# Patient Record
Sex: FEMALE | Race: WHITE | NOT HISPANIC OR LATINO | Employment: OTHER | URBAN - NONMETROPOLITAN AREA
[De-identification: names, ages, dates, MRNs, and addresses within clinical notes are randomized per-mention and may not be internally consistent; named-entity substitution may affect disease eponyms.]

---

## 2017-02-13 ENCOUNTER — OFFICE VISIT (OUTPATIENT)
Dept: CARDIOLOGY | Facility: CLINIC | Age: 77
End: 2017-02-13

## 2017-02-13 VITALS
HEART RATE: 101 BPM | HEIGHT: 64 IN | BODY MASS INDEX: 28 KG/M2 | SYSTOLIC BLOOD PRESSURE: 152 MMHG | WEIGHT: 164 LBS | DIASTOLIC BLOOD PRESSURE: 70 MMHG | OXYGEN SATURATION: 98 %

## 2017-02-13 DIAGNOSIS — R07.9 CHEST PAIN, UNSPECIFIED TYPE: Primary | ICD-10-CM

## 2017-02-13 DIAGNOSIS — I10 ESSENTIAL HYPERTENSION: ICD-10-CM

## 2017-02-13 DIAGNOSIS — K21.9 GASTROESOPHAGEAL REFLUX DISEASE, ESOPHAGITIS PRESENCE NOT SPECIFIED: ICD-10-CM

## 2017-02-13 PROCEDURE — 99203 OFFICE O/P NEW LOW 30 MIN: CPT | Performed by: INTERNAL MEDICINE

## 2017-02-13 RX ORDER — MULTIPLE VITAMINS W/ MINERALS TAB 9MG-400MCG
1 TAB ORAL DAILY
COMMUNITY

## 2017-02-13 RX ORDER — OMEPRAZOLE 20 MG/1
20 CAPSULE, DELAYED RELEASE ORAL DAILY
COMMUNITY
End: 2017-05-16

## 2017-02-13 RX ORDER — CALCIUM POLYCARBOPHIL 625 MG 625 MG/1
625 TABLET ORAL DAILY
COMMUNITY

## 2017-02-13 NOTE — PROGRESS NOTES
Chief complaint : Chest pain    History of Present Illness very pleasant 76-year-old female who comes for cardiac evaluation.  She was seen by her primary care physician and patient did complain of chest discomfort.  She described her symptoms as sharp and burning sensation more like indigestion.  There was no associated symptoms of shortness of breath diaphoresis or dizziness.  She believes is more like indigestion.  However she does have a family history of premature coronary artery disease.  Her brother had myocardial infarction and bypass surgery in his 40s.  Her mother did have heart disease as well.    Subjective      Review of Systems   Constitution: Negative. Negative for decreased appetite, diaphoresis, weakness, night sweats, weight gain and weight loss.   HENT: Negative for headaches, hearing loss, nosebleeds and sore throat.    Eyes: Negative.  Negative for blurred vision and photophobia.   Cardiovascular: Negative for chest pain, claudication, dyspnea on exertion, irregular heartbeat, leg swelling, palpitations, paroxysmal nocturnal dyspnea and syncope.   Respiratory: Negative for cough, hemoptysis, shortness of breath and wheezing.    Endocrine: Negative for cold intolerance, heat intolerance, polydipsia, polyphagia and polyuria.   Hematologic/Lymphatic: Negative.    Skin: Negative for color change, dry skin, flushing, itching and rash.   Musculoskeletal: Negative.  Negative for muscle cramps, muscle weakness and myalgias.   Gastrointestinal: Negative for abdominal pain, change in bowel habit, diarrhea, hematemesis, melena, nausea and vomiting.   Genitourinary: Negative for dysuria, frequency and hematuria.   Neurological: Negative for dizziness, focal weakness, light-headedness, loss of balance, numbness and seizures.   Psychiatric/Behavioral: Negative.  Negative for substance abuse, suicidal ideas and thoughts of violence.   Allergic/Immunologic: Negative.        Past Medical History   Diagnosis Date    • Allergic rhinitis    • Artificial lens present      Artificial lens in position - left      • Benign hypertension    • Corns and callus    • Dry cough      most likely due to ACE inhibitor     • Essential hypertension    • GERD (gastroesophageal reflux disease)    • Glaucoma, both eyes    • Hammer toe    • History of Papanicolaou smear of cervix 01/19/2004     Atrophy present.   • Malabsorption of glucose    • Nondisplaced fracture of right radial styloid process, initial encounter for closed fracture    • Nuclear senile cataract of right eye    • Nuclear senile cataract of right eye    • Obesity    • Pain in right hand    • Primary open angle glaucoma      controlled left right IOP increased but OCT stable         Family History   Problem Relation Age of Onset   • Cancer Other    • Diabetes Other    • Heart disease Other        Sulfa antibiotics     reports that she has never smoked. She does not have any smokeless tobacco history on file. She reports that she does not drink alcohol.    Objective     Vital Signs  Heart Rate:  [101] 101  BP: (152)/(70) 152/70    Physical Exam   Constitutional: She is oriented to person, place, and time. She appears well-developed and well-nourished.   HENT:   Head: Normocephalic and atraumatic.   Eyes: Conjunctivae and EOM are normal. Pupils are equal, round, and reactive to light.   Neck: Neck supple. No JVD present. Carotid bruit is not present. No tracheal deviation and no edema present.   Cardiovascular: Normal rate, regular rhythm, S1 normal, S2 normal, normal heart sounds and intact distal pulses.  Exam reveals no gallop, no S3, no S4 and no friction rub.    No murmur heard.  Pulmonary/Chest: Effort normal and breath sounds normal. She has no wheezes. She has no rales. She exhibits no tenderness.   Abdominal: Bowel sounds are normal. She exhibits no abdominal bruit and no pulsatile midline mass. There is no rebound and no guarding.   Musculoskeletal: Normal range of  motion. She exhibits no edema.   Neurological: She is alert and oriented to person, place, and time.   Skin: Skin is warm and dry.   Psychiatric: She has a normal mood and affect.       Procedures    Assessment/Plan     Patient Active Problem List   Diagnosis   • Chest pain   • Essential hypertension   • Gastroesophageal reflux disease     1. Chest pain, unspecified type  We will plan an exercise treadmill stress test for risk stratification.  Continue with management of hypertension and guideline direct medical therapy.    2. Essential hypertension  Her blood pressure appears to be uncontrolled at this time.  Recent modification to her medications were done by her primary care physician.  We will evaluate her pressure during the exercise treadmill stress testing.  I have asked her to check her blood pressure on a regular basis and right a log and bring to the office during her next visit.    3. Gastroesophageal reflux disease, esophagitis presence not specified    Her gastroesophageal reflux symptoms have been well controlled with current dose of Prilosec.  Plan is to continue with current medication.    I discussed the patients findings and my recommendations with patient.    Rodo Mills MD  02/13/17  1:09 PM    EMR Dragon/Transcription disclaimer:   Much of this encounter note is an electronic transcription/translation of spoken language to printed text. The electronic translation of spoken language may permit erroneous, or at times, nonsensical words or phrases to be inadvertently transcribed; Although I have reviewed the note for such errors, some may still exist.

## 2017-02-16 DIAGNOSIS — R42 DIZZINESS AND GIDDINESS: Primary | ICD-10-CM

## 2017-02-16 DIAGNOSIS — I73.9 CLAUDICATION OF LOWER EXTREMITY (HCC): ICD-10-CM

## 2017-05-16 ENCOUNTER — OFFICE VISIT (OUTPATIENT)
Dept: INTERNAL MEDICINE | Facility: CLINIC | Age: 77
End: 2017-05-16

## 2017-05-16 VITALS
DIASTOLIC BLOOD PRESSURE: 60 MMHG | SYSTOLIC BLOOD PRESSURE: 110 MMHG | BODY MASS INDEX: 27.98 KG/M2 | WEIGHT: 163.9 LBS | HEIGHT: 64 IN

## 2017-05-16 DIAGNOSIS — I10 ESSENTIAL HYPERTENSION: Primary | ICD-10-CM

## 2017-05-16 DIAGNOSIS — K21.9 GASTROESOPHAGEAL REFLUX DISEASE WITHOUT ESOPHAGITIS: ICD-10-CM

## 2017-05-16 PROCEDURE — 99213 OFFICE O/P EST LOW 20 MIN: CPT | Performed by: INTERNAL MEDICINE

## 2017-05-16 RX ORDER — LOSARTAN POTASSIUM AND HYDROCHLOROTHIAZIDE 12.5; 5 MG/1; MG/1
1 TABLET ORAL DAILY
Qty: 30 TABLET | Refills: 5 | Status: SHIPPED | OUTPATIENT
Start: 2017-05-16 | End: 2017-11-13 | Stop reason: SDUPTHER

## 2017-05-16 RX ORDER — OMEPRAZOLE 20 MG/1
20 CAPSULE, DELAYED RELEASE ORAL DAILY
Qty: 30 CAPSULE | Refills: 5 | Status: SHIPPED | OUTPATIENT
Start: 2017-05-16 | End: 2017-11-13 | Stop reason: SDUPTHER

## 2017-05-22 ENCOUNTER — OFFICE VISIT (OUTPATIENT)
Dept: CARDIOLOGY | Facility: CLINIC | Age: 77
End: 2017-05-22

## 2017-05-22 VITALS
OXYGEN SATURATION: 98 % | SYSTOLIC BLOOD PRESSURE: 144 MMHG | HEART RATE: 97 BPM | HEIGHT: 64 IN | DIASTOLIC BLOOD PRESSURE: 64 MMHG | BODY MASS INDEX: 28.17 KG/M2 | WEIGHT: 165 LBS

## 2017-05-22 DIAGNOSIS — I10 ESSENTIAL HYPERTENSION: Primary | ICD-10-CM

## 2017-05-22 PROCEDURE — 99212 OFFICE O/P EST SF 10 MIN: CPT | Performed by: INTERNAL MEDICINE

## 2017-10-17 ENCOUNTER — FLU SHOT (OUTPATIENT)
Dept: FAMILY MEDICINE CLINIC | Facility: CLINIC | Age: 77
End: 2017-10-17

## 2017-10-17 PROCEDURE — G0008 ADMIN INFLUENZA VIRUS VAC: HCPCS | Performed by: FAMILY MEDICINE

## 2017-10-17 PROCEDURE — 90662 IIV NO PRSV INCREASED AG IM: CPT | Performed by: FAMILY MEDICINE

## 2017-11-13 ENCOUNTER — OFFICE VISIT (OUTPATIENT)
Dept: INTERNAL MEDICINE | Facility: CLINIC | Age: 77
End: 2017-11-13

## 2017-11-13 VITALS
HEART RATE: 101 BPM | HEIGHT: 64 IN | BODY MASS INDEX: 28.61 KG/M2 | SYSTOLIC BLOOD PRESSURE: 118 MMHG | WEIGHT: 167.6 LBS | DIASTOLIC BLOOD PRESSURE: 80 MMHG | OXYGEN SATURATION: 98 %

## 2017-11-13 DIAGNOSIS — I10 ESSENTIAL HYPERTENSION: Primary | ICD-10-CM

## 2017-11-13 DIAGNOSIS — R73.03 PREDIABETES: ICD-10-CM

## 2017-11-13 DIAGNOSIS — K21.9 GASTROESOPHAGEAL REFLUX DISEASE, ESOPHAGITIS PRESENCE NOT SPECIFIED: ICD-10-CM

## 2017-11-13 PROCEDURE — 99213 OFFICE O/P EST LOW 20 MIN: CPT | Performed by: INTERNAL MEDICINE

## 2017-11-13 RX ORDER — OMEPRAZOLE 20 MG/1
20 CAPSULE, DELAYED RELEASE ORAL DAILY
Qty: 30 CAPSULE | Refills: 5 | Status: SHIPPED | OUTPATIENT
Start: 2017-11-13 | End: 2018-05-15 | Stop reason: SDUPTHER

## 2017-11-13 RX ORDER — LOSARTAN POTASSIUM AND HYDROCHLOROTHIAZIDE 12.5; 5 MG/1; MG/1
1 TABLET ORAL DAILY
Qty: 30 TABLET | Refills: 5 | Status: SHIPPED | OUTPATIENT
Start: 2017-11-13 | End: 2018-05-15 | Stop reason: SDUPTHER

## 2017-11-13 NOTE — PROGRESS NOTES
Subjective   Lyndsay Brumfield is a 77 y.o. female       Patient is in for recheck on HTN, prediabetes and GERD.    BP is controlled. She denies any chest pain or chest discomfort. She also denies dyspnea or orthopnea.  No headaches, dizziness or lightheadedness.  Takes medication as prescribed and tolerates it well.  Stress test in March 2017 was negative for ischemia.    Prediabetes.  Patient is watching carbs in her diet.  FBS has been between 100 and 120.  HgbA1c 6.1 /06/2016/.  Denies polydipsia or polyuria.Denies any other endocrine related complaints.      GERD is controlled on Prilosec. Denies abdominal pain, nausea or vomiting. Denies constipation or diarrhea.  Bowels are moving without blood.  She tried antiacids instead but had worsening of symptoms.    Past Medical History:   Diagnosis Date   • Allergic rhinitis    • Artificial lens present     Artificial lens in position - left      • Benign hypertension    • Corns and callus    • Dry cough     most likely due to ACE inhibitor     • Essential hypertension    • GERD (gastroesophageal reflux disease)    • Glaucoma, both eyes    • Hammer toe    • History of Papanicolaou smear of cervix 01/19/2004    Atrophy present.   • Malabsorption of glucose    • Nondisplaced fracture of right radial styloid process, initial encounter for closed fracture    • Nuclear senile cataract of right eye    • Nuclear senile cataract of right eye    • Obesity    • Pain in right hand    • Primary open angle glaucoma     controlled left right IOP increased but OCT stable       Past Surgical History:   Procedure Laterality Date   • BREAST SURGERY  11/22/1974    Cystic disease, right breast. Excision of lesion of right breast.   • ENDOSCOPY AND COLONOSCOPY  02/11/2008    A few diverticula in the sigmoid.   • ESOPHAGOSCOPY / EGD  03/10/2008    Grade II esophagitis of the distal esophagus. Multiple biopsies were obtained from the distal esophagus. The stomach appeared normal. The  duodenum appeared normal   • EXTERNAL EAR SURGERY  04/25/2001    Full thickness wedge excision of the left ear, with layered closure   • EYE SURGERY  02/19/2013    Remove cataract, insert lens (1)    left eye    • MASTECTOMY  01/10/1991    Total mastectomy with level I and II node dissection 2   • OTHER SURGICAL HISTORY  03/20/2015    OCT DISC NFL 30963 (2) (Primary open angle glaucoma)        Social History   Substance Use Topics   • Smoking status: Never Smoker   • Smokeless tobacco: Not on file   • Alcohol use No     Family History   Problem Relation Age of Onset   • Cancer Other    • Diabetes Other    • Heart disease Other      Allergies   Allergen Reactions   • Sulfa Antibiotics      Current Outpatient Prescriptions on File Prior to Visit   Medication Sig Dispense Refill   • Calcium-Magnesium-Vitamin D (CALCIUM 500 PO) Take  by mouth.     • latanoprost (XALATAN) 0.005 % ophthalmic solution 1 drop Every Night.     • Multiple Vitamins-Minerals (MULTIVITAMIN WITH MINERALS) tablet tablet Take 1 tablet by mouth Daily.     • phenazopyridine (PYRIDIUM) 200 MG tablet Take 1 tablet by mouth 3 (Three) Times a Day As Needed for bladder spasms. 6 tablet 0   • polycarbophil (FIBERCON) 625 MG tablet Take 625 mg by mouth Daily.     • [DISCONTINUED] losartan-hydrochlorothiazide (HYZAAR) 50-12.5 MG per tablet Take 1 tablet by mouth Daily. 30 tablet 5   • [DISCONTINUED] omeprazole (PRILOSEC) 20 MG capsule Take 1 capsule by mouth Daily. 30 capsule 5     No current facility-administered medications on file prior to visit.      Review of Systems   Constitutional: Negative for activity change and fatigue.   Respiratory: Negative for chest tightness and shortness of breath.    Cardiovascular: Negative for chest pain, palpitations and leg swelling.   Endocrine: Negative for cold intolerance, heat intolerance, polydipsia and polyuria.   Genitourinary: Negative for flank pain and frequency.   Neurological: Negative for dizziness,  light-headedness and headaches.       Objective   Physical Exam   Constitutional: She is oriented to person, place, and time. She appears well-developed and well-nourished.   HENT:   Head: Normocephalic and atraumatic.   Nose: Nose normal.   Mouth/Throat: Oropharynx is clear and moist.   Eyes: Conjunctivae and EOM are normal. No scleral icterus.   Neck: Neck supple. No thyromegaly present.   Cardiovascular: Normal rate and regular rhythm.    No murmur heard.  Pulmonary/Chest: Effort normal and breath sounds normal.   Abdominal: Soft. Bowel sounds are normal. She exhibits no mass.   Neurological: She is alert and oriented to person, place, and time. She has normal reflexes.   Skin: Skin is warm and dry. No rash noted.   Psychiatric: She has a normal mood and affect. Her behavior is normal.           Patient Active Problem List   Diagnosis   • Chest pain   • Essential hypertension   • Gastroesophageal reflux disease         Admission on 03/19/2017, Discharged on 03/19/2017   • Iron 11/29/2016 123  37 - 170 ug/dl Final   • TIBC 11/29/2016 296  265 - 497 ug/dl Final   • Iron Saturation 11/29/2016 41.6  15.0 - 50.0 % Final   • Folate 11/29/2016 >20.00  ng/ml Final   • Vitamin B-12 11/29/2016 686  239 - 931 pg/ml Final   Office Visit on 11/17/2016   Component Date Value Ref Range Status   • Sodium 11/22/2016 136* 137 - 145 mmol/L Final   • Potassium 11/22/2016 4.2  3.5 - 5.1 mmol/L Final   • Chloride 11/22/2016 97  95 - 110 mmol/L Final   • CO2 11/22/2016 30  22 - 31 mmol/L Final   • Anion Gap 11/22/2016 9.0  5.0 - 15.0 mmol/L Final   • Glucose 11/22/2016 105* 60 - 100 mg/dl Final   • BUN 11/22/2016 20  7 - 21 mg/dl Final   • Creatinine 11/22/2016 0.9  0.5 - 1.0 mg/dl Final   • GFR MDRD Non  11/22/2016 61  39 - 90 mL/min/1.73 sq.M Final   • GFR MDRD  11/22/2016 73  39 - 90 mL/min/1.73 sq.M Final   • Calcium 11/22/2016 9.5  8.4 - 10.2 mg/dl Final   • Total Protein 11/22/2016 7.5  6.3 - 8.6  gm/dl Final   • Albumin 11/22/2016 3.9  3.4 - 4.8 gm/dl Final   • Total Bilirubin 11/22/2016 0.5  0.2 - 1.3 mg/dl Final   • Alkaline Phosphatase 11/22/2016 46  38 - 126 U/L Final   • AST (SGOT) 11/22/2016 33  14 - 36 U/L Final   • ALT (SGPT) 11/22/2016 29  9 - 52 U/L Final   • WBC 11/22/2016 5.4  3.2 - 9.8 x1000/uL Final   • RBC 11/22/2016 3.76* 3.77 - 5.16 miguel/mm3 Final   • Hemoglobin 11/22/2016 11.9* 12.0 - 15.5 gm/dl Final   • Hematocrit 11/22/2016 35.1  35.0 - 45.0 % Final   • MCV 11/22/2016 93.4  80.0 - 98.0 fl Final   • MCH 11/22/2016 31.6  26.0 - 34.0 pg Final   • MCHC 11/22/2016 33.9  31.4 - 36.0 gm/dl Final   • RDW 11/22/2016 12.4  11.5 - 14.5 % Final   • Platelets 11/22/2016 282  150 - 450 x1000/mm3 Final   • MPV 11/22/2016 10.1  8.0 - 12.0 fl Final   • Neutrophil Rel % 11/22/2016 47.8  37.0 - 80.0 % Final   • Lymphocyte Rel % 11/22/2016 36.5  10.0 - 50.0 % Final   • Monocyte Rel % 11/22/2016 11.1  0.0 - 12.0 % Final   • Eosinophil Rel % 11/22/2016 3.1  0.0 - 7.0 % Final   • Basophil Rel % 11/22/2016 1.3  0.0 - 2.0 % Final   • Immature Granulocyte Rel % 11/22/2016 0.20  0.00 - 0.50 % Final   • Neutrophils Absolute 11/22/2016 2.58  2.00 - 8.60 x1000/uL Final   • Lymphocytes Absolute 11/22/2016 1.97  0.60 - 4.20 x1000/uL Final   • Monocytes Absolute 11/22/2016 0.60  0.00 - 0.90 x1000/uL Final   • Eosinophils Absolute 11/22/2016 0.17  0.00 - 0.70 x1000/uL Final   • Basophils Absolute 11/22/2016 0.07  0.00 - 0.20 x1000/uL Final   • Immature Granulocytes Absolute 11/22/2016 0.010  0.005 - 0.022 x1000/uL Final   Abstract on 11/15/2016   Component Date Value Ref Range Status   • HM Colonoscopy 02/11/2008 COMPLETE   Final   • HM Dexa Scan 06/17/2016 COMPLETE (2)   Final         Assessment       Diagnosis Plan   1. Essential hypertension  Comprehensive Metabolic Panel    Lipid Panel   2. Prediabetes  Comprehensive Metabolic Panel    Hemoglobin A1c   3. Gastroesophageal reflux disease, esophagitis presence not  specified           Plan      1. Patient will continue Hyzaar.      DASH.      Counseled on physical activity and exercise.  2. Will check HgbA1c.       ADA diet.       Discussed calories restriction and weight loss.  3.  Prilosec is continued.       Medication benefits and side effects reviewed.       Advised to avoid fried, fatty foods and gastric irritants.      Follow up in 6 months.            This document has been electronically signed by Graciela Osborn MD on November 13, 2017 3:04 PM

## 2017-11-14 ENCOUNTER — LAB (OUTPATIENT)
Dept: LAB | Facility: HOSPITAL | Age: 77
End: 2017-11-14

## 2017-11-14 ENCOUNTER — TELEPHONE (OUTPATIENT)
Dept: INTERNAL MEDICINE | Facility: CLINIC | Age: 77
End: 2017-11-14

## 2017-11-14 DIAGNOSIS — D53.9 ANEMIA, DEFICIENCY: ICD-10-CM

## 2017-11-14 LAB
ALBUMIN SERPL-MCNC: 4.4 G/DL (ref 3.4–4.8)
ALBUMIN/GLOB SERPL: 1.2 G/DL (ref 1.1–1.8)
ALP SERPL-CCNC: 54 U/L (ref 38–126)
ALT SERPL W P-5'-P-CCNC: 26 U/L (ref 9–52)
ANION GAP SERPL CALCULATED.3IONS-SCNC: 10 MMOL/L (ref 5–15)
ARTICHOKE IGE QN: 103 MG/DL (ref 1–129)
AST SERPL-CCNC: 31 U/L (ref 14–36)
BILIRUB SERPL-MCNC: 0.6 MG/DL (ref 0.2–1.3)
BUN BLD-MCNC: 24 MG/DL (ref 7–21)
BUN/CREAT SERPL: 23.1 (ref 7–25)
CALCIUM SPEC-SCNC: 10 MG/DL (ref 8.4–10.2)
CHLORIDE SERPL-SCNC: 99 MMOL/L (ref 95–110)
CHOLEST SERPL-MCNC: 210 MG/DL (ref 0–199)
CO2 SERPL-SCNC: 29 MMOL/L (ref 22–31)
CREAT BLD-MCNC: 1.04 MG/DL (ref 0.5–1)
FOLATE SERPL-MCNC: >20 NG/ML (ref 2.76–21)
GFR SERPL CREATININE-BSD FRML MDRD: 51 ML/MIN/1.73 (ref 60–90)
GLOBULIN UR ELPH-MCNC: 3.7 GM/DL (ref 2.3–3.5)
GLUCOSE BLD-MCNC: 122 MG/DL (ref 60–100)
HBA1C MFR BLD: 6.3 % (ref 4–5.6)
HDLC SERPL-MCNC: 60 MG/DL (ref 60–200)
LDLC/HDLC SERPL: 2.07 {RATIO} (ref 0–3.22)
POTASSIUM BLD-SCNC: 4.3 MMOL/L (ref 3.5–5.1)
PROT SERPL-MCNC: 8.1 G/DL (ref 6.3–8.6)
SODIUM BLD-SCNC: 138 MMOL/L (ref 137–145)
TRIGL SERPL-MCNC: 129 MG/DL (ref 20–199)
VIT B12 BLD-MCNC: 744 PG/ML (ref 239–931)

## 2017-11-14 PROCEDURE — 82746 ASSAY OF FOLIC ACID SERUM: CPT | Performed by: INTERNAL MEDICINE

## 2017-11-14 PROCEDURE — 80061 LIPID PANEL: CPT | Performed by: INTERNAL MEDICINE

## 2017-11-14 PROCEDURE — 36415 COLL VENOUS BLD VENIPUNCTURE: CPT | Performed by: INTERNAL MEDICINE

## 2017-11-14 PROCEDURE — 83036 HEMOGLOBIN GLYCOSYLATED A1C: CPT | Performed by: INTERNAL MEDICINE

## 2017-11-14 PROCEDURE — 82607 VITAMIN B-12: CPT | Performed by: INTERNAL MEDICINE

## 2017-11-14 PROCEDURE — 80053 COMPREHEN METABOLIC PANEL: CPT | Performed by: INTERNAL MEDICINE

## 2017-11-14 NOTE — PATIENT INSTRUCTIONS

## 2017-11-14 NOTE — TELEPHONE ENCOUNTER
Spoke with pt let her know hba1c was in diabetic range DR DAVIDSON said no meds at this time but she does recommend appt with diabetic educator  To help with diet and they will contact her with an appt....

## 2017-11-15 DIAGNOSIS — R73.03 PREDIABETES: Primary | ICD-10-CM

## 2018-01-25 ENCOUNTER — OFFICE VISIT (OUTPATIENT)
Dept: INTERNAL MEDICINE | Facility: CLINIC | Age: 78
End: 2018-01-25

## 2018-01-25 ENCOUNTER — APPOINTMENT (OUTPATIENT)
Dept: LAB | Facility: HOSPITAL | Age: 78
End: 2018-01-25

## 2018-01-25 VITALS
HEART RATE: 107 BPM | WEIGHT: 159.19 LBS | DIASTOLIC BLOOD PRESSURE: 74 MMHG | HEIGHT: 64 IN | OXYGEN SATURATION: 99 % | BODY MASS INDEX: 27.18 KG/M2 | SYSTOLIC BLOOD PRESSURE: 136 MMHG

## 2018-01-25 DIAGNOSIS — R10.10 UPPER ABDOMINAL PAIN: Primary | ICD-10-CM

## 2018-01-25 DIAGNOSIS — R11.2 NON-INTRACTABLE VOMITING WITH NAUSEA, UNSPECIFIED VOMITING TYPE: ICD-10-CM

## 2018-01-25 LAB
ALBUMIN SERPL-MCNC: 4.1 G/DL (ref 3.4–4.8)
ALBUMIN/GLOB SERPL: 1.2 G/DL (ref 1.1–1.8)
ALP SERPL-CCNC: 53 U/L (ref 38–126)
ALT SERPL W P-5'-P-CCNC: 29 U/L (ref 9–52)
AMYLASE SERPL-CCNC: 97 U/L (ref 50–130)
ANION GAP SERPL CALCULATED.3IONS-SCNC: 12 MMOL/L (ref 5–15)
AST SERPL-CCNC: 41 U/L (ref 14–36)
BASOPHILS # BLD AUTO: 0.04 10*3/MM3 (ref 0–0.2)
BASOPHILS NFR BLD AUTO: 0.6 % (ref 0–2)
BILIRUB SERPL-MCNC: 0.4 MG/DL (ref 0.2–1.3)
BUN BLD-MCNC: 22 MG/DL (ref 7–21)
BUN/CREAT SERPL: 19.8 (ref 7–25)
CALCIUM SPEC-SCNC: 9.1 MG/DL (ref 8.4–10.2)
CHLORIDE SERPL-SCNC: 98 MMOL/L (ref 95–110)
CO2 SERPL-SCNC: 24 MMOL/L (ref 22–31)
CREAT BLD-MCNC: 1.11 MG/DL (ref 0.5–1)
DEPRECATED RDW RBC AUTO: 41 FL (ref 36.4–46.3)
EOSINOPHIL # BLD AUTO: 0.08 10*3/MM3 (ref 0–0.7)
EOSINOPHIL NFR BLD AUTO: 1.2 % (ref 0–7)
ERYTHROCYTE [DISTWIDTH] IN BLOOD BY AUTOMATED COUNT: 12.3 % (ref 11.5–14.5)
GFR SERPL CREATININE-BSD FRML MDRD: 47 ML/MIN/1.73 (ref 39–90)
GLOBULIN UR ELPH-MCNC: 3.4 GM/DL (ref 2.3–3.5)
GLUCOSE BLD-MCNC: 102 MG/DL (ref 60–100)
HCT VFR BLD AUTO: 36.7 % (ref 35–45)
HGB BLD-MCNC: 12.8 G/DL (ref 12–15.5)
IMM GRANULOCYTES # BLD: 0.01 10*3/MM3 (ref 0–0.02)
IMM GRANULOCYTES NFR BLD: 0.1 % (ref 0–0.5)
LIPASE SERPL-CCNC: 118 U/L (ref 23–300)
LYMPHOCYTES # BLD AUTO: 2.1 10*3/MM3 (ref 0.6–4.2)
LYMPHOCYTES NFR BLD AUTO: 30.8 % (ref 10–50)
MCH RBC QN AUTO: 31.8 PG (ref 26.5–34)
MCHC RBC AUTO-ENTMCNC: 34.9 G/DL (ref 31.4–36)
MCV RBC AUTO: 91.1 FL (ref 80–98)
MONOCYTES # BLD AUTO: 0.8 10*3/MM3 (ref 0–0.9)
MONOCYTES NFR BLD AUTO: 11.7 % (ref 0–12)
NEUTROPHILS # BLD AUTO: 3.79 10*3/MM3 (ref 2–8.6)
NEUTROPHILS NFR BLD AUTO: 55.6 % (ref 37–80)
PLATELET # BLD AUTO: 250 10*3/MM3 (ref 150–450)
PMV BLD AUTO: 10.3 FL (ref 8–12)
POTASSIUM BLD-SCNC: 3.6 MMOL/L (ref 3.5–5.1)
PROT SERPL-MCNC: 7.5 G/DL (ref 6.3–8.6)
RBC # BLD AUTO: 4.03 10*6/MM3 (ref 3.77–5.16)
SODIUM BLD-SCNC: 134 MMOL/L (ref 137–145)
WBC NRBC COR # BLD: 6.82 10*3/MM3 (ref 3.2–9.8)

## 2018-01-25 PROCEDURE — 36415 COLL VENOUS BLD VENIPUNCTURE: CPT | Performed by: INTERNAL MEDICINE

## 2018-01-25 PROCEDURE — 82150 ASSAY OF AMYLASE: CPT | Performed by: INTERNAL MEDICINE

## 2018-01-25 PROCEDURE — 80053 COMPREHEN METABOLIC PANEL: CPT | Performed by: INTERNAL MEDICINE

## 2018-01-25 PROCEDURE — 85025 COMPLETE CBC W/AUTO DIFF WBC: CPT | Performed by: INTERNAL MEDICINE

## 2018-01-25 PROCEDURE — 83690 ASSAY OF LIPASE: CPT | Performed by: INTERNAL MEDICINE

## 2018-01-25 PROCEDURE — 99213 OFFICE O/P EST LOW 20 MIN: CPT | Performed by: INTERNAL MEDICINE

## 2018-01-25 RX ORDER — ONDANSETRON 4 MG/1
4 TABLET, FILM COATED ORAL EVERY 8 HOURS PRN
Qty: 20 TABLET | Refills: 0 | Status: SHIPPED | OUTPATIENT
Start: 2018-01-25 | End: 2018-02-22

## 2018-01-25 RX ORDER — SUCRALFATE 1 G/1
1 TABLET ORAL 4 TIMES DAILY
Qty: 90 TABLET | Refills: 1 | Status: SHIPPED | OUTPATIENT
Start: 2018-01-25 | End: 2018-08-05

## 2018-01-25 NOTE — PROGRESS NOTES
Subjective   Lyndsay Brumfield is a 77 y.o. female       Patient is in complaining of  abdominal pain, nausea and vomiting since this weekend. Pain is located in epigastric area and does not radiate. It is worse after eating ad has been associated with nausea. Patient vomited few times on Saturday but none since then.  Takes Prilosec for GERD but it has not been helping with her symptoms.      Abdominal Pain   This is a new problem. The current episode started in the past 7 days. The problem occurs intermittently. The pain is located in the epigastric region. The pain is at a severity of 3/10. The abdominal pain does not radiate. Associated symptoms include nausea and vomiting. Pertinent negatives include no belching, dysuria, fever, frequency, headaches, hematochezia or melena. The pain is aggravated by eating. Her past medical history is significant for GERD. There is no history of gallstones, irritable bowel syndrome, pancreatitis or PUD.       Past Medical History:   Diagnosis Date   • Allergic rhinitis    • Artificial lens present     Artificial lens in position - left      • Benign hypertension    • Corns and callus    • Dry cough     most likely due to ACE inhibitor     • Essential hypertension    • GERD (gastroesophageal reflux disease)    • Glaucoma, both eyes    • Hammer toe    • History of Papanicolaou smear of cervix 01/19/2004    Atrophy present.   • Malabsorption of glucose    • Nondisplaced fracture of right radial styloid process, initial encounter for closed fracture    • Nuclear senile cataract of right eye    • Nuclear senile cataract of right eye    • Obesity    • Pain in right hand    • Primary open angle glaucoma     controlled left right IOP increased but OCT stable       Past Surgical History:   Procedure Laterality Date   • BREAST SURGERY  11/22/1974    Cystic disease, right breast. Excision of lesion of right breast.   • ENDOSCOPY AND COLONOSCOPY  02/11/2008    A few diverticula in the  sigmoid.   • ESOPHAGOSCOPY / EGD  03/10/2008    Grade II esophagitis of the distal esophagus. Multiple biopsies were obtained from the distal esophagus. The stomach appeared normal. The duodenum appeared normal   • EXTERNAL EAR SURGERY  04/25/2001    Full thickness wedge excision of the left ear, with layered closure   • EYE SURGERY  02/19/2013    Remove cataract, insert lens (1)    left eye    • MASTECTOMY  01/10/1991    Total mastectomy with level I and II node dissection 2   • OTHER SURGICAL HISTORY  03/20/2015    OCT DISC NFL 98145 (2) (Primary open angle glaucoma)        Social History   Substance Use Topics   • Smoking status: Never Smoker   • Smokeless tobacco: Never Used   • Alcohol use No     Family History   Problem Relation Age of Onset   • Cancer Other    • Diabetes Other    • Heart disease Other      Allergies   Allergen Reactions   • Sulfa Antibiotics      Current Outpatient Prescriptions on File Prior to Visit   Medication Sig Dispense Refill   • Calcium-Magnesium-Vitamin D (CALCIUM 500 PO) Take  by mouth.     • latanoprost (XALATAN) 0.005 % ophthalmic solution 1 drop Every Night.     • losartan-hydrochlorothiazide (HYZAAR) 50-12.5 MG per tablet Take 1 tablet by mouth Daily. 30 tablet 5   • Multiple Vitamins-Minerals (MULTIVITAMIN WITH MINERALS) tablet tablet Take 1 tablet by mouth Daily.     • omeprazole (PRILOSEC) 20 MG capsule Take 1 capsule by mouth Daily. 30 capsule 5   • polycarbophil (FIBERCON) 625 MG tablet Take 625 mg by mouth Daily.       No current facility-administered medications on file prior to visit.      Review of Systems   Constitutional: Negative for activity change, chills and fever.   Eyes: Negative for photophobia and visual disturbance.   Respiratory: Negative for cough, chest tightness, shortness of breath and wheezing.    Cardiovascular: Negative for chest pain, palpitations and leg swelling.   Gastrointestinal: Positive for abdominal pain, nausea and vomiting. Negative for  hematochezia and melena.   Genitourinary: Negative for dysuria, flank pain and frequency.   Musculoskeletal: Negative for back pain and neck pain.   Skin: Negative for color change, rash and wound.   Neurological: Negative for dizziness, light-headedness and headaches.       Objective   Physical Exam   Constitutional: She is oriented to person, place, and time. She appears well-developed and well-nourished.   HENT:   Head: Normocephalic and atraumatic.   Nose: Nose normal.   Mouth/Throat: Oropharynx is clear and moist.   Eyes: Conjunctivae are normal.   Neck: Neck supple. No JVD present. No thyromegaly present.   Cardiovascular: Normal rate and regular rhythm.    Pulmonary/Chest: Effort normal and breath sounds normal.   Abdominal: Soft. Bowel sounds are normal. She exhibits no distension and no mass. There is tenderness. No hernia.   Mild tenderness in epigastric area   Neurological: She is alert and oriented to person, place, and time. She has normal reflexes.   Skin: Skin is warm and dry. No rash noted.   Psychiatric: She has a normal mood and affect. Her behavior is normal.   Nursing note and vitals reviewed.          Patient Active Problem List   Diagnosis   • Chest pain   • Essential hypertension   • Gastroesophageal reflux disease   • Primary open angle glaucoma   • Pain in right hand   • Obesity   • Nuclear senile cataract of right eye   • Nuclear senile cataract of right eye   • Nondisplaced fracture of right radial styloid process, initial encounter for closed fracture   • Malabsorption of glucose   • History of Papanicolaou smear of cervix   • Hammer toe   • Glaucoma, both eyes   • GERD (gastroesophageal reflux disease)   • Dry cough   • Corns and callus   • Benign hypertension   • Artificial lens present   • Allergic rhinitis               Assessment/Plan   Lyndsay was seen today for nausea, vomiting and abdominal pain.    Diagnoses and all orders for this visit:    Upper abdominal pain  -     CBC &  Differential  -     Comprehensive Metabolic Panel  -     Amylase  -     Lipase  -     Urinalysis With / Microscopic If Indicated - Urine, Clean Catch    Non-intractable vomiting with nausea, unspecified vomiting type    Other orders  -     sucralfate (CARAFATE) 1 g tablet; Take 1 tablet by mouth 4 (Four) Times a Day.  -     ondansetron (ZOFRAN) 4 MG tablet; Take 1 tablet by mouth Every 8 (Eight) Hours As Needed for Nausea or Vomiting.         Plan        Suspect symptoms are related to gastritis.  Trial of Carafate 1 gr PO tid.  Zofran 4 mg q 8hrs PRN nausea and vomiting.  [place] diet.        Follow up in 4 weeks.          This document has been electronically signed by Graciela Osborn MD on January 25, 2018 10:25 AM

## 2018-02-22 ENCOUNTER — OFFICE VISIT (OUTPATIENT)
Dept: INTERNAL MEDICINE | Facility: CLINIC | Age: 78
End: 2018-02-22

## 2018-02-22 VITALS
HEIGHT: 64 IN | BODY MASS INDEX: 27.08 KG/M2 | SYSTOLIC BLOOD PRESSURE: 140 MMHG | WEIGHT: 158.6 LBS | DIASTOLIC BLOOD PRESSURE: 60 MMHG

## 2018-02-22 DIAGNOSIS — K29.00 ACUTE GASTRITIS WITHOUT HEMORRHAGE, UNSPECIFIED GASTRITIS TYPE: Primary | ICD-10-CM

## 2018-02-22 DIAGNOSIS — K21.9 GASTROESOPHAGEAL REFLUX DISEASE WITHOUT ESOPHAGITIS: ICD-10-CM

## 2018-02-22 PROCEDURE — 99213 OFFICE O/P EST LOW 20 MIN: CPT | Performed by: INTERNAL MEDICINE

## 2018-02-22 NOTE — PROGRESS NOTES
Subjective   Lyndsay Brumfield is a 77 y.o. female       Patient is in for recheck on abdominal pain.  She is doing better on Carafate with improvement in her symptoms.  Abdominal pain improved and she has not had any vomiting.  She gets nauseated at times if she eats certain foods.  She is still taking Prilosec and it has been helping with acid reflux.      Past Medical History:   Diagnosis Date   • Allergic rhinitis    • Artificial lens present     Artificial lens in position - left      • Benign hypertension    • Corns and callus    • Dry cough     most likely due to ACE inhibitor     • Essential hypertension    • GERD (gastroesophageal reflux disease)    • Glaucoma, both eyes    • Hammer toe    • History of Papanicolaou smear of cervix 01/19/2004    Atrophy present.   • Malabsorption of glucose    • Nondisplaced fracture of right radial styloid process, initial encounter for closed fracture    • Nuclear senile cataract of right eye    • Nuclear senile cataract of right eye    • Obesity    • Pain in right hand    • Primary open angle glaucoma     controlled left right IOP increased but OCT stable       Past Surgical History:   Procedure Laterality Date   • BREAST SURGERY  11/22/1974    Cystic disease, right breast. Excision of lesion of right breast.   • ENDOSCOPY AND COLONOSCOPY  02/11/2008    A few diverticula in the sigmoid.   • ESOPHAGOSCOPY / EGD  03/10/2008    Grade II esophagitis of the distal esophagus. Multiple biopsies were obtained from the distal esophagus. The stomach appeared normal. The duodenum appeared normal   • EXTERNAL EAR SURGERY  04/25/2001    Full thickness wedge excision of the left ear, with layered closure   • EYE SURGERY  02/19/2013    Remove cataract, insert lens (1)    left eye    • MASTECTOMY  01/10/1991    Total mastectomy with level I and II node dissection 2   • OTHER SURGICAL HISTORY  03/20/2015    OCT DISC NFL 10295 (2) (Primary open angle glaucoma)        Social History    Substance Use Topics   • Smoking status: Never Smoker   • Smokeless tobacco: Never Used   • Alcohol use No     Family History   Problem Relation Age of Onset   • Cancer Other    • Diabetes Other    • Heart disease Other      Allergies   Allergen Reactions   • Sulfa Antibiotics      Current Outpatient Prescriptions on File Prior to Visit   Medication Sig Dispense Refill   • Calcium-Magnesium-Vitamin D (CALCIUM 500 PO) Take  by mouth.     • latanoprost (XALATAN) 0.005 % ophthalmic solution 1 drop Every Night.     • losartan-hydrochlorothiazide (HYZAAR) 50-12.5 MG per tablet Take 1 tablet by mouth Daily. 30 tablet 5   • Multiple Vitamins-Minerals (MULTIVITAMIN WITH MINERALS) tablet tablet Take 1 tablet by mouth Daily.     • omeprazole (PRILOSEC) 20 MG capsule Take 1 capsule by mouth Daily. 30 capsule 5   • polycarbophil (FIBERCON) 625 MG tablet Take 625 mg by mouth Daily.     • sucralfate (CARAFATE) 1 g tablet Take 1 tablet by mouth 4 (Four) Times a Day. 90 tablet 1   • [DISCONTINUED] ondansetron (ZOFRAN) 4 MG tablet Take 1 tablet by mouth Every 8 (Eight) Hours As Needed for Nausea or Vomiting. 20 tablet 0     No current facility-administered medications on file prior to visit.      Review of Systems   Constitutional: Negative for activity change, chills and fever.   HENT: Negative.    Eyes: Negative.    Respiratory: Negative for chest tightness and shortness of breath.    Cardiovascular: Negative for chest pain, palpitations and leg swelling.   Gastrointestinal: Positive for nausea. Negative for abdominal distention, constipation, diarrhea and vomiting.   Endocrine: Negative for cold intolerance, heat intolerance, polydipsia and polyuria.   Genitourinary: Negative for flank pain and frequency.   Neurological: Negative for dizziness, light-headedness and headaches.       Objective   Physical Exam   Constitutional: She appears well-developed and well-nourished.   HENT:   Nose: Nose normal.   Mouth/Throat: Oropharynx  is clear and moist.   Eyes: Conjunctivae are normal. No scleral icterus.   Neck: Neck supple. No JVD present. No thyromegaly present.   Cardiovascular: Normal rate and regular rhythm.    No murmur heard.  Pulmonary/Chest: Effort normal and breath sounds normal.   Abdominal: Soft. Bowel sounds are normal. She exhibits no distension and no mass. There is no tenderness.   Neurological: She is alert. She has normal reflexes.   Skin: Skin is warm and dry. No rash noted.   Psychiatric: She has a normal mood and affect. Her behavior is normal.   Nursing note and vitals reviewed.          Patient Active Problem List   Diagnosis   • Chest pain   • Essential hypertension   • Gastroesophageal reflux disease   • Primary open angle glaucoma   • Pain in right hand   • Obesity   • Nuclear senile cataract of right eye   • Nuclear senile cataract of right eye   • Nondisplaced fracture of right radial styloid process, initial encounter for closed fracture   • Malabsorption of glucose   • History of Papanicolaou smear of cervix   • Hammer toe   • Glaucoma, both eyes   • GERD (gastroesophageal reflux disease)   • Dry cough   • Benign hypertension   • Artificial lens present   • Allergic rhinitis         Office Visit on 01/25/2018   Component Date Value Ref Range Status   • Glucose 01/25/2018 102* 60 - 100 mg/dL Final   • BUN 01/25/2018 22* 7 - 21 mg/dL Final   • Creatinine 01/25/2018 1.11* 0.50 - 1.00 mg/dL Final   • Sodium 01/25/2018 134* 137 - 145 mmol/L Final   • Potassium 01/25/2018 3.6  3.5 - 5.1 mmol/L Final   • Chloride 01/25/2018 98  95 - 110 mmol/L Final   • CO2 01/25/2018 24.0  22.0 - 31.0 mmol/L Final   • Calcium 01/25/2018 9.1  8.4 - 10.2 mg/dL Final   • Total Protein 01/25/2018 7.5  6.3 - 8.6 g/dL Final   • Albumin 01/25/2018 4.10  3.40 - 4.80 g/dL Final   • ALT (SGPT) 01/25/2018 29  9 - 52 U/L Final   • AST (SGOT) 01/25/2018 41* 14 - 36 U/L Final   • Alkaline Phosphatase 01/25/2018 53  38 - 126 U/L Final   • Total  Bilirubin 01/25/2018 0.4  0.2 - 1.3 mg/dL Final   • eGFR Non  Amer 01/25/2018 47  39 - 90 mL/min/1.73 Final   • Globulin 01/25/2018 3.4  2.3 - 3.5 gm/dL Final   • A/G Ratio 01/25/2018 1.2  1.1 - 1.8 g/dL Final   • BUN/Creatinine Ratio 01/25/2018 19.8  7.0 - 25.0 Final   • Anion Gap 01/25/2018 12.0  5.0 - 15.0 mmol/L Final   • Amylase 01/25/2018 97  50 - 130 U/L Final   • Lipase 01/25/2018 118  23 - 300 U/L Final   • WBC 01/25/2018 6.82  3.20 - 9.80 10*3/mm3 Final   • RBC 01/25/2018 4.03  3.77 - 5.16 10*6/mm3 Final   • Hemoglobin 01/25/2018 12.8  12.0 - 15.5 g/dL Final   • Hematocrit 01/25/2018 36.7  35.0 - 45.0 % Final   • MCV 01/25/2018 91.1  80.0 - 98.0 fL Final   • MCH 01/25/2018 31.8  26.5 - 34.0 pg Final   • MCHC 01/25/2018 34.9  31.4 - 36.0 g/dL Final   • RDW 01/25/2018 12.3  11.5 - 14.5 % Final   • RDW-SD 01/25/2018 41.0  36.4 - 46.3 fl Final   • MPV 01/25/2018 10.3  8.0 - 12.0 fL Final   • Platelets 01/25/2018 250  150 - 450 10*3/mm3 Final   • Neutrophil % 01/25/2018 55.6  37.0 - 80.0 % Final   • Lymphocyte % 01/25/2018 30.8  10.0 - 50.0 % Final   • Monocyte % 01/25/2018 11.7  0.0 - 12.0 % Final   • Eosinophil % 01/25/2018 1.2  0.0 - 7.0 % Final   • Basophil % 01/25/2018 0.6  0.0 - 2.0 % Final   • Immature Grans % 01/25/2018 0.1  0.0 - 0.5 % Final   • Neutrophils, Absolute 01/25/2018 3.79  2.00 - 8.60 10*3/mm3 Final   • Lymphocytes, Absolute 01/25/2018 2.10  0.60 - 4.20 10*3/mm3 Final   • Monocytes, Absolute 01/25/2018 0.80  0.00 - 0.90 10*3/mm3 Final   • Eosinophils, Absolute 01/25/2018 0.08  0.00 - 0.70 10*3/mm3 Final   • Basophils, Absolute 01/25/2018 0.04  0.00 - 0.20 10*3/mm3 Final   • Immature Grans, Absolute 01/25/2018 0.01  0.00 - 0.02 10*3/mm3 Final           Assessment/Plan   Lyndsay was seen today for follow-up and abdominal pain.    Diagnoses and all orders for this visit:    Acute gastritis without hemorrhage, unspecified gastritis type    Gastroesophageal reflux disease without  esophagitis             Plan       Patient will continue Prilosec 20 mg daily.  Carafate 1 gr tid PRN.  She is advised to avoid spicy, fried, fatty foods and gastric irritants.        Follow up for routine care.        This document has been electronically signed by Graciela Osborn MD on February 22, 2018 9:48 AM

## 2018-05-15 RX ORDER — LOSARTAN POTASSIUM AND HYDROCHLOROTHIAZIDE 12.5; 5 MG/1; MG/1
TABLET ORAL
Qty: 30 TABLET | Refills: 5 | Status: SHIPPED | OUTPATIENT
Start: 2018-05-15 | End: 2018-08-13

## 2018-05-15 RX ORDER — OMEPRAZOLE 20 MG/1
CAPSULE, DELAYED RELEASE ORAL
Qty: 30 CAPSULE | Refills: 5 | Status: SHIPPED | OUTPATIENT
Start: 2018-05-15 | End: 2018-10-18 | Stop reason: SDUPTHER

## 2018-08-05 ENCOUNTER — HOSPITAL ENCOUNTER (EMERGENCY)
Facility: HOSPITAL | Age: 78
Discharge: ANOTHER HOSPITAL | End: 2018-08-05
Attending: EMERGENCY MEDICINE | Admitting: EMERGENCY MEDICINE

## 2018-08-05 ENCOUNTER — APPOINTMENT (OUTPATIENT)
Dept: CT IMAGING | Facility: HOSPITAL | Age: 78
End: 2018-08-05

## 2018-08-05 ENCOUNTER — APPOINTMENT (OUTPATIENT)
Dept: GENERAL RADIOLOGY | Facility: HOSPITAL | Age: 78
End: 2018-08-05

## 2018-08-05 VITALS
HEIGHT: 63 IN | RESPIRATION RATE: 18 BRPM | TEMPERATURE: 97.8 F | SYSTOLIC BLOOD PRESSURE: 161 MMHG | DIASTOLIC BLOOD PRESSURE: 76 MMHG | OXYGEN SATURATION: 98 % | BODY MASS INDEX: 27.11 KG/M2 | WEIGHT: 153 LBS | HEART RATE: 98 BPM

## 2018-08-05 DIAGNOSIS — G45.9 TRANSIENT CEREBRAL ISCHEMIA, UNSPECIFIED TYPE: Primary | ICD-10-CM

## 2018-08-05 DIAGNOSIS — R20.2 PARESTHESIA: ICD-10-CM

## 2018-08-05 LAB
ALBUMIN SERPL-MCNC: 4.6 G/DL (ref 3.4–4.8)
ALBUMIN/GLOB SERPL: 1.2 G/DL (ref 1.1–1.8)
ALP SERPL-CCNC: 50 U/L (ref 38–126)
ALT SERPL W P-5'-P-CCNC: 28 U/L (ref 9–52)
ANION GAP SERPL CALCULATED.3IONS-SCNC: 10 MMOL/L (ref 5–15)
APTT PPP: 24.9 SECONDS (ref 20–40.3)
AST SERPL-CCNC: 45 U/L (ref 14–36)
BACTERIA UR QL AUTO: ABNORMAL /HPF
BASOPHILS # BLD AUTO: 0.02 10*3/MM3 (ref 0–0.2)
BASOPHILS NFR BLD AUTO: 0.3 % (ref 0–2)
BILIRUB SERPL-MCNC: 0.4 MG/DL (ref 0.2–1.3)
BILIRUB UR QL STRIP: NEGATIVE
BUN BLD-MCNC: 33 MG/DL (ref 7–21)
BUN/CREAT SERPL: 24.1 (ref 7–25)
CALCIUM SPEC-SCNC: 9.6 MG/DL (ref 8.4–10.2)
CHLORIDE SERPL-SCNC: 99 MMOL/L (ref 95–110)
CLARITY UR: CLEAR
CO2 SERPL-SCNC: 27 MMOL/L (ref 22–31)
COLOR UR: YELLOW
CREAT BLD-MCNC: 1.37 MG/DL (ref 0.5–1)
DEPRECATED RDW RBC AUTO: 42 FL (ref 36.4–46.3)
EOSINOPHIL # BLD AUTO: 0.06 10*3/MM3 (ref 0–0.7)
EOSINOPHIL NFR BLD AUTO: 0.8 % (ref 0–7)
ERYTHROCYTE [DISTWIDTH] IN BLOOD BY AUTOMATED COUNT: 12.3 % (ref 11.5–14.5)
GFR SERPL CREATININE-BSD FRML MDRD: 37 ML/MIN/1.73 (ref 39–90)
GLOBULIN UR ELPH-MCNC: 3.7 GM/DL (ref 2.3–3.5)
GLUCOSE BLD-MCNC: 114 MG/DL (ref 60–100)
GLUCOSE UR STRIP-MCNC: NEGATIVE MG/DL
HCT VFR BLD AUTO: 33.1 % (ref 35–45)
HGB BLD-MCNC: 11.3 G/DL (ref 12–15.5)
HGB UR QL STRIP.AUTO: NEGATIVE
HOLD SPECIMEN: NORMAL
HOLD SPECIMEN: NORMAL
HYALINE CASTS UR QL AUTO: ABNORMAL /LPF
IMM GRANULOCYTES # BLD: 0.01 10*3/MM3 (ref 0–0.02)
IMM GRANULOCYTES NFR BLD: 0.1 % (ref 0–0.5)
INR PPP: 0.89 (ref 0.8–1.2)
KETONES UR QL STRIP: NEGATIVE
LEUKOCYTE ESTERASE UR QL STRIP.AUTO: ABNORMAL
LYMPHOCYTES # BLD AUTO: 1.39 10*3/MM3 (ref 0.6–4.2)
LYMPHOCYTES NFR BLD AUTO: 19.2 % (ref 10–50)
MCH RBC QN AUTO: 31.7 PG (ref 26.5–34)
MCHC RBC AUTO-ENTMCNC: 34.1 G/DL (ref 31.4–36)
MCV RBC AUTO: 93 FL (ref 80–98)
MONOCYTES # BLD AUTO: 0.81 10*3/MM3 (ref 0–0.9)
MONOCYTES NFR BLD AUTO: 11.2 % (ref 0–12)
NEUTROPHILS # BLD AUTO: 4.94 10*3/MM3 (ref 2–8.6)
NEUTROPHILS NFR BLD AUTO: 68.4 % (ref 37–80)
NITRITE UR QL STRIP: NEGATIVE
PH UR STRIP.AUTO: 7.5 [PH] (ref 5–9)
PLATELET # BLD AUTO: 261 10*3/MM3 (ref 150–450)
PMV BLD AUTO: 9.9 FL (ref 8–12)
POTASSIUM BLD-SCNC: 4.4 MMOL/L (ref 3.5–5.1)
PROT SERPL-MCNC: 8.3 G/DL (ref 6.3–8.6)
PROT UR QL STRIP: NEGATIVE
PROTHROMBIN TIME: 11.9 SECONDS (ref 11.1–15.3)
RBC # BLD AUTO: 3.56 10*6/MM3 (ref 3.77–5.16)
RBC # UR: ABNORMAL /HPF
REF LAB TEST METHOD: ABNORMAL
SODIUM BLD-SCNC: 136 MMOL/L (ref 137–145)
SP GR UR STRIP: 1.01 (ref 1–1.03)
SQUAMOUS #/AREA URNS HPF: ABNORMAL /HPF
TROPONIN I SERPL-MCNC: <0.012 NG/ML
UROBILINOGEN UR QL STRIP: ABNORMAL
WBC NRBC COR # BLD: 7.23 10*3/MM3 (ref 3.2–9.8)
WBC UR QL AUTO: ABNORMAL /HPF
WHOLE BLOOD HOLD SPECIMEN: NORMAL
WHOLE BLOOD HOLD SPECIMEN: NORMAL

## 2018-08-05 PROCEDURE — 96360 HYDRATION IV INFUSION INIT: CPT

## 2018-08-05 PROCEDURE — 85730 THROMBOPLASTIN TIME PARTIAL: CPT | Performed by: EMERGENCY MEDICINE

## 2018-08-05 PROCEDURE — 84484 ASSAY OF TROPONIN QUANT: CPT | Performed by: EMERGENCY MEDICINE

## 2018-08-05 PROCEDURE — 93010 ELECTROCARDIOGRAM REPORT: CPT | Performed by: INTERNAL MEDICINE

## 2018-08-05 PROCEDURE — 81001 URINALYSIS AUTO W/SCOPE: CPT | Performed by: EMERGENCY MEDICINE

## 2018-08-05 PROCEDURE — 85610 PROTHROMBIN TIME: CPT | Performed by: EMERGENCY MEDICINE

## 2018-08-05 PROCEDURE — 70450 CT HEAD/BRAIN W/O DYE: CPT

## 2018-08-05 PROCEDURE — 99285 EMERGENCY DEPT VISIT HI MDM: CPT

## 2018-08-05 PROCEDURE — 85025 COMPLETE CBC W/AUTO DIFF WBC: CPT | Performed by: EMERGENCY MEDICINE

## 2018-08-05 PROCEDURE — 80053 COMPREHEN METABOLIC PANEL: CPT | Performed by: EMERGENCY MEDICINE

## 2018-08-05 PROCEDURE — 93005 ELECTROCARDIOGRAM TRACING: CPT | Performed by: EMERGENCY MEDICINE

## 2018-08-05 PROCEDURE — 71045 X-RAY EXAM CHEST 1 VIEW: CPT

## 2018-08-05 RX ORDER — CLONIDINE HYDROCHLORIDE 0.1 MG/1
TABLET ORAL
Status: COMPLETED
Start: 2018-08-05 | End: 2018-08-05

## 2018-08-05 RX ORDER — CLONIDINE HYDROCHLORIDE 0.1 MG/1
0.1 TABLET ORAL ONCE
Status: COMPLETED | OUTPATIENT
Start: 2018-08-05 | End: 2018-08-05

## 2018-08-05 RX ORDER — ASPIRIN 325 MG
325 TABLET ORAL ONCE
Status: COMPLETED | OUTPATIENT
Start: 2018-08-05 | End: 2018-08-05

## 2018-08-05 RX ADMIN — ASPIRIN 325 MG: 325 TABLET ORAL at 12:26

## 2018-08-05 RX ADMIN — CLONIDINE HYDROCHLORIDE 0.1 MG: 0.1 TABLET ORAL at 13:16

## 2018-08-05 RX ADMIN — SODIUM CHLORIDE 1000 ML: 9 INJECTION, SOLUTION INTRAVENOUS at 12:26

## 2018-08-05 NOTE — ED NOTES
I set up ambulance transport with paola at [facility]. He will call er back with rina

## 2018-08-05 NOTE — ED NOTES
transferring pt to  at [facility]. Nannette camejo spoke with transfer coor and faxed face sheet and she called radiology. I called Umair at [facility] to set up transport and he said can I call back in 10 minutes. I gave transfer packet and number to call report to Fernanda FLOWERS to [phone]

## 2018-08-05 NOTE — ED PROVIDER NOTES
Subjective   78 years old female with history of hypertension presented in the ER with a chief complaint of right-sided tingling and numbness.  Patient reports it started while she was preparing her breakfast around 7:30 this morning.  She had numbness of the right upper lip, arm/hand didn't lower extremity.  It lasted for a couple of hours and started to improve.  She was seen at urgent care and is sent in here for further evaluation.  She has no numbness on the face and lower extremity but has mild numbness/tingling right hand.  She has no focal weakness.  She denies any difficulty speech.  No visual symptoms.  No chest pain palpitations or shortness of breath.  She denies any history of stroke/TIA in the past.        History provided by:  Patient  Stroke   Presenting symptoms: sensory loss    Presenting symptoms: no weakness    Onset quality:  Sudden  Last known well:  730 am  Duration:  3 hours  Timing:  Constant  Progression:  Improving  Similar to previous episodes: no    Associated symptoms: paresthesias    Associated symptoms: no chest pain, no dizziness, no facial pain, no fall, no fever, no hearing loss, no nausea, no neck pain, no seizures, no tinnitus, no vertigo and no vomiting        Review of Systems   Constitutional: Negative for chills and fever.   HENT: Negative for congestion, hearing loss, sinus pressure and tinnitus.    Respiratory: Negative for chest tightness and shortness of breath.    Cardiovascular: Negative for chest pain.   Gastrointestinal: Negative for abdominal pain, nausea and vomiting.   Genitourinary: Negative for enuresis.   Musculoskeletal: Negative for back pain and neck pain.   Skin: Negative for color change.   Neurological: Positive for numbness and paresthesias. Negative for dizziness, vertigo, tremors, seizures, facial asymmetry, weakness and light-headedness.   Psychiatric/Behavioral: Negative for agitation.       Past Medical History:   Diagnosis Date   • Allergic rhinitis     • Artificial lens present     Artificial lens in position - left      • Benign hypertension    • Corns and callus    • Dry cough     most likely due to ACE inhibitor     • Essential hypertension    • GERD (gastroesophageal reflux disease)    • Glaucoma, both eyes    • Hammer toe    • History of Papanicolaou smear of cervix 01/19/2004    Atrophy present.   • Malabsorption of glucose    • Nondisplaced fracture of right radial styloid process, initial encounter for closed fracture    • Nuclear senile cataract of right eye    • Nuclear senile cataract of right eye    • Obesity    • Pain in right hand    • Primary open angle glaucoma     controlled left right IOP increased but OCT stable         Allergies   Allergen Reactions   • Sulfa Antibiotics        Past Surgical History:   Procedure Laterality Date   • BREAST SURGERY  11/22/1974    Cystic disease, right breast. Excision of lesion of right breast.   • ENDOSCOPY AND COLONOSCOPY  02/11/2008    A few diverticula in the sigmoid.   • ESOPHAGOSCOPY / EGD  03/10/2008    Grade II esophagitis of the distal esophagus. Multiple biopsies were obtained from the distal esophagus. The stomach appeared normal. The duodenum appeared normal   • EXTERNAL EAR SURGERY  04/25/2001    Full thickness wedge excision of the left ear, with layered closure   • EYE SURGERY  02/19/2013    Remove cataract, insert lens (1)    left eye    • MASTECTOMY  01/10/1991    Total mastectomy with level I and II node dissection 2   • OTHER SURGICAL HISTORY  03/20/2015    OCT DISC NFL 62393 (2) (Primary open angle glaucoma)        Family History   Problem Relation Age of Onset   • Cancer Other    • Diabetes Other    • Heart disease Other        Social History     Social History   • Marital status:      Social History Main Topics   • Smoking status: Never Smoker   • Smokeless tobacco: Never Used   • Alcohol use No   • Drug use: No   • Sexual activity: No     Other Topics Concern   • Not on file            Objective   Physical Exam   Constitutional: She is oriented to person, place, and time. She appears well-developed and well-nourished.   HENT:   Head: Normocephalic and atraumatic.   Right Ear: External ear normal.   Left Ear: External ear normal.   Nose: Nose normal.   Mouth/Throat: Oropharynx is clear and moist.   Eyes: Pupils are equal, round, and reactive to light. Conjunctivae and EOM are normal.   Neck: Normal range of motion. Neck supple.   Cardiovascular: Normal rate, regular rhythm and normal heart sounds.    Pulmonary/Chest: Effort normal and breath sounds normal.   Abdominal: Soft. There is no tenderness.   Musculoskeletal: Normal range of motion.   Neurological: She is alert and oriented to person, place, and time. She displays no atrophy, no tremor and normal reflexes. No cranial nerve deficit or sensory deficit. She exhibits normal muscle tone. Coordination normal. GCS eye subscore is 4. GCS verbal subscore is 5. GCS motor subscore is 6. She displays no Babinski's sign on the right side. She displays no Babinski's sign on the left side.   Reflex Scores:       Bicep reflexes are 2+ on the right side and 2+ on the left side.       Patellar reflexes are 2+ on the right side and 2+ on the left side.  Skin: Skin is warm and dry. Capillary refill takes less than 2 seconds.   Psychiatric: She has a normal mood and affect.   Nursing note and vitals reviewed.      ECG 12 Lead    Date/Time: 8/5/2018 11:20 AM  Performed by: JH CERON  Authorized by: JH CERON   Interpreted by physician  Rhythm: sinus rhythm  Rate: normal  BPM: 90  QRS axis: normal  Conduction: conduction normal  ST Segments: ST segments normal  T Waves: T waves normal  Other: no other findings  Clinical impression: normal ECG                 ED Course                  MDM  Number of Diagnoses or Management Options  Paresthesia:   Transient cerebral ischemia, unspecified type:   Diagnosis management comments: 78 years old presented  in the ER with right-sided numbness and tingling which started around 7:30 this morning and started to improve in a couple of hours.  By the time patient presented in the ER her facial and lower extremity numbness was gone and had minimal tingling sensation in the right hand but no weakness at all.  She is  stroke activated.  CT head is negative.  She is given full dose aspirin.  EKG showed normal sinus rhythm with no acute ST changes.  Negative troponin.  She has mild worsening of her renal function.  She is given IV fluids.  No other acute finding on the workup.  On reevaluation her symptoms on the right hand is almost gone.  I believe patient has TIA.  Since we do not have neurology coverage and patient cannot be admitted here, I have discussed with Dr. Arnold at [facility] and patient is accepted for transfer.       Amount and/or Complexity of Data Reviewed  Clinical lab tests: ordered and reviewed  Tests in the radiology section of CPT®: ordered and reviewed  Decide to obtain previous medical records or to obtain history from someone other than the patient: yes  Discuss the patient with other providers: yes      Labs Reviewed   COMPREHENSIVE METABOLIC PANEL - Abnormal; Notable for the following:        Result Value    Glucose 114 (*)     BUN 33 (*)     Creatinine 1.37 (*)     Sodium 136 (*)     AST (SGOT) 45 (*)     eGFR Non  Amer 37 (*)     Globulin 3.7 (*)     All other components within normal limits    Narrative:     The MDRD GFR formula is only valid for adults with stable renal function between ages 18 and 70.   URINALYSIS W/ MICROSCOPIC IF INDICATED (NO CULTURE) - Abnormal; Notable for the following:     Leuk Esterase, UA Trace (*)     All other components within normal limits   CBC WITH AUTO DIFFERENTIAL - Abnormal; Notable for the following:     RBC 3.56 (*)     Hemoglobin 11.3 (*)     Hematocrit 33.1 (*)     All other components within normal limits   URINALYSIS, MICROSCOPIC  ONLY - Abnormal; Notable for the following:     RBC, UA 0-2 (*)     WBC, UA 6-12 (*)     All other components within normal limits   PROTIME-INR - Normal    Narrative:     Therapeutic range for most indications is 2.0-3.0 INR,  or 2.5-3.5 for mechanical heart valves.   APTT - Normal    Narrative:     The recommended Heparin therapeutic range is 68-97 seconds.   TROPONIN (IN-HOUSE) - Normal   RAINBOW DRAW    Narrative:     The following orders were created for panel order [place] Draw.  Procedure                               Abnormality         Status                     ---------                               -----------         ------                     Light Blue Top[[id number]]                                   Final result               Green Top (Gel)[[id number]]                                  Final result               Lavender Top[[id number]]                                     Final result               Gold Top - SST[[id number]]                                   Final result                 Please view results for these tests on the individual orders.   CBC AND DIFFERENTIAL    Narrative:     The following orders were created for panel order CBC & Differential.  Procedure                               Abnormality         Status                     ---------                               -----------         ------                     CBC Auto Differential[[id number]]        Abnormal            Final result                 Please view results for these tests on the individual orders.   LIGHT BLUE TOP   GREEN TOP   LAVENDER TOP   GOLD TOP - SST       Ct Head Without Contrast    Result Date: 8/5/2018  Narrative: .    EXAMINATION:  Computed Tomography    REGION:  Head         INDICATION:  Sensation loss  (mental status changes) HISTORY: CORRELATIVE IMAGING:    CT head 5/22/13  TECHNIQUE:  iv contrast:  no  This exam was performed according to the departmental dose-optimization program which includes automated  exposure control, adjustment of the mA and/or kV according to patient size and/or use of iterative reconstruction technique.          COMMENTS:            - atrophy:              wnl for age   - cortex:                Mild  age related degenerated changes   - deep white mat:  Mild  age related degenerated changes     - hemorrhage:       none     - fluid collection: no intra/extra axial fluid collection   - mass / lesion:     no focal parenchymal lesion(s)     - gray/white jxn:   borders preserved       - brain stem:         wnl     - cerebellum:        wnl     - globes / retro:     wnl     - ventricles:          normal size / configuration     - midline shift:      no     - sinuses:              wnl     - mastoids:           wnl      - osseous:             wnl     - misc.: .       Impression: CONCLUSION:  1.  Negative examination for acute intracranial pathology.       If signs or symptoms persist beyond reasonable expectations, a MRI examination is suggested as is deemed clinically appropriate.        Electronically signed by:  MESERET Luu MD  8/5/2018 11:26 AM CDT Workstation: [id number]    Xr Chest 1 View    Result Date: 8/5/2018  Narrative: EXAM:         Radiograph(s), Chest VIEWS:   Portable ; 1     DATE/TIME:  8/5/2018 11:23 AM CDT            INDICATION:   Mental status changes  COMPARISON:  CXR: none         FINDINGS:         - lines/tubes:    none   - cardiac:         size within normal limits       - mediastinum: contour within normal limits       - lungs:         no focal air space process, pulmonary interstitial edema, nodule(s)/mass           - pleura:         no evidence of  fluid                - osseous:         unremarkable for age                - misc.:        Impression: CONCLUSION:    1. No evidence of an active cardiopulmonary process.                                  Electronically signed by:  MESERET Luu MD  8/5/2018 11:24 AM CDT Workstation: [id number]          Final diagnoses:    Transient cerebral ischemia, unspecified type   Paresthesia            Ricky Hampton MD  08/05/18 [id number]

## 2018-08-10 ENCOUNTER — HOSPITAL ENCOUNTER (OUTPATIENT)
Dept: CT IMAGING | Facility: HOSPITAL | Age: 78
Discharge: HOME | End: 2018-08-10
Admitting: NURSE PRACTITIONER

## 2018-08-10 DIAGNOSIS — G45.9 TRANSIENT CEREBRAL ISCHEMIA, UNSPECIFIED TYPE: ICD-10-CM

## 2018-08-10 PROCEDURE — 0 IODIXANOL PER 1 ML: Performed by: NURSE PRACTITIONER

## 2018-08-10 PROCEDURE — 71260 CT THORAX DX C+: CPT

## 2018-08-10 RX ORDER — IODIXANOL 320 MG/ML
100 INJECTION, SOLUTION INTRAVASCULAR
Status: COMPLETED | OUTPATIENT
Start: 2018-08-10 | End: 2018-08-10

## 2018-08-10 RX ADMIN — IODIXANOL 80 ML: 320 INJECTION, SOLUTION INTRAVASCULAR at 16:17

## 2018-08-13 ENCOUNTER — OFFICE VISIT (OUTPATIENT)
Dept: INTERNAL MEDICINE | Facility: CLINIC | Age: 78
End: 2018-08-13

## 2018-08-13 VITALS
BODY MASS INDEX: 27.62 KG/M2 | WEIGHT: 155.9 LBS | DIASTOLIC BLOOD PRESSURE: 60 MMHG | HEIGHT: 63 IN | SYSTOLIC BLOOD PRESSURE: 160 MMHG

## 2018-08-13 DIAGNOSIS — R73.03 PREDIABETES: ICD-10-CM

## 2018-08-13 DIAGNOSIS — N18.9 CHRONIC RENAL IMPAIRMENT, UNSPECIFIED CKD STAGE: ICD-10-CM

## 2018-08-13 DIAGNOSIS — I10 ESSENTIAL HYPERTENSION: ICD-10-CM

## 2018-08-13 DIAGNOSIS — G45.9 TRANSIENT CEREBRAL ISCHEMIA, UNSPECIFIED TYPE: Primary | ICD-10-CM

## 2018-08-13 PROCEDURE — 99214 OFFICE O/P EST MOD 30 MIN: CPT | Performed by: INTERNAL MEDICINE

## 2018-08-13 RX ORDER — LOSARTAN POTASSIUM AND HYDROCHLOROTHIAZIDE 12.5; 1 MG/1; MG/1
1 TABLET ORAL DAILY
Qty: 30 TABLET | Refills: 5 | Status: SHIPPED | OUTPATIENT
Start: 2018-08-13 | End: 2018-10-18 | Stop reason: SDUPTHER

## 2018-08-13 RX ORDER — ASPIRIN 81 MG/1
81 TABLET ORAL
COMMUNITY
Start: 2018-08-07 | End: 2019-02-04 | Stop reason: SDUPTHER

## 2018-08-13 RX ORDER — ATORVASTATIN CALCIUM 40 MG/1
40 TABLET, FILM COATED ORAL
COMMUNITY
Start: 2018-08-06 | End: 2019-04-18 | Stop reason: SDUPTHER

## 2018-08-13 NOTE — PROGRESS NOTES
"Subjective   Lyndsay Brumfield is a 78 y.o. female with PMH of HTN, renal insufficiency who comes for recheck after recent admission to [facility] for TIA.    See information below re: hospitalization.    Date Type Department Care Team Description   08/05/2018 - 08/06/2018 Hospital Encounter Observation [address]    [address] [place] [address], [place] [address] [phone]   Deepak Weaver MD    [address] [place] [address], [place] [address] [phone] (Fax)       Erendira Jasmine MD    [address] [place] [address], [place] [address] [phone] (Fax)       Chelsea Kirby MD    [address] [place] [address], [place] [address] [phone] (Fax)       Carmen Arana DO    [address] [place] [address], [place] [address] [phone] (Fax)   Transient cerebral ischemia, unspecified type (Primary Dx);   Referral of patient     Social History  - as of this encounter    Tobacco Use Types Packs/Day Years Used Date   Never Assessed             Sex Assigned at Birth Date Recorded   Not on file       Last Filed Vital Signs  - in this encounter    Vital Sign Reading Time Taken   Blood Pressure 159/69 08/06/2018 7:25 AM CDT   Pulse 82 08/06/2018 7:25 AM CDT   Temperature 36.7 °C (98 °F) 08/06/2018 7:25 AM CDT   Respiratory Rate 18 08/06/2018 7:25 AM CDT   Oxygen Saturation 99% 08/06/2018 7:25 AM CDT   Inhaled Oxygen Concentration - -   Weight 69.4 kg (153 lb) 08/05/2018 4:28 PM CDT   Height 160 cm (5' 3\") 08/05/2018 4:28 PM CDT   Body Mass Index 27.1 08/05/2018 4:28 PM CDT     Functional Status  - as of this encounter  Functional Status Response Date of Assessment   Are you deaf or do you have serious difficulty hearing? No 08/06/2018   Are you blind or do you have serious difficulty seeing, even when wearing glasses? No 08/06/2018   Do you have serious difficulty walking or climbing stairs? No 08/06/2018   Do you have difficulty dressing or bathing? No 08/06/2018   Because " of a physical, mental, or emotional condition, do you have difficulty doing errands alone such as visiting a doctor's office or shopping? No 08/06/2018     Cognitive Status Response Date of Assessment   Because of a physical, mental, or emotional condition, do you have serious difficulty concentrating, remembering, or making decisions? No 08/06/2018     Discharge Summaries  - in this encounter    Sherly Bradley NP - 08/06/2018 11:43 AM CDT  Formatting of this note may be different from the original.  [facility]  Patient Discharge Summary - 8/6/2018 11:44 AM    Patient Name: Lyndsay Brumfield YOB: 1940 MRN: [id number]  Date of Service: 8/6/2018    Admit Date: 8/5/2018   Discharge Date: 8/6/2018    Admitting Physician: Carmen Arana DO  Attending Physician: SHERLY BRADLEY NP  Primary Care Physician: Graciela Osborn    1. Admission Diagnosis:  TIA    2. Discharge Diagnosis:     TIA (transient ischemic attack), no further complaints of numbness or tingling since arrival here, patient reported elevated BP and home and [facility], likely TIA, will be discharged home to resume home medications with the addition of aspirin and statin. Echo did show Ill-defined echo lucent structure in relation with the superior spect of the right atrium, Will have op CT chest and f/u with PCP for results.     Paresthesias, resolved    Essential hypertension, controlled after admission, discharge home with home Hyzaar, follow up with PCP He.    3. Consultants:   Neurology    4. Procedures/Interventions/Operations:   CTA head and neck  IMPRESSION:  1. Atherosclerosis at both carotid bifurcations without stenosis.   2. No intracranial aneurysm, stenosis or large vessel occlusion.     Echocardiogram  1. Normal left ventricular systolic function.  2. No apparent intracardiac masses, thrombi or vegetation on this  transthoracic echocardiogram.  3. Bubble study negative for right-to-left shunt.  4.  Ill-defined echo lucent structure in relation with the superior  aspect of the right atrium, as described above.  5. Mild tricuspid regurgitation with PA systolic pressure 45 to 50.  Mild mitral regurgitation.       5. Hospital Course: Lyndsay Brumfield is a 78 y.o. female with PMH of HTN, GERD who presents to [facility] from [facility] with right sided numbness/tingling this morning. She reports having tingling/numbness of the right side, including her lips, finger tips and toes. No left sided symptoms. Symptoms were improved by time she made it to the ED. She denied having symptoms before. In the ED, SBP in the 180's. She reports possibly not taking her BP medication today.  Labs only remarkable for cre 1.37. CT head was negative. Denied speech difficulties, headache, dizziness, gait disturbance. She was transferred here for neurology evaluation.    Patient remained stable overnight, afebrile, no further complaints of numbness, tingling. Patient was seen by Neurology, MRI of brain completed negative for acute abnormality, CTA head and neck completed showing atherosclerosis of both carotid bifurcations with no stenosis, no intracranial aneurysm, stenosis or large vessel occlusion. Echocardiogram showed EF, 60-65%, with Ill-defined echo lucent structure in relation with the superior aspect of the right atrium, as described above.Neurology recommends aspirin and statin therapy 1st stroke prophylaxis. Advised blood pressure management.         Patient is doing better, and denies any new complaints.  Her BP is somewhat elevated. She has no cardiovascular complaints.    She was statrted on ASA and Lipitor for secondary prevention.    Past Medical History:   Diagnosis Date   • Allergic rhinitis    • Artificial lens present     Artificial lens in position - left      • Benign hypertension    • Corns and callus    • Dry cough     most likely due to ACE inhibitor     • Essential hypertension    • GERD (gastroesophageal  reflux disease)    • Glaucoma, both eyes    • Hammer toe    • History of Papanicolaou smear of cervix 01/19/2004    Atrophy present.   • Malabsorption of glucose    • Nondisplaced fracture of right radial styloid process, initial encounter for closed fracture    • Nuclear senile cataract of right eye    • Nuclear senile cataract of right eye    • Obesity    • Pain in right hand    • Primary open angle glaucoma     controlled left right IOP increased but OCT stable       Past Surgical History:   Procedure Laterality Date   • BREAST SURGERY  11/22/1974    Cystic disease, right breast. Excision of lesion of right breast.   • ENDOSCOPY AND COLONOSCOPY  02/11/2008    A few diverticula in the sigmoid.   • ESOPHAGOSCOPY / EGD  03/10/2008    Grade II esophagitis of the distal esophagus. Multiple biopsies were obtained from the distal esophagus. The stomach appeared normal. The duodenum appeared normal   • EXTERNAL EAR SURGERY  04/25/2001    Full thickness wedge excision of the left ear, with layered closure   • EYE SURGERY  02/19/2013    Remove cataract, insert lens (1)    left eye    • MASTECTOMY  01/10/1991    Total mastectomy with level I and II node dissection 2   • OTHER SURGICAL HISTORY  03/20/2015    OCT DISC NFL 48903 (2) (Primary open angle glaucoma)        Social History   Substance Use Topics   • Smoking status: Never Smoker   • Smokeless tobacco: Never Used   • Alcohol use No     Family History   Problem Relation Age of Onset   • Cancer Other    • Diabetes Other    • Heart disease Other      Allergies   Allergen Reactions   • Sulfa Antibiotics Itching     Current Outpatient Prescriptions on File Prior to Visit   Medication Sig Dispense Refill   • Calcium-Magnesium-Vitamin D (CALCIUM 500 PO) Take  by mouth.     • latanoprost (XALATAN) 0.005 % ophthalmic solution 1 drop Every Night.     • Multiple Vitamins-Minerals (MULTIVITAMIN WITH MINERALS) tablet tablet Take 1 tablet by mouth Daily.     • omeprazole (priLOSEC)  20 MG capsule TAKE 1 CAPSULE BY MOUTH EVERY DAY 30 capsule 5   • polycarbophil (FIBERCON) 625 MG tablet Take 625 mg by mouth Daily.     • [DISCONTINUED] losartan-hydrochlorothiazide (HYZAAR) 50-12.5 MG per tablet TAKE 1 TABLET BY MOUTH EVERY DAY 30 tablet 5     No current facility-administered medications on file prior to visit.      Review of Systems   Constitutional: Negative for activity change and fatigue.   Eyes: Negative.    Respiratory: Negative for chest tightness and shortness of breath.    Cardiovascular: Negative for chest pain and leg swelling.   Gastrointestinal: Negative for constipation and diarrhea.        GERD   Endocrine: Negative for cold intolerance and heat intolerance.   Musculoskeletal: Negative for joint swelling.   Neurological: Negative for dizziness, light-headedness and headaches.   Psychiatric/Behavioral: Negative for sleep disturbance. The patient is not nervous/anxious.        Objective   Physical Exam   Constitutional: She is oriented to person, place, and time. She appears well-developed and well-nourished.   HENT:   Head: Normocephalic and atraumatic.   Nose: Nose normal.   Mouth/Throat: Oropharynx is clear and moist.   Neck: Normal range of motion. Neck supple. No JVD present.   Cardiovascular: Normal rate and regular rhythm.    Pulmonary/Chest: Effort normal and breath sounds normal.   Abdominal: Soft. Bowel sounds are normal.   Musculoskeletal: She exhibits no tenderness or deformity.   Neurological: She is alert and oriented to person, place, and time.   Skin: Skin is warm and dry. No rash noted.   Psychiatric: She has a normal mood and affect. Her behavior is normal. Judgment and thought content normal.   Nursing note and vitals reviewed.          Patient Active Problem List   Diagnosis   • Chest pain   • Essential hypertension   • Gastroesophageal reflux disease   • Primary open angle glaucoma   • Pain in right hand   • Obesity   • Nuclear senile cataract of right eye   •  Nuclear senile cataract of right eye   • Nondisplaced fracture of right radial styloid process, initial encounter for closed fracture   • Malabsorption of glucose   • History of Papanicolaou smear of cervix   • Hammer toe   • Glaucoma, both eyes   • GERD (gastroesophageal reflux disease)   • Dry cough   • Benign hypertension   • Artificial lens present   • Allergic rhinitis         CT Chest With Contrast [[id number]] (Order [id number])   Order   Status: Final result     Result Impression   IMPRESSION:  1.  Atherosclerosis at both carotid bifurcations without stenosis.    2. No intracranial aneurysm, stenosis or large vessel occlusion.   Result Narrative   HISTORY:  Right-sided numbness and tingling.    COMPARISON:  8/5/2018    TECHNIQUE:  IV contrast was administered and axial images from the calvarium through the  thoracic inlet were performed.  3D multiplanar reformats of the [place], carotid and vertebral arteries were completed on a separate workstation  under concurrent supervision.    FINDINGS:  Precontrast images demonstrate no intracranial hemorrhage. There is no abnormal  enhancement within the brain parenchyma. Lung apices are clear. Great vessel  origins are patent. There is mild atherosclerosis of the right carotid  bifurcation without stenosis. There is moderate atherosclerosis of the left  carotid bifurcation without stenosis. Cervical vertebral arteries are widely  patent. There is no intracranial aneurysm, stenosis or large vessel occlusion.   Other Result Information   Francisco Javier, Rad Results In - 08/05/2018  8:06 PM CDT  HISTORY:  Right-sided numbness and tingling.    COMPARISON:  8/5/2018    TECHNIQUE:  IV contrast was administered and axial images from the calvarium through the  thoracic inlet were performed.  3D multiplanar reformats of the [place], carotid and vertebral arteries were completed on a separate workstation  under concurrent supervision.    FINDINGS:  Precontrast images  demonstrate no intracranial hemorrhage. There is no abnormal  enhancement within the brain parenchyma. Lung apices are clear. Great vessel  origins are patent. There is mild atherosclerosis of the right carotid  bifurcation without stenosis. There is moderate atherosclerosis of the left  carotid bifurcation without stenosis. Cervical vertebral arteries are widely  patent. There is no intracranial aneurysm, stenosis or large vessel occlusion.      IMPRESSION:  1.  Atherosclerosis at both carotid bifurcations without stenosis.    2. No intracranial aneurysm, stenosis or large vessel occlusion.   Status     IMPRESSION:  1.  Negative for acute infarction.    2. Minimal small vessel ischemic change.    3. Mild atrophy.   Result Narrative   HISTORY:  Stroke.    COMPARISON:  None    TECHNIQUE:  Multi-planar, multi-sequence images were obtained through the brain.  No IV  contrast was administered.    FINDINGS:  There is no restricted diffusion to suggest acute infarction. There is no mass,  hemorrhage or hydrocephalus. There is minimal small vessel ischemic change.   Other Result Information   Francisco Javier, Rad Results In - 08/05/2018  6:49 PM CDT  HISTORY:  Stroke.    COMPARISON:  None    TECHNIQUE:  Multi-planar, multi-sequence images were obtained through the brain.  No IV  contrast was administered.    FINDINGS:  There is no restricted diffusion to suggest acute infarction. There is no mass,  hemorrhage or hydrocephalus. There is minimal small vessel ischemic change.      IMPRESSION:  1.  Negative for acute infarction.    2. Minimal small vessel ischemic change.    3. Mild atrophy.   Status            Study Result     CT chest with contrast.         CLINICAL INDICATION: Abnormal mediastinal lesion seen on  echocardiography.     COMPARISON: Chest x-ray August 5, 2018.     TECHNIQUE: Nonionic IV contrast. 80 mL Visipaque 320. Helical  scanning with axial and coronal reformations. Soft tissue, lung,  liver, and bone windows  reviewed.     This exam was performed according to our departmental  dose-optimization program, which includes automated exposure  control, adjustment of the mA and/or kV according to patient size  and/or use of iterative reconstruction technique.     CHEST CT FINDINGS: Pleuroparenchymal scarring in both apices. No  pathologic hilar or mediastinal adenopathy. Normal aorta.  Moderate size hiatus hernia. Degenerative changes thoracic spine.  No effusions.     IMPRESSION:  CONCLUSION: Pleural-parenchymal scarring in both apices worse on  the right on the left. Lungs are otherwise clear. No dense  consolidation or masses. No hilar or mediastinal adenopathy.  Moderate size hiatus hernia. CT chest with contrast is otherwise  unremarkable.     Electronically signed by:  Jovon Peralta MD  8/11/2018 1:15 PM CDT  Workstation: [id number]       Admission on 08/05/2018, Discharged on 08/05/2018   Component Date Value Ref Range Status   • Glucose 08/05/2018 114* 60 - 100 mg/dL Final   • BUN 08/05/2018 33* 7 - 21 mg/dL Final   • Creatinine 08/05/2018 1.37* 0.50 - 1.00 mg/dL Final   • Sodium 08/05/2018 136* 137 - 145 mmol/L Final   • Potassium 08/05/2018 4.4  3.5 - 5.1 mmol/L Final   • Chloride 08/05/2018 99  95 - 110 mmol/L Final   • CO2 08/05/2018 27.0  22.0 - 31.0 mmol/L Final   • Calcium 08/05/2018 9.6  8.4 - 10.2 mg/dL Final   • Total Protein 08/05/2018 8.3  6.3 - 8.6 g/dL Final   • Albumin 08/05/2018 4.60  3.40 - 4.80 g/dL Final   • ALT (SGPT) 08/05/2018 28  9 - 52 U/L Final   • AST (SGOT) 08/05/2018 45* 14 - 36 U/L Final   • Alkaline Phosphatase 08/05/2018 50  38 - 126 U/L Final   • Total Bilirubin 08/05/2018 0.4  0.2 - 1.3 mg/dL Final   • eGFR Non African Amer 08/05/2018 37* 39 - 90 mL/min/1.73 Final   • Globulin 08/05/2018 3.7* 2.3 - 3.5 gm/dL Final   • A/G Ratio 08/05/2018 1.2  1.1 - 1.8 g/dL Final   • BUN/Creatinine Ratio 08/05/2018 24.1  7.0 - 25.0 Final   • Anion Gap 08/05/2018 10.0  5.0 - 15.0 mmol/L Final   • Protime  08/05/2018 11.9  11.1 - 15.3 Seconds Final   • INR 08/05/2018 0.89  0.80 - 1.20 Final   • PTT 08/05/2018 24.9  20.0 - 40.3 seconds Final   • Color, UA 08/05/2018 Yellow  Yellow, Straw, Dark Yellow, Dalia Final   • Appearance, UA 08/05/2018 Clear  Clear Final   • pH, UA 08/05/2018 7.5  5.0 - 9.0 Final   • Specific Gravity, UA 08/05/2018 1.012  1.003 - 1.030 Final   • Glucose, UA 08/05/2018 Negative  Negative Final   • Ketones, UA 08/05/2018 Negative  Negative Final   • Bilirubin, UA 08/05/2018 Negative  Negative Final   • Blood, UA 08/05/2018 Negative  Negative Final   • Protein, UA 08/05/2018 Negative  Negative Final   • Leuk Esterase, UA 08/05/2018 Trace* Negative Final   • Nitrite, UA 08/05/2018 Negative  Negative Final   • Urobilinogen, UA 08/05/2018 0.2 E.U./dL  0.2 - 1.0 E.U./dL Final   • Troponin I 08/05/2018 <0.012  <=0.034 ng/mL Final   • WBC 08/05/2018 7.23  3.20 - 9.80 10*3/mm3 Final   • RBC 08/05/2018 3.56* 3.77 - 5.16 10*6/mm3 Final   • Hemoglobin 08/05/2018 11.3* 12.0 - 15.5 g/dL Final   • Hematocrit 08/05/2018 33.1* 35.0 - 45.0 % Final   • MCV 08/05/2018 93.0  80.0 - 98.0 fL Final   • MCH 08/05/2018 31.7  26.5 - 34.0 pg Final   • MCHC 08/05/2018 34.1  31.4 - 36.0 g/dL Final   • RDW 08/05/2018 12.3  11.5 - 14.5 % Final   • RDW-SD 08/05/2018 42.0  36.4 - 46.3 fl Final   • MPV 08/05/2018 9.9  8.0 - 12.0 fL Final   • Platelets 08/05/2018 261  150 - 450 10*3/mm3 Final   • Neutrophil % 08/05/2018 68.4  37.0 - 80.0 % Final   • Lymphocyte % 08/05/2018 19.2  10.0 - 50.0 % Final   • Monocyte % 08/05/2018 11.2  0.0 - 12.0 % Final   • Eosinophil % 08/05/2018 0.8  0.0 - 7.0 % Final   • Basophil % 08/05/2018 0.3  0.0 - 2.0 % Final   • Immature Grans % 08/05/2018 0.1  0.0 - 0.5 % Final   • Neutrophils, Absolute 08/05/2018 4.94  2.00 - 8.60 10*3/mm3 Final   • Lymphocytes, Absolute 08/05/2018 1.39  0.60 - 4.20 10*3/mm3 Final   • Monocytes, Absolute 08/05/2018 0.81  0.00 - 0.90 10*3/mm3 Final   • Eosinophils, Absolute  08/05/2018 0.06  0.00 - 0.70 10*3/mm3 Final   • Basophils, Absolute 08/05/2018 0.02  0.00 - 0.20 10*3/mm3 Final   • Immature Grans, Absolute 08/05/2018 0.01  0.00 - 0.02 10*3/mm3 Final   • Extra Tube 08/05/2018 hold for add-on   Final   • Extra Tube 08/05/2018 Hold for add-ons.   Final   • Extra Tube 08/05/2018 hold for add-on   Final   • Extra Tube 08/05/2018 Hold for add-ons.   Final   • RBC, UA 08/05/2018 0-2* None Seen /HPF Final   • WBC, UA 08/05/2018 6-12* None Seen, 0-2, 3-5 /HPF Final   • Bacteria, UA 08/05/2018 None Seen  None Seen /HPF Final   • Squamous Epithelial Cells, UA 08/05/2018 None Seen  None Seen, 0-2 /HPF Final   • Hyaline Casts, UA 08/05/2018 None Seen  None Seen /LPF Final   • Methodology 08/05/2018 Automated Microscopy   Final         Assessment/Plan   Lyndsay was seen today for transient ischemic attack.    Diagnoses and all orders for this visit:    Transient cerebral ischemia, unspecified type  -     Lipid Panel; Future    Essential hypertension  -     Comprehensive Metabolic Panel; Future  -     CBC & Differential; Future  -     TSH; Future    Chronic renal impairment, unspecified CKD stage  -     Comprehensive Metabolic Panel; Future  -     CBC & Differential; Future    Prediabetes  -     Hemoglobin A1c; Future    BMI 27.0-27.9,adult    Other orders  -     losartan-hydrochlorothiazide (HYZAAR) 100-12.5 MG per tablet; Take 1 tablet by mouth Daily.             Plan        1. Patient is doing better.  ASA. Statin.   2. Hyzaar. Dose increase.  DASH.  3.Lipitor.  Medication benefits and side effects discussed in detail.  Will monitor lipids and LFTs.  4.Renal function will be monitored.  Advised to avoid NSAIDs and other nephrotoxins.  5. HgbA1c.  Discussed diagnosis and prognosis.  ADA diet.          Follow up in 3 months.          This document has been electronically signed by Graciela Osborn MD on August 13, 2018 4:28 PM

## 2018-09-12 ENCOUNTER — LAB (OUTPATIENT)
Dept: LAB | Facility: HOSPITAL | Age: 78
End: 2018-09-12

## 2018-09-12 DIAGNOSIS — N18.9 RENAL FAILURE, CHRONIC, UNSPECIFIED STAGE: Primary | ICD-10-CM

## 2018-09-12 DIAGNOSIS — D53.9 ANEMIA, DEFICIENCY: Primary | ICD-10-CM

## 2018-09-12 DIAGNOSIS — G45.9 TRANSIENT CEREBRAL ISCHEMIA, UNSPECIFIED TYPE: ICD-10-CM

## 2018-09-12 DIAGNOSIS — R73.03 PREDIABETES: ICD-10-CM

## 2018-09-12 DIAGNOSIS — D53.9 ANEMIA, DEFICIENCY: ICD-10-CM

## 2018-09-12 DIAGNOSIS — I10 ESSENTIAL HYPERTENSION: ICD-10-CM

## 2018-09-12 DIAGNOSIS — N18.9 CHRONIC RENAL IMPAIRMENT, UNSPECIFIED CKD STAGE: ICD-10-CM

## 2018-09-12 LAB
ALBUMIN SERPL-MCNC: 4.2 G/DL (ref 3.4–4.8)
ALBUMIN/GLOB SERPL: 1.2 G/DL (ref 1.1–1.8)
ALP SERPL-CCNC: 45 U/L (ref 38–126)
ALT SERPL W P-5'-P-CCNC: 28 U/L (ref 9–52)
ANION GAP SERPL CALCULATED.3IONS-SCNC: 10 MMOL/L (ref 5–15)
ARTICHOKE IGE QN: 45 MG/DL (ref 1–129)
AST SERPL-CCNC: 44 U/L (ref 14–36)
BASOPHILS # BLD AUTO: 0.03 10*3/MM3 (ref 0–0.2)
BASOPHILS NFR BLD AUTO: 0.5 % (ref 0–2)
BILIRUB SERPL-MCNC: 0.5 MG/DL (ref 0.2–1.3)
BUN BLD-MCNC: 29 MG/DL (ref 7–21)
BUN/CREAT SERPL: 19.6 (ref 7–25)
CALCIUM SPEC-SCNC: 9.5 MG/DL (ref 8.4–10.2)
CHLORIDE SERPL-SCNC: 102 MMOL/L (ref 95–110)
CHOLEST SERPL-MCNC: 140 MG/DL (ref 0–199)
CO2 SERPL-SCNC: 26 MMOL/L (ref 22–31)
CREAT BLD-MCNC: 1.48 MG/DL (ref 0.5–1)
DEPRECATED RDW RBC AUTO: 43.2 FL (ref 36.4–46.3)
EOSINOPHIL # BLD AUTO: 0.28 10*3/MM3 (ref 0–0.7)
EOSINOPHIL NFR BLD AUTO: 4.8 % (ref 0–7)
ERYTHROCYTE [DISTWIDTH] IN BLOOD BY AUTOMATED COUNT: 12.6 % (ref 11.5–14.5)
FERRITIN SERPL-MCNC: 74.1 NG/ML (ref 11.1–264)
FOLATE SERPL-MCNC: >20 NG/ML (ref 2.76–21)
GFR SERPL CREATININE-BSD FRML MDRD: 34 ML/MIN/1.73 (ref 39–90)
GLOBULIN UR ELPH-MCNC: 3.4 GM/DL (ref 2.3–3.5)
GLUCOSE BLD-MCNC: 109 MG/DL (ref 60–100)
HBA1C MFR BLD: 6.2 % (ref 4–5.6)
HCT VFR BLD AUTO: 33.3 % (ref 35–45)
HDLC SERPL-MCNC: 66 MG/DL (ref 60–200)
HGB BLD-MCNC: 11.2 G/DL (ref 12–15.5)
IMM GRANULOCYTES # BLD: 0.01 10*3/MM3 (ref 0–0.02)
IMM GRANULOCYTES NFR BLD: 0.2 % (ref 0–0.5)
IRON 24H UR-MRATE: 110 MCG/DL (ref 37–170)
IRON SATN MFR SERPL: 36 % (ref 15–50)
LDLC/HDLC SERPL: 0.91 {RATIO} (ref 0–3.22)
LYMPHOCYTES # BLD AUTO: 2 10*3/MM3 (ref 0.6–4.2)
LYMPHOCYTES NFR BLD AUTO: 34.2 % (ref 10–50)
MCH RBC QN AUTO: 31.8 PG (ref 26.5–34)
MCHC RBC AUTO-ENTMCNC: 33.6 G/DL (ref 31.4–36)
MCV RBC AUTO: 94.6 FL (ref 80–98)
MONOCYTES # BLD AUTO: 0.62 10*3/MM3 (ref 0–0.9)
MONOCYTES NFR BLD AUTO: 10.6 % (ref 0–12)
NEUTROPHILS # BLD AUTO: 2.9 10*3/MM3 (ref 2–8.6)
NEUTROPHILS NFR BLD AUTO: 49.7 % (ref 37–80)
PLATELET # BLD AUTO: 276 10*3/MM3 (ref 150–450)
PMV BLD AUTO: 10 FL (ref 8–12)
POTASSIUM BLD-SCNC: 4.2 MMOL/L (ref 3.5–5.1)
PROT SERPL-MCNC: 7.6 G/DL (ref 6.3–8.6)
RBC # BLD AUTO: 3.52 10*6/MM3 (ref 3.77–5.16)
SODIUM BLD-SCNC: 138 MMOL/L (ref 137–145)
TIBC SERPL-MCNC: 306 MCG/DL (ref 265–497)
TRIGL SERPL-MCNC: 70 MG/DL (ref 20–199)
TSH SERPL DL<=0.05 MIU/L-ACNC: 0.75 MIU/ML (ref 0.46–4.68)
VIT B12 BLD-MCNC: 614 PG/ML (ref 239–931)
WBC NRBC COR # BLD: 5.84 10*3/MM3 (ref 3.2–9.8)

## 2018-09-12 PROCEDURE — 36415 COLL VENOUS BLD VENIPUNCTURE: CPT

## 2018-09-12 PROCEDURE — 80061 LIPID PANEL: CPT

## 2018-09-12 PROCEDURE — 84443 ASSAY THYROID STIM HORMONE: CPT

## 2018-09-12 PROCEDURE — 83036 HEMOGLOBIN GLYCOSYLATED A1C: CPT

## 2018-09-12 PROCEDURE — 83540 ASSAY OF IRON: CPT

## 2018-09-12 PROCEDURE — 83550 IRON BINDING TEST: CPT

## 2018-09-12 PROCEDURE — 85025 COMPLETE CBC W/AUTO DIFF WBC: CPT

## 2018-09-12 PROCEDURE — 82607 VITAMIN B-12: CPT

## 2018-09-12 PROCEDURE — 82746 ASSAY OF FOLIC ACID SERUM: CPT

## 2018-09-12 PROCEDURE — 80053 COMPREHEN METABOLIC PANEL: CPT

## 2018-09-12 PROCEDURE — 82728 ASSAY OF FERRITIN: CPT

## 2018-09-13 ENCOUNTER — TELEPHONE (OUTPATIENT)
Dept: INTERNAL MEDICINE | Facility: CLINIC | Age: 78
End: 2018-09-13

## 2018-09-13 DIAGNOSIS — D64.9 ANEMIA, UNSPECIFIED TYPE: Primary | ICD-10-CM

## 2018-09-13 NOTE — TELEPHONE ENCOUNTER
Pr Dr. Osborn, Ms. Brumfield has been called with her recent lab results & recommendations.  Continue her current medications and follow-up as planned or sooner if any problems.      ----- Message from Graciela Osborn MD sent at 9/12/2018  5:08 PM CDT -----  Glucose ok, prediabetic  Chol ok  Renal function is slightly worse  I refer to  to be on a safe side  Re: avoid any NSAIDs  ----- Message -----  From: Lab, Background User  Sent: 9/12/2018  10:03 AM  To: Graciela Osborn MD

## 2018-09-13 NOTE — PROGRESS NOTES
Pr Dr. Osborn, Ms. Brumfield has been called with her recent lab results & recommendations.  Continue her current medications and follow-up as planned or sooner if any problems.

## 2018-09-13 NOTE — TELEPHONE ENCOUNTER
Pr Dr. Osborn, Ms. Brumfield has been called with her recent lab results & recommendations.  Continue her current medications and follow-up as planned or sooner if any problems.      ----- Message from Graciela Osborn MD sent at 9/13/2018  8:21 AM CDT -----  She is anemic but iron and b12 ok  Cbc needs to be rechecked in 1 month  Also ordered stool for blood  ----- Message -----  From: Lab, Background User  Sent: 9/12/2018  10:03 AM  To: Graciela Osborn MD

## 2018-09-18 ENCOUNTER — TELEPHONE (OUTPATIENT)
Dept: INTERNAL MEDICINE | Facility: CLINIC | Age: 78
End: 2018-09-18

## 2018-09-18 NOTE — TELEPHONE ENCOUNTER
Spoke with pt let her know labs show she is anemic ,but iron and b12 ok ,let her know cbc needs to be rechecked in 1 month this has been ordered as well as stool cards to check if blood in stool she will do these and return to lab as well

## 2018-09-26 ENCOUNTER — TELEPHONE (OUTPATIENT)
Dept: FAMILY MEDICINE CLINIC | Facility: CLINIC | Age: 78
End: 2018-09-26

## 2018-09-26 ENCOUNTER — LAB (OUTPATIENT)
Dept: LAB | Facility: HOSPITAL | Age: 78
End: 2018-09-26

## 2018-09-26 DIAGNOSIS — D64.9 ANEMIA, UNSPECIFIED TYPE: ICD-10-CM

## 2018-09-26 LAB — HEMOCCULT STL QL IA: NEGATIVE

## 2018-09-26 PROCEDURE — 82274 ASSAY TEST FOR BLOOD FECAL: CPT

## 2018-09-26 NOTE — TELEPHONE ENCOUNTER
-Pr Dr. Osborn, Ms. Brumfield has been called with her recent lab results & recommendations.  Continue her current medications and follow-up as planned or sooner if any problems.      ---- Message from Graciela Osborn MD sent at 9/26/2018 11:57 AM CDT -----  ok  ----- Message -----  From: Lab, Background User  Sent: 9/26/2018  11:17 AM  To: Graciela Osborn MD

## 2018-10-10 ENCOUNTER — FLU SHOT (OUTPATIENT)
Dept: FAMILY MEDICINE CLINIC | Facility: CLINIC | Age: 78
End: 2018-10-10

## 2018-10-10 ENCOUNTER — LAB (OUTPATIENT)
Dept: LAB | Facility: HOSPITAL | Age: 78
End: 2018-10-10

## 2018-10-10 ENCOUNTER — TELEPHONE (OUTPATIENT)
Dept: FAMILY MEDICINE CLINIC | Facility: CLINIC | Age: 78
End: 2018-10-10

## 2018-10-10 DIAGNOSIS — N28.9 RENAL INSUFFICIENCY: Primary | ICD-10-CM

## 2018-10-10 DIAGNOSIS — D64.9 ANEMIA, UNSPECIFIED TYPE: ICD-10-CM

## 2018-10-10 LAB
BASOPHILS # BLD AUTO: 0.06 10*3/MM3 (ref 0–0.2)
BASOPHILS NFR BLD AUTO: 0.9 % (ref 0–2)
DEPRECATED RDW RBC AUTO: 42.9 FL (ref 36.4–46.3)
EOSINOPHIL # BLD AUTO: 0.24 10*3/MM3 (ref 0–0.7)
EOSINOPHIL NFR BLD AUTO: 3.6 % (ref 0–7)
ERYTHROCYTE [DISTWIDTH] IN BLOOD BY AUTOMATED COUNT: 12.6 % (ref 11.5–14.5)
HCT VFR BLD AUTO: 32.3 % (ref 35–45)
HGB BLD-MCNC: 10.9 G/DL (ref 12–15.5)
IMM GRANULOCYTES # BLD: 0.01 10*3/MM3 (ref 0–0.02)
IMM GRANULOCYTES NFR BLD: 0.2 % (ref 0–0.5)
LYMPHOCYTES # BLD AUTO: 2.01 10*3/MM3 (ref 0.6–4.2)
LYMPHOCYTES NFR BLD AUTO: 30.2 % (ref 10–50)
MCH RBC QN AUTO: 31.9 PG (ref 26.5–34)
MCHC RBC AUTO-ENTMCNC: 33.7 G/DL (ref 31.4–36)
MCV RBC AUTO: 94.4 FL (ref 80–98)
MONOCYTES # BLD AUTO: 0.71 10*3/MM3 (ref 0–0.9)
MONOCYTES NFR BLD AUTO: 10.7 % (ref 0–12)
NEUTROPHILS # BLD AUTO: 3.63 10*3/MM3 (ref 2–8.6)
NEUTROPHILS NFR BLD AUTO: 54.4 % (ref 37–80)
PLATELET # BLD AUTO: 275 10*3/MM3 (ref 150–450)
PMV BLD AUTO: 10.2 FL (ref 8–12)
RBC # BLD AUTO: 3.42 10*6/MM3 (ref 3.77–5.16)
WBC NRBC COR # BLD: 6.66 10*3/MM3 (ref 3.2–9.8)

## 2018-10-10 PROCEDURE — 90662 IIV NO PRSV INCREASED AG IM: CPT | Performed by: FAMILY MEDICINE

## 2018-10-10 PROCEDURE — G0008 ADMIN INFLUENZA VIRUS VAC: HCPCS | Performed by: FAMILY MEDICINE

## 2018-10-10 PROCEDURE — 85025 COMPLETE CBC W/AUTO DIFF WBC: CPT

## 2018-10-10 PROCEDURE — 36415 COLL VENOUS BLD VENIPUNCTURE: CPT

## 2018-10-10 NOTE — PROGRESS NOTES
Pr Dr. Osborn, Ms. Brumfield has been called with her recent lab results & recommendations.  Continue her current medications and follow-up as planned or sooner if any problems.    She states her appointment with Dr. Upton is not scheduled til Mid November.

## 2018-10-10 NOTE — TELEPHONE ENCOUNTER
Pr Dr. Osborn, Ms. Brumfield has been called with her recent lab results & recommendations.  Continue her current medications and follow-up as planned or sooner if any problems.    She states her appointment with Dr. Upton is not scheduled til Mid November.      ----- Message from Graciela Osborn MD sent at 10/10/2018 11:08 AM CDT -----  Her anemia is about the same probably related to kidney insufficiency  Did she see  for abnormal renal function?      ----- Message -----  From: Lab, Background User  Sent: 10/10/2018  10:24 AM  To: Graciela Osborn MD

## 2018-10-11 ENCOUNTER — TELEPHONE (OUTPATIENT)
Dept: FAMILY MEDICINE CLINIC | Facility: CLINIC | Age: 78
End: 2018-10-11

## 2018-10-11 DIAGNOSIS — N28.9 RENAL INSUFFICIENCY: Primary | ICD-10-CM

## 2018-10-11 NOTE — TELEPHONE ENCOUNTER
-Ms. Brumfield has been called and information relayed to come in and have additional labs drawn tomorrow or early next week.   She states understanding and will probably come in on Monday or Tuesday          ---- Message from Graciela Osborn MD sent at 10/11/2018  9:22 AM CDT -----    I ordered another renal panel to be done Fri or early next week to make sure renal function is stable - tell her to lab fasting      ----- Message -----  From: Hanh Price LPN  Sent: 10/10/2018   4:52 PM  To: Graciela Osborn MD    Pr Dr. Osborn, Ms. Brumfield has been called with her recent lab results & recommendations.  Continue her current medications and follow-up as planned or sooner if any problems.    She states her appointment with Dr. Upton is not scheduled til Mid November.

## 2018-10-12 ENCOUNTER — LAB (OUTPATIENT)
Dept: LAB | Facility: HOSPITAL | Age: 78
End: 2018-10-12

## 2018-10-12 DIAGNOSIS — N28.9 RENAL INSUFFICIENCY: ICD-10-CM

## 2018-10-12 LAB
ALBUMIN SERPL-MCNC: 4.3 G/DL (ref 3.4–4.8)
ANION GAP SERPL CALCULATED.3IONS-SCNC: 13 MMOL/L (ref 5–15)
BUN BLD-MCNC: 26 MG/DL (ref 7–21)
BUN/CREAT SERPL: 21.5 (ref 7–25)
CALCIUM SPEC-SCNC: 9.5 MG/DL (ref 8.4–10.2)
CHLORIDE SERPL-SCNC: 93 MMOL/L (ref 95–110)
CO2 SERPL-SCNC: 25 MMOL/L (ref 22–31)
CREAT BLD-MCNC: 1.21 MG/DL (ref 0.5–1)
GFR SERPL CREATININE-BSD FRML MDRD: 43 ML/MIN/1.73 (ref 39–90)
GLUCOSE BLD-MCNC: 167 MG/DL (ref 60–100)
PHOSPHATE SERPL-MCNC: 3.3 MG/DL (ref 2.4–4.4)
POTASSIUM BLD-SCNC: 4.1 MMOL/L (ref 3.5–5.1)
SODIUM BLD-SCNC: 131 MMOL/L (ref 137–145)

## 2018-10-12 PROCEDURE — 80069 RENAL FUNCTION PANEL: CPT

## 2018-10-12 PROCEDURE — 36415 COLL VENOUS BLD VENIPUNCTURE: CPT

## 2018-10-15 ENCOUNTER — TELEPHONE (OUTPATIENT)
Dept: FAMILY MEDICINE CLINIC | Facility: CLINIC | Age: 78
End: 2018-10-15

## 2018-10-15 NOTE — TELEPHONE ENCOUNTER
Pr Dr. Osborn, Ms. Brumfield has been called with her recent lab results & recommendations.  Continue her current medications and follow-up as planned or sooner if any problems.    She has an appt with Dr. RYLEY Garland this week and will be seeing Nephrology in November      ----- Message from Graciela Osborn MD sent at 10/12/2018  4:09 PM CDT -----  Kidney function is stable  Sodium is a little low  Re: follow up with  for a new consult in November, as scheduled.    ----- Message -----  From: Lab, Background User  Sent: 10/12/2018   3:53 PM  To: Graciela Osborn MD

## 2018-10-15 NOTE — PROGRESS NOTES
Pr Dr. Osborn, Ms. Brumfield has been called with her recent lab results & recommendations.  Continue her current medications and follow-up as planned or sooner if any problems.    She has an appt with Dr. RYLEY Garland this week and will be seeing Nephrology in November

## 2018-10-18 ENCOUNTER — OFFICE VISIT (OUTPATIENT)
Dept: FAMILY MEDICINE CLINIC | Facility: CLINIC | Age: 78
End: 2018-10-18

## 2018-10-18 VITALS
DIASTOLIC BLOOD PRESSURE: 80 MMHG | SYSTOLIC BLOOD PRESSURE: 146 MMHG | BODY MASS INDEX: 27.12 KG/M2 | HEIGHT: 63 IN | WEIGHT: 153.1 LBS

## 2018-10-18 DIAGNOSIS — N28.9 RENAL INSUFFICIENCY: Chronic | ICD-10-CM

## 2018-10-18 DIAGNOSIS — K21.9 GASTROESOPHAGEAL REFLUX DISEASE WITHOUT ESOPHAGITIS: Chronic | ICD-10-CM

## 2018-10-18 DIAGNOSIS — E78.49 OTHER HYPERLIPIDEMIA: Chronic | ICD-10-CM

## 2018-10-18 DIAGNOSIS — I10 ESSENTIAL HYPERTENSION: Primary | Chronic | ICD-10-CM

## 2018-10-18 PROCEDURE — 99214 OFFICE O/P EST MOD 30 MIN: CPT | Performed by: FAMILY MEDICINE

## 2018-10-18 RX ORDER — OMEPRAZOLE 20 MG/1
20 CAPSULE, DELAYED RELEASE ORAL DAILY
Qty: 90 CAPSULE | Refills: 3 | Status: SHIPPED | OUTPATIENT
Start: 2018-10-18 | End: 2019-11-25 | Stop reason: SDUPTHER

## 2018-10-18 RX ORDER — LOSARTAN POTASSIUM AND HYDROCHLOROTHIAZIDE 12.5; 1 MG/1; MG/1
1 TABLET ORAL DAILY
Qty: 90 TABLET | Refills: 3 | Status: SHIPPED | OUTPATIENT
Start: 2018-10-18 | End: 2019-10-17

## 2018-10-18 NOTE — PROGRESS NOTES
Subjective   Lyndsay Brumfield is a 78 y.o. female.     History of Present Illness   follow-up internal medicine history of hypertension hyperlipidemia glaucoma recent to mild elevated creatinine multifactorial.  Feels well no complaints updated on all other.  Last creatinine was 1.2.    The following portions of the patient's history were reviewed and updated as appropriate: allergies, current medications, past family history, past medical history, past social history, past surgical history and problem list.    Review of Systems   Constitutional: Negative for activity change, appetite change, fatigue and unexpected weight change.   HENT: Negative for trouble swallowing and voice change.    Eyes: Negative for redness and visual disturbance.   Respiratory: Negative for cough and wheezing.    Cardiovascular: Negative for chest pain and palpitations.   Gastrointestinal: Negative for abdominal pain, constipation, diarrhea, nausea and vomiting.   Genitourinary: Negative for urgency.   Musculoskeletal: Negative for joint swelling.   Neurological: Negative for syncope and headaches.   Hematological: Negative for adenopathy.   Psychiatric/Behavioral: Negative for sleep disturbance.       Objective   Physical Exam   Constitutional: She is oriented to person, place, and time. She appears well-developed.   HENT:   Head: Normocephalic.   Nose: Nose normal.   Eyes: Pupils are equal, round, and reactive to light.   Neck: Normal range of motion. No thyromegaly present.   Cardiovascular: Normal rate, regular rhythm, normal heart sounds and intact distal pulses.  Exam reveals no gallop and no friction rub.    No murmur heard.  Pulmonary/Chest: Breath sounds normal.   Abdominal: Soft. She exhibits no distension and no mass. There is no tenderness.   Musculoskeletal: Normal range of motion.   Neurological: She is alert and oriented to person, place, and time. She has normal reflexes.   Skin: Skin is warm and dry.   Psychiatric: She has  a normal mood and affect. Her behavior is normal. Thought content normal.       Assessment/Plan   Lyndsay was seen today for establish care.    Diagnoses and all orders for this visit:    Essential hypertension  -     losartan-hydrochlorothiazide (HYZAAR) 100-12.5 MG per tablet; Take 1 tablet by mouth Daily.    Other hyperlipidemia    Renal insufficiency    Gastroesophageal reflux disease without esophagitis  -     omeprazole (priLOSEC) 20 MG capsule; Take 1 capsule by mouth Daily.      Counseled on staying away from anti-inflammatories counseled increasing hydration counseled keeping nephrology appointment on the right deltoid we changed recheck 6 months betime for lab

## 2018-11-20 ENCOUNTER — HOSPITAL ENCOUNTER (OUTPATIENT)
Dept: ULTRASOUND IMAGING | Facility: HOSPITAL | Age: 78
Discharge: HOME | End: 2018-11-20
Admitting: INTERNAL MEDICINE

## 2018-11-20 ENCOUNTER — LAB (OUTPATIENT)
Dept: LAB | Facility: HOSPITAL | Age: 78
End: 2018-11-20

## 2018-11-20 ENCOUNTER — TRANSCRIBE ORDERS (OUTPATIENT)
Dept: LAB | Facility: HOSPITAL | Age: 78
End: 2018-11-20

## 2018-11-20 DIAGNOSIS — N18.30 CHRONIC KIDNEY DISEASE, STAGE III (MODERATE) (HCC): ICD-10-CM

## 2018-11-20 DIAGNOSIS — N18.30 CHRONIC KIDNEY DISEASE, STAGE III (MODERATE) (HCC): Primary | ICD-10-CM

## 2018-11-20 LAB
ALBUMIN SERPL-MCNC: 4.7 G/DL (ref 3.4–4.8)
ANION GAP SERPL CALCULATED.3IONS-SCNC: 13 MMOL/L (ref 5–15)
BUN BLD-MCNC: 20 MG/DL (ref 7–21)
BUN/CREAT SERPL: 20.6 (ref 7–25)
CALCIUM SPEC-SCNC: 9.5 MG/DL (ref 8.4–10.2)
CHLORIDE SERPL-SCNC: 93 MMOL/L (ref 95–110)
CO2 SERPL-SCNC: 27 MMOL/L (ref 22–31)
CREAT BLD-MCNC: 0.97 MG/DL (ref 0.5–1)
CREAT UR-MCNC: 29.3 MG/DL
GFR SERPL CREATININE-BSD FRML MDRD: 55 ML/MIN/1.73 (ref 39–90)
GLUCOSE BLD-MCNC: 138 MG/DL (ref 60–100)
PHOSPHATE SERPL-MCNC: 3.8 MG/DL (ref 2.4–4.4)
POTASSIUM BLD-SCNC: 3.9 MMOL/L (ref 3.5–5.1)
PROT UR-MCNC: 16.3 MG/DL
PROT/CREAT UR: 556.3 MG/G CREA (ref 0–200)
SODIUM BLD-SCNC: 133 MMOL/L (ref 137–145)

## 2018-11-20 PROCEDURE — 80069 RENAL FUNCTION PANEL: CPT

## 2018-11-20 PROCEDURE — 36415 COLL VENOUS BLD VENIPUNCTURE: CPT

## 2018-11-20 PROCEDURE — 84156 ASSAY OF PROTEIN URINE: CPT

## 2018-11-20 PROCEDURE — 82570 ASSAY OF URINE CREATININE: CPT

## 2018-11-20 PROCEDURE — 76775 US EXAM ABDO BACK WALL LIM: CPT

## 2019-01-31 ENCOUNTER — TRANSCRIBE ORDERS (OUTPATIENT)
Dept: LAB | Facility: HOSPITAL | Age: 79
End: 2019-01-31

## 2019-01-31 ENCOUNTER — LAB (OUTPATIENT)
Dept: LAB | Facility: HOSPITAL | Age: 79
End: 2019-01-31

## 2019-01-31 DIAGNOSIS — N18.30 CHRONIC RENAL DISEASE, STAGE III (HCC): Primary | ICD-10-CM

## 2019-01-31 DIAGNOSIS — N18.30 CHRONIC RENAL DISEASE, STAGE III (HCC): ICD-10-CM

## 2019-01-31 LAB
ALBUMIN SERPL-MCNC: 4.3 G/DL (ref 3.4–4.8)
ANION GAP SERPL CALCULATED.3IONS-SCNC: 8 MMOL/L (ref 5–15)
BASOPHILS # BLD AUTO: 0.05 10*3/MM3 (ref 0–0.2)
BASOPHILS NFR BLD AUTO: 0.7 % (ref 0–2)
BUN BLD-MCNC: 29 MG/DL (ref 7–21)
BUN/CREAT SERPL: 23.8 (ref 7–25)
CALCIUM SPEC-SCNC: 9.8 MG/DL (ref 8.4–10.2)
CHLORIDE SERPL-SCNC: 96 MMOL/L (ref 95–110)
CO2 SERPL-SCNC: 28 MMOL/L (ref 22–31)
CREAT BLD-MCNC: 1.22 MG/DL (ref 0.5–1)
DEPRECATED RDW RBC AUTO: 41.8 FL (ref 36.4–46.3)
EOSINOPHIL # BLD AUTO: 0.24 10*3/MM3 (ref 0–0.7)
EOSINOPHIL NFR BLD AUTO: 3.1 % (ref 0–7)
ERYTHROCYTE [DISTWIDTH] IN BLOOD BY AUTOMATED COUNT: 12.3 % (ref 11.5–14.5)
GFR SERPL CREATININE-BSD FRML MDRD: 42 ML/MIN/1.73 (ref 39–90)
GLUCOSE BLD-MCNC: 158 MG/DL (ref 60–100)
HCT VFR BLD AUTO: 32 % (ref 35–45)
HGB BLD-MCNC: 11 G/DL (ref 12–15.5)
IMM GRANULOCYTES # BLD AUTO: 0.01 10*3/MM3 (ref 0–0.02)
IMM GRANULOCYTES NFR BLD AUTO: 0.1 % (ref 0–0.5)
LYMPHOCYTES # BLD AUTO: 2.88 10*3/MM3 (ref 0.6–4.2)
LYMPHOCYTES NFR BLD AUTO: 37.8 % (ref 10–50)
MCH RBC QN AUTO: 32.4 PG (ref 26.5–34)
MCHC RBC AUTO-ENTMCNC: 34.4 G/DL (ref 31.4–36)
MCV RBC AUTO: 94.1 FL (ref 80–98)
MONOCYTES # BLD AUTO: 0.77 10*3/MM3 (ref 0–0.9)
MONOCYTES NFR BLD AUTO: 10.1 % (ref 0–12)
NEUTROPHILS # BLD AUTO: 3.67 10*3/MM3 (ref 2–8.6)
NEUTROPHILS NFR BLD AUTO: 48.2 % (ref 37–80)
PHOSPHATE SERPL-MCNC: 3.7 MG/DL (ref 2.4–4.4)
PLATELET # BLD AUTO: 268 10*3/MM3 (ref 150–450)
PMV BLD AUTO: 9.9 FL (ref 8–12)
POTASSIUM BLD-SCNC: 4.6 MMOL/L (ref 3.5–5.1)
RBC # BLD AUTO: 3.4 10*6/MM3 (ref 3.77–5.16)
SODIUM BLD-SCNC: 132 MMOL/L (ref 137–145)
WBC NRBC COR # BLD: 7.62 10*3/MM3 (ref 3.2–9.8)

## 2019-01-31 PROCEDURE — 83970 ASSAY OF PARATHORMONE: CPT

## 2019-01-31 PROCEDURE — 85025 COMPLETE CBC W/AUTO DIFF WBC: CPT

## 2019-01-31 PROCEDURE — 36415 COLL VENOUS BLD VENIPUNCTURE: CPT

## 2019-01-31 PROCEDURE — 80069 RENAL FUNCTION PANEL: CPT

## 2019-02-01 LAB — PTH-INTACT SERPL-MCNC: 16.2 PG/ML (ref 10–65)

## 2019-02-04 RX ORDER — ASPIRIN 81 MG/1
81 TABLET ORAL DAILY
Qty: 30 TABLET | Refills: 3 | Status: SHIPPED | OUTPATIENT
Start: 2019-02-04 | End: 2019-06-11 | Stop reason: SDUPTHER

## 2019-04-18 ENCOUNTER — OFFICE VISIT (OUTPATIENT)
Dept: FAMILY MEDICINE CLINIC | Facility: CLINIC | Age: 79
End: 2019-04-18

## 2019-04-18 ENCOUNTER — APPOINTMENT (OUTPATIENT)
Dept: LAB | Facility: HOSPITAL | Age: 79
End: 2019-04-18

## 2019-04-18 VITALS
DIASTOLIC BLOOD PRESSURE: 80 MMHG | BODY MASS INDEX: 27.77 KG/M2 | HEIGHT: 63 IN | WEIGHT: 156.7 LBS | SYSTOLIC BLOOD PRESSURE: 132 MMHG

## 2019-04-18 DIAGNOSIS — R73.9 HYPERGLYCEMIA: ICD-10-CM

## 2019-04-18 DIAGNOSIS — M76.32 ILIOTIBIAL BAND SYNDROME, LEFT: ICD-10-CM

## 2019-04-18 DIAGNOSIS — E78.49 OTHER HYPERLIPIDEMIA: Chronic | ICD-10-CM

## 2019-04-18 DIAGNOSIS — M25.552 ACUTE HIP PAIN, LEFT: ICD-10-CM

## 2019-04-18 DIAGNOSIS — I10 ESSENTIAL HYPERTENSION: Primary | Chronic | ICD-10-CM

## 2019-04-18 DIAGNOSIS — N28.9 RENAL INSUFFICIENCY: Chronic | ICD-10-CM

## 2019-04-18 LAB — HBA1C MFR BLD: 6.27 % (ref 4.8–5.6)

## 2019-04-18 PROCEDURE — 83036 HEMOGLOBIN GLYCOSYLATED A1C: CPT | Performed by: FAMILY MEDICINE

## 2019-04-18 PROCEDURE — 36415 COLL VENOUS BLD VENIPUNCTURE: CPT | Performed by: FAMILY MEDICINE

## 2019-04-18 PROCEDURE — 99214 OFFICE O/P EST MOD 30 MIN: CPT | Performed by: FAMILY MEDICINE

## 2019-04-18 RX ORDER — ATORVASTATIN CALCIUM 40 MG/1
40 TABLET, FILM COATED ORAL DAILY
Qty: 90 TABLET | Refills: 3 | Status: SHIPPED | OUTPATIENT
Start: 2019-04-18 | End: 2020-05-27

## 2019-04-18 NOTE — PROGRESS NOTES
Subjective   Lyndsay Brumfield is a 79 y.o. female.     History of Present Illness   reevaluation of mild hypertension mild renal insufficiency hyperlipidemia.  In the interim blood sugars have been creeping upward last hemoglobin A1c above 6 will repeat again today.  Also developed iliotibial band syndrome left over the past several weeks.  Continues on eyedrops continue seeing nephrology.  Is up-to-date on all other.    The following portions of the patient's history were reviewed and updated as appropriate: allergies, current medications, past family history, past medical history, past social history, past surgical history and problem list.    Review of Systems   Constitutional: Negative for activity change, appetite change, fatigue and unexpected weight change.   HENT: Negative for trouble swallowing and voice change.    Eyes: Negative for redness and visual disturbance.   Respiratory: Negative for cough and wheezing.    Cardiovascular: Negative for chest pain and palpitations.   Gastrointestinal: Negative for abdominal pain, constipation, diarrhea, nausea and vomiting.   Genitourinary: Negative for urgency.   Musculoskeletal: Positive for arthralgias and gait problem. Negative for joint swelling.   Neurological: Negative for syncope and headaches.   Hematological: Negative for adenopathy.   Psychiatric/Behavioral: Negative for sleep disturbance.       Objective   Physical Exam   Constitutional: She is oriented to person, place, and time. She appears well-developed.   HENT:   Head: Normocephalic.   Right Ear: External ear normal.   Nose: Nose normal.   Eyes: Pupils are equal, round, and reactive to light.   Neck: Normal range of motion. No thyromegaly present.   Cardiovascular: Normal rate, regular rhythm, normal heart sounds and intact distal pulses. Exam reveals no gallop and no friction rub.   No murmur heard.  Pulmonary/Chest: Breath sounds normal.   Abdominal: Soft. She exhibits no distension and no mass.  There is no tenderness.   Musculoskeletal: Normal range of motion.        Left hip: She exhibits tenderness and bony tenderness.   Point tender left greater trochanter.  Pain to full flexion extension.  Normal range of motion minimal tenderness internal rotation left   Neurological: She is alert and oriented to person, place, and time. She has normal reflexes.   Skin: Skin is warm and dry.   Psychiatric: She has a normal mood and affect. Her speech is normal and behavior is normal. Cognition and memory are normal.       Assessment/Plan   Lyndsay was seen today for hypertension.    Diagnoses and all orders for this visit:    Essential hypertension    Other hyperlipidemia    Renal insufficiency    Hyperglycemia  -     Hemoglobin A1c    Acute hip pain, left  -     XR Hip With or Without Pelvis 2 - 3 View Left  -     Ambulatory Referral to Physical Therapy Evaluate and treat; Strengthening, ROM, Stretching    Iliotibial band syndrome, left  -     XR Hip With or Without Pelvis 2 - 3 View Left  -     Ambulatory Referral to Physical Therapy Evaluate and treat; Strengthening, ROM, Stretching    Other orders  -     atorvastatin (LIPITOR) 40 MG tablet; Take 1 tablet by mouth Daily.      For the hip x-ray to rule out lesion physical therapy trial heat massage etc. repeat lab work today again counseled summertime hydration recheck 6 months based on lab

## 2019-04-23 ENCOUNTER — HOSPITAL ENCOUNTER (OUTPATIENT)
Dept: PHYSICAL THERAPY | Facility: HOSPITAL | Age: 79
Setting detail: THERAPIES SERIES
Discharge: HOME | End: 2019-04-23

## 2019-04-23 DIAGNOSIS — M25.552 LEFT HIP PAIN: ICD-10-CM

## 2019-04-23 DIAGNOSIS — M76.32 ILIOTIBIAL BAND SYNDROME OF LEFT SIDE: Primary | ICD-10-CM

## 2019-04-23 PROCEDURE — 97161 PT EVAL LOW COMPLEX 20 MIN: CPT | Performed by: PHYSICAL THERAPIST

## 2019-04-23 PROCEDURE — 97110 THERAPEUTIC EXERCISES: CPT | Performed by: PHYSICAL THERAPIST

## 2019-04-23 NOTE — THERAPY EVALUATION
Outpatient Physical Therapy Ortho Initial Evaluation  [facility]     Patient Name: Lyndsay Brumfield  : 1940  MRN: [id number]  Today's Date: 2019      Visit Date: 2019      Subjective Evaluation    History of Present Illness  Onset date: 8 months.    Subjective comment: On and off pain hurts to walk after setting for a while. Hip fatigues if walks too much.  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  Quality of life: good    Pain  Current pain ratin  At best pain ratin  At worst pain ratin  Location: side of hip  Quality: dull ache  Aggravating factors: ambulation (sitting)  Progression: no change    Social Support  Lives in: one-story house  Lives with: alone    Hand dominance: right    Diagnostic Tests  X-ray: normal (for age)    Patient Goals  Patient goals for therapy: decreased pain          Patient Active Problem List   Diagnosis   • Essential hypertension   • Primary open angle glaucoma   • Nuclear senile cataract of right eye   • Nuclear senile cataract of right eye   • Hammer toe   • Glaucoma, both eyes   • GERD (gastroesophageal reflux disease)   • Artificial lens present   • Allergic rhinitis   • Other hyperlipidemia   • Renal insufficiency   • Hyperglycemia        Past Medical History:   Diagnosis Date   • Allergic rhinitis    • Artificial lens present     Artificial lens in position - left      • Benign hypertension    • Corns and callus    • Dry cough     most likely due to ACE inhibitor     • Essential hypertension    • GERD (gastroesophageal reflux disease)    • Glaucoma, both eyes    • Hammer toe    • History of Papanicolaou smear of cervix 2004    Atrophy present.   • Malabsorption of glucose    • Nondisplaced fracture of right radial styloid process, initial encounter for closed fracture    • Nuclear senile cataract of right eye    • Nuclear senile cataract of right eye    • Obesity    • Pain in right hand    • Primary open  angle glaucoma     controlled left right IOP increased but OCT stable          Past Surgical History:   Procedure Laterality Date   • BREAST SURGERY  11/22/1974    Cystic disease, right breast. Excision of lesion of right breast.   • ENDOSCOPY AND COLONOSCOPY  02/11/2008    A few diverticula in the sigmoid.   • ESOPHAGOSCOPY / EGD  03/10/2008    Grade II esophagitis of the distal esophagus. Multiple biopsies were obtained from the distal esophagus. The stomach appeared normal. The duodenum appeared normal   • EXTERNAL EAR SURGERY  04/25/2001    Full thickness wedge excision of the left ear, with layered closure   • EYE SURGERY  02/19/2013    Remove cataract, insert lens (1)    left eye    • MASTECTOMY  01/10/1991    Total mastectomy with level I and II node dissection 2   • OTHER SURGICAL HISTORY  03/20/2015    OCT DISC NFL 05273 (2) (Primary open angle glaucoma)      Medications (Admitted on 4/23/2019)    aspirin 81 MG EC tablet    atorvastatin (LIPITOR) 40 MG tablet    Calcium-Magnesium-Vitamin D (CALCIUM 500 PO)    latanoprost (XALATAN) 0.005 % ophthalmic solution    losartan-hydrochlorothiazide (HYZAAR) 100-12.5 MG per tablet    Multiple Vitamins-Minerals (MULTIVITAMIN WITH MINERALS) tablet tablet    omeprazole (priLOSEC) 20 MG capsule    polycarbophil (FIBERCON) 625 MG tablet     ALLERGIES: sulfa antibiotics  Visit Dx:     ICD-10-CM ICD-9-CM   1. Iliotibial band syndrome of left side M76.32 728.89   2. Left hip pain M25.552 719.45           Objective    PT Ortho     Row Name 04/23/19 1000       Precautions and Contraindications    Contraindications  CA Hx Dbl mastectomy `91/`92  -DD       Posture/Observations    Posture/Observations Comments  No apparent distress  -DD       Special Tests/Palpation    Special Tests/Palpation  -- distal IT band TTP, - Cameron test  -DD       General ROM    GENERAL ROM COMMENTS  hip flexion 110, abd 35, ER 40, IR 25.   -DD       MMT (Manual Muscle Testing)    General MMT Comments   5/5 quad and hamstrings/ Hip flexor 4+, Abd 4+  -DD       Sensation    Sensation WNL?  WNL  -DD    Light Touch  No apparent deficits  -DD      User Key  (r) = Recorded By, (t) = Taken By, (c) = Cosigned By    Initials Name Provider Type    Yadira Pike, PT DPT Physical Therapist                      Therapy Education  Given: HEP  Program: New  How Provided: Verbal, Written  Provided to: Patient  Level of Understanding: Demonstrated    Assessment/Plan     PT OP Goals     Row Name 04/23/19 1018          PT Short Term Goals    STG Date to Achieve  05/14/19  -DD     STG 1  Pt independent in HEP  -DD     STG 2  Pt to have min tenderness down IT band.  -DD     STG 3  Pt to MMT 5/5 for hip flexor  -DD     STG 4  Pt to have 5/5 MMT of hip abd.  -DD     STG 5  Pt to report improved function sit to stand with decreased pain  -DD        Long Term Goals    LTG 1  Pt to have LEFS of 55/80  -DD        Time Calculation    PT Goal Re-Cert Due Date  05/14/19  -DD       User Key  (r) = Recorded By, (t) = Taken By, (c) = Cosigned By    Initials Name Provider Type    Yadira Pike, PT DPT Physical Therapist          PT Assessment/Plan     Row Name 04/23/19 1025          PT Assessment    Functional Limitations  Impaired gait;Limitations in functional capacity and performance;Limitations in community activities  -DD     Impairments  Gait;Pain  -DD     Assessment Comments  Hip pain after sedentary time and after walking too much. IT band and hip inflammation present . Would beneift from stretching and strengthening of left hip.  -DD     Please refer to paper survey for additional self-reported information  Yes  -DD     Rehab Potential  Good  -DD     Patient/caregiver participated in establishment of treatment plan and goals  Yes  -DD     Patient would benefit from skilled therapy intervention  Yes  -DD        PT Plan    PT Frequency  2x/week  -DD     Predicted Duration of Therapy Intervention (Therapy Eval)  2  weeks  -DD     Planned CPT's?  PT EVAL LOW COMPLEXITY: 45617;PT MANUAL THERAPY EA 15 MIN: 01933;PT THER PROC EA 15 MIN: 18039  -DD     Physical Therapy Interventions (Optional Details)  strengthening;stretching;manual therapy techniques;home exercise program  -DD     PT Plan Comments  stretching IT band and hip/glut strenghthening. Ice to lateral hip.  -DD       User Key  (r) = Recorded By, (t) = Taken By, (c) = Cosigned By    Initials Name Provider Type    Yadira Pike, PT DPT Physical Therapist            Exercises     Row Name 04/23/19 0900             Exercise 1    Exercise Name 1  SL IT band stretch  -DD      Time 1  3'  -DD      Additional Comments  MFR  -DD         Exercise 2    Exercise Name 2  clam shells  -DD      Reps 2  10  -DD         Exercise 3    Exercise Name 3  Prone TKE  -DD      Reps 3  10  -DD         Exercise 4    Exercise Name 4  SL hip abd  -DD      Reps 4  10  -DD         Exercise 5    Exercise Name 5  MFR  -DD      Time 5  5 mins not stretched  -DD        User Key  (r) = Recorded By, (t) = Taken By, (c) = Cosigned By    Initials Name Provider Type    Yadira Pike, PT DPT Physical Therapist                        Outcome Measure Options: Lower Extremity Functional Scale (LEFS)  Lower Extremity Functional Index  Any of your usual work, housework or school activities: A little bit of difficulty  Your usual hobbies, recreational or sporting activities: No difficulty  Getting into or out of the bath: No difficulty  Walking between rooms: A little bit of difficulty  Putting on your shoes or socks: No difficulty  Squatting: Moderate difficulty  Lifting an object, like a bag of groceries from the floor: No difficulty  Performing light activities around your home: A little bit of difficulty  Performing heavy activities around your home: Moderate difficulty  Getting into or out of a car: A little bit of difficulty  Walking 2 blocks: Moderate difficulty  Walking a mile: Quite  a bit of difficulty  Going up or down 10 stairs (about 1 flight of stairs): Quite a bit of difficulty  Standing for 1 hour: A little bit of difficulty  Sitting for 1 hour: Quite a bit of difficulty  Running on even ground: Quite a bit of difficulty  Running on uneven ground: Extreme difficulty or unable to perform activity  Making sharp turns while running fast: Extreme difficulty or unable to perform activity  Hopping: Moderate difficulty  Rolling over in bed: No difficulty  Total: 47      Time Calculation:     Start Time: 0922  Stop Time: 1003  Time Calculation (min): 41 min  Total Timed Code Minutes- PT: 24 minute(s)     Therapy Charges for Today     Code Description Service Date Service Provider Modifiers Qty    38129428843 HC PT THER PROC EA 15 MIN 4/23/2019 Yadira Cedeño, PT DPT GP 2    82097501454 HC PT EVAL LOW COMPLEXITY 1 4/23/2019 Yadira Cedeño PT DPT GP 1          PT G-Codes  Outcome Measure Options: Lower Extremity Functional Scale (LEFS)  Total: 47         Yadira Cedeño PT EVELYNT  4/23/2019

## 2019-04-29 ENCOUNTER — HOSPITAL ENCOUNTER (OUTPATIENT)
Dept: PHYSICAL THERAPY | Facility: HOSPITAL | Age: 79
Setting detail: THERAPIES SERIES
Discharge: HOME | End: 2019-04-29

## 2019-04-29 DIAGNOSIS — M25.552 LEFT HIP PAIN: ICD-10-CM

## 2019-04-29 DIAGNOSIS — M76.32 ILIOTIBIAL BAND SYNDROME OF LEFT SIDE: Primary | ICD-10-CM

## 2019-04-29 PROCEDURE — 97110 THERAPEUTIC EXERCISES: CPT

## 2019-04-29 NOTE — THERAPY TREATMENT NOTE
"    Outpatient Physical Therapy Ortho Treatment Note  [facility]     Patient Name: Lyndsay Brumfield  : 1940  MRN: [id number]  Today's Date: 2019      Visit Date: 2019  Attendance:  Subjective improvement: \"Much Better\"  Recert: 19  MD Appointment: 10/17/19    Visit Dx:    ICD-10-CM ICD-9-CM   1. Iliotibial band syndrome of left side M76.32 728.89   2. Left hip pain M25.552 719.45       Patient Active Problem List   Diagnosis   • Essential hypertension   • Primary open angle glaucoma   • Nuclear senile cataract of right eye   • Nuclear senile cataract of right eye   • Hammer toe   • Glaucoma, both eyes   • GERD (gastroesophageal reflux disease)   • Artificial lens present   • Allergic rhinitis   • Other hyperlipidemia   • Renal insufficiency   • Hyperglycemia        Past Medical History:   Diagnosis Date   • Allergic rhinitis    • Artificial lens present     Artificial lens in position - left      • Benign hypertension    • Corns and callus    • Dry cough     most likely due to ACE inhibitor     • Essential hypertension    • GERD (gastroesophageal reflux disease)    • Glaucoma, both eyes    • Hammer toe    • History of Papanicolaou smear of cervix 2004    Atrophy present.   • Malabsorption of glucose    • Nondisplaced fracture of right radial styloid process, initial encounter for closed fracture    • Nuclear senile cataract of right eye    • Nuclear senile cataract of right eye    • Obesity    • Pain in right hand    • Primary open angle glaucoma     controlled left right IOP increased but OCT stable          Past Surgical History:   Procedure Laterality Date   • BREAST SURGERY  1974    Cystic disease, right breast. Excision of lesion of right breast.   • ENDOSCOPY AND COLONOSCOPY  2008    A few diverticula in the sigmoid.   • ESOPHAGOSCOPY / EGD  03/10/2008    Grade II esophagitis of the distal esophagus. Multiple biopsies were obtained from the distal " "esophagus. The stomach appeared normal. The duodenum appeared normal   • EXTERNAL EAR SURGERY  04/25/2001    Full thickness wedge excision of the left ear, with layered closure   • EYE SURGERY  02/19/2013    Remove cataract, insert lens (1)    left eye    • MASTECTOMY  01/10/1991    Total mastectomy with level I and II node dissection 2   • OTHER SURGICAL HISTORY  03/20/2015    OCT DISC NFL 31485 (2) (Primary open angle glaucoma)        PT Ortho     Row Name 04/29/19 1000       Precautions and Contraindications    Contraindications  CA Hx Dbl mastectomy `91/`92  (Significant)   -EM       Subjective Pain    Post-Treatment Pain Level  0  -EM       Posture/Observations    Posture/Observations Comments  Amb Ind with no antalgic gt. Min TTP L ITB muscle belly  -EM      User Key  (r) = Recorded By, (t) = Taken By, (c) = Cosigned By    Initials Name Provider Type    Jose Roberto Rangel PTA Physical Therapy Assistant                      PT Assessment/Plan     Row Name 04/29/19 1000          PT Assessment    Functional Limitations  Impaired gait;Limitations in functional capacity and performance;Limitations in community activities  -EM     Impairments  Gait;Pain  -EM     Assessment Comments  Pt mario tx well with progressed therex. Pt compliant with HEP and demo good improvements subjectively.   -EM     Rehab Potential  Good  -EM     Patient/caregiver participated in establishment of treatment plan and goals  Yes  -EM     Patient would benefit from skilled therapy intervention  Yes  -EM        PT Plan    PT Frequency  2x/week  -EM     Predicted Duration of Therapy Intervention (Therapy Eval)  1 wk  -EM     PT Plan Comments  Cont hip strengthening. Add MS next tx.    -EM       User Key  (r) = Recorded By, (t) = Taken By, (c) = Cosigned By    Initials Name Provider Type    Jose Roberto Rangel PTA Physical Therapy Assistant            Exercises     Row Name 04/29/19 1000             Subjective Comments    Subjective Comments  \"I " "dont have much pain anymore. I'm doing much better\"  Pt states she doesnt get stiff/painful after sitting for prolonged times and rarely when going up stairs.   -EM         Subjective Pain    Able to rate subjective pain?  yes  -EM      Pre-Treatment Pain Level  1  -EM      Post-Treatment Pain Level  0  -EM         Exercise 1    Exercise Name 1  PRO II-Course-4.0  -EM      Time 1  10'  -EM         Exercise 2    Exercise Name 2  St Ham S  -EM      Sets 2  3  -EM      Time 2  30\"  -EM         Exercise 3    Exercise Name 3  St ITB S  -EM      Sets 3  3  -EM      Time 3  30\"  -EM         Exercise 4    Exercise Name 4  Seated Hip Flexion  -EM      Sets 4  1  -EM      Reps 4  20  -EM         Exercise 5    Exercise Name 5  Sup SLR   -EM      Sets 5  1  -EM      Reps 5  20  -EM         Exercise 6    Exercise Name 6  SL Hip Abd  -EM      Sets 6  2  -EM      Reps 6  10  -EM         Exercise 7    Exercise Name 7  Sup Piriformis S  -EM      Sets 7  3  -EM      Time 7  30\"  -EM         Exercise 8    Exercise Name 8  Bridges  -EM      Sets 8  1  -EM      Reps 8  20  -EM         Exercise 9    Exercise Name 9  Prone Hip Ext  -EM      Sets 9  1  -EM      Reps 9  20  -EM         Exercise 10    Exercise Name 10  STM  -EM      Time 10  5'  -EM        User Key  (r) = Recorded By, (t) = Taken By, (c) = Cosigned By    Initials Name Provider Type    EM Jose Roberto Castrejon PTA Physical Therapy Assistant                      Manual Rx (last 36 hours)      Manual Treatments     Row Name 04/29/19 0900             Manual Rx 1    Manual Rx 1 Location  L ITB  -EM      Manual Rx 1 Type  STM/ tennis ball roll  -EM      Manual Rx 1 Duration  5'  -EM        User Key  (r) = Recorded By, (t) = Taken By, (c) = Cosigned By    Initials Name Provider Type    EM Jose Roberto Castrejon PTA Physical Therapy Assistant          PT OP Goals     Row Name 04/29/19 1000          PT Short Term Goals    STG Date to Achieve  05/14/19  -EM     STG 1  Pt independent in HEP  -EM  "    STG 1 Progress  Met  (Significant)   -EM     STG 2  Pt to have min tenderness down IT band.  -EM     STG 2 Progress  Met  (Significant)   -EM     STG 3  Pt to MMT 5/5 for hip flexor  -EM     STG 3 Progress  Not Met  -EM     STG 4  Pt to have 5/5 MMT of hip abd.  -EM     STG 4 Progress  Not Met  -EM     STG 5  Pt to report improved function sit to stand with decreased pain  -EM     STG 5 Progress  Not Met  -EM        Long Term Goals    LTG 1  Pt to have LEFS of 55/80  -EM     LTG 1 Progress  Not Met  -EM        Time Calculation    PT Goal Re-Cert Due Date  05/14/19  -EM       User Key  (r) = Recorded By, (t) = Taken By, (c) = Cosigned By    Initials Name Provider Type    EM Jose Roberto Castrejon PTA Physical Therapy Assistant          Therapy Education  Education Details: Updated HEP Issued: St MASTERS S, St Case S  Given: HEP  Program: Progressed  How Provided: Verbal, Demonstration, Written  Provided to: Patient  Level of Understanding: Verbalized, Demonstrated              Time Calculation:   Start Time: 1006  Stop Time: 1055  Time Calculation (min): 49 min  Total Timed Code Minutes- PT: 49 minute(s)  Therapy Charges for Today     Code Description Service Date Service Provider Modifiers Qty    36625331655 HC PT THER PROC EA 15 MIN 4/29/2019 Jose Roberto Castrejon PTA GP 3                    Jose Roberto Castrejon PTA  4/29/2019

## 2019-05-01 ENCOUNTER — HOSPITAL ENCOUNTER (OUTPATIENT)
Dept: PHYSICAL THERAPY | Facility: HOSPITAL | Age: 79
Setting detail: THERAPIES SERIES
Discharge: HOME | End: 2019-05-01

## 2019-05-01 DIAGNOSIS — M25.552 LEFT HIP PAIN: ICD-10-CM

## 2019-05-01 DIAGNOSIS — M76.32 ILIOTIBIAL BAND SYNDROME OF LEFT SIDE: Primary | ICD-10-CM

## 2019-05-01 PROCEDURE — 97110 THERAPEUTIC EXERCISES: CPT

## 2019-05-01 NOTE — THERAPY TREATMENT NOTE
"    Outpatient Physical Therapy Ortho Treatment Note  [facility]     Patient Name: Lyndsay Brumfield  : 1940  MRN: [id number]  Today's Date: 2019      Visit Date: 2019   Attendance:3/3  Subjective improvement:\"Much Better\"  Recert: 19  MD Appointment: 10/17/19        Visit Dx:    ICD-10-CM ICD-9-CM   1. Iliotibial band syndrome of left side M76.32 728.89   2. Left hip pain M25.552 719.45       Patient Active Problem List   Diagnosis   • Essential hypertension   • Primary open angle glaucoma   • Nuclear senile cataract of right eye   • Nuclear senile cataract of right eye   • Hammer toe   • Glaucoma, both eyes   • GERD (gastroesophageal reflux disease)   • Artificial lens present   • Allergic rhinitis   • Other hyperlipidemia   • Renal insufficiency   • Hyperglycemia        Past Medical History:   Diagnosis Date   • Allergic rhinitis    • Artificial lens present     Artificial lens in position - left      • Benign hypertension    • Corns and callus    • Dry cough     most likely due to ACE inhibitor     • Essential hypertension    • GERD (gastroesophageal reflux disease)    • Glaucoma, both eyes    • Hammer toe    • History of Papanicolaou smear of cervix 2004    Atrophy present.   • Malabsorption of glucose    • Nondisplaced fracture of right radial styloid process, initial encounter for closed fracture    • Nuclear senile cataract of right eye    • Nuclear senile cataract of right eye    • Obesity    • Pain in right hand    • Primary open angle glaucoma     controlled left right IOP increased but OCT stable          Past Surgical History:   Procedure Laterality Date   • BREAST SURGERY  1974    Cystic disease, right breast. Excision of lesion of right breast.   • ENDOSCOPY AND COLONOSCOPY  2008    A few diverticula in the sigmoid.   • ESOPHAGOSCOPY / EGD  03/10/2008    Grade II esophagitis of the distal esophagus. Multiple biopsies were obtained from the distal " esophagus. The stomach appeared normal. The duodenum appeared normal   • EXTERNAL EAR SURGERY  04/25/2001    Full thickness wedge excision of the left ear, with layered closure   • EYE SURGERY  02/19/2013    Remove cataract, insert lens (1)    left eye    • MASTECTOMY  01/10/1991    Total mastectomy with level I and II node dissection 2   • OTHER SURGICAL HISTORY  03/20/2015    OCT DISC NFL 23722 (2) (Primary open angle glaucoma)        PT Ortho     Row Name 05/01/19 1000       Precautions and Contraindications    Contraindications  CA Hx Dbl mastectomy `91/`92  (Significant)   -EM       Posture/Observations    Posture/Observations Comments  Amb Ind with no antalgic gt.   -EM    Row Name 04/29/19 1000       Precautions and Contraindications    Contraindications  CA Hx Dbl mastectomy `91/`92  (Significant)   -EM       Subjective Pain    Post-Treatment Pain Level  0  -EM       Posture/Observations    Posture/Observations Comments  Amb Ind with no antalgic gt. Min TTP L ITB muscle belly  -EM      User Key  (r) = Recorded By, (t) = Taken By, (c) = Cosigned By    Initials Name Provider Type    EM Jose Roberto Castrejon, PTA Physical Therapy Assistant                      PT Assessment/Plan     Row Name 05/01/19 1000          PT Assessment    Functional Limitations  Impaired gait;Limitations in functional capacity and performance;Limitations in community activities  -EM     Impairments  Gait;Pain  -EM     Assessment Comments  Pt mario tx well with increased resistance and initiation of CKC therex.  1 goal met this tx.   -EM     Rehab Potential  Good  -EM     Patient/caregiver participated in establishment of treatment plan and goals  Yes  -EM     Patient would benefit from skilled therapy intervention  Yes  -EM        PT Plan    PT Frequency  2x/week  -EM     Predicted Duration of Therapy Intervention (Therapy Eval)  1 wk  -EM     PT Plan Comments  Cont to focus on hip strengthening with progression as mario.   -EM       User Key   "(r) = Recorded By, (t) = Taken By, (c) = Cosigned By    Initials Name Provider Type    EM Jose Roberto Castrejon PTA Physical Therapy Assistant          Modalities     Row Name 05/01/19 1000             Subjective Pain    Post-Treatment Pain Level  0  -EM        User Key  (r) = Recorded By, (t) = Taken By, (c) = Cosigned By    Initials Name Provider Type    EM Jose Roberto Castrejon PTA Physical Therapy Assistant        Exercises     Row Name 05/01/19 1000             Subjective Comments    Subjective Comments  \"I dont have much pain.\" Pt states she was able to spread mulch yesterday  Pt reports ability to perform sit-stand t/f with decreased pain.  -EM         Subjective Pain    Able to rate subjective pain?  yes  -EM      Pre-Treatment Pain Level  1  -EM      Post-Treatment Pain Level  0  -EM         Exercise 1    Exercise Name 1  PRO II-Twin Peak-4.0  -EM      Time 1  10'  -EM         Exercise 2    Exercise Name 2  St Ham S  -EM      Sets 2  3  -EM      Time 2  30\"  -EM         Exercise 3    Exercise Name 3  Seated Piriformis S  -EM      Sets 3  3  -EM      Time 3  30\"   -EM         Exercise 4    Exercise Name 4  St ITB S  -EM      Sets 4  3  -EM      Time 4  30\"  -EM         Exercise 5    Exercise Name 5  St Marches  -EM      Sets 5  1  -EM      Reps 5  20  -EM         Exercise 6    Exercise Name 6  Fwd/Lat Step Up-6\"  -EM      Sets 6  1  -EM      Reps 6  20  -EM         Exercise 7    Exercise Name 7  MS w/ Add ball  -EM      Sets 7  1  -EM      Reps 7  20  -EM         Exercise 8    Exercise Name 8  St 3 Way SLR  -EM      Sets 8  1  -EM      Reps 8  20  -EM         Exercise 9    Exercise Name 9  Bridges  -EM      Sets 9  1  -EM      Reps 9  30  -EM         Exercise 10    Exercise Name 10  SL Clamshells  -EM      Sets 10  1  -EM      Reps 10  30  -EM      Additional Comments  Red  -EM        User Key  (r) = Recorded By, (t) = Taken By, (c) = Cosigned By    Initials Name Provider Type    EM Jose Roberto Castrejon PTA Physical Therapy " Assistant                       PT OP Goals     Row Name 05/01/19 1000          PT Short Term Goals    STG Date to Achieve  05/14/19  -EM     STG 1  Pt independent in HEP  -EM     STG 1 Progress  Met  (Significant)   -EM     STG 2  Pt to have min tenderness down IT band.  -EM     STG 2 Progress  Met  (Significant)   -EM     STG 3  Pt to MMT 5/5 for hip flexor  -EM     STG 3 Progress  Not Met  -EM     STG 4  Pt to have 5/5 MMT of hip abd.  -EM     STG 4 Progress  Not Met  -EM     STG 5  Pt to report improved function sit to stand with decreased pain  -EM     STG 5 Progress  Met  (Significant)   -EM        Long Term Goals    LTG 1  Pt to have LEFS of 55/80  -EM     LTG 1 Progress  Not Met  -EM        Time Calculation    PT Goal Re-Cert Due Date  05/14/19  -EM       User Key  (r) = Recorded By, (t) = Taken By, (c) = Cosigned By    Initials Name Provider Type    EM Jose Roberto Castrejon PTA Physical Therapy Assistant          Therapy Education  Education Details: Red TB issued for HEP.  Given: HEP  Program: Progressed  How Provided: Verbal, Demonstration, Written  Provided to: Patient  Level of Understanding: Verbalized, Demonstrated              Time Calculation:   Start Time: 1006  Stop Time: 1054  Time Calculation (min): 48 min  Total Timed Code Minutes- PT: 48 minute(s)  Therapy Charges for Today     Code Description Service Date Service Provider Modifiers Qty    21376101309 HC PT THER PROC EA 15 MIN 5/1/2019 Jose Roberto Castrejon PTA GP 3                    Jose Roberto Castrejon PTA  5/1/2019

## 2019-05-02 ENCOUNTER — TRANSCRIBE ORDERS (OUTPATIENT)
Dept: LAB | Facility: HOSPITAL | Age: 79
End: 2019-05-02

## 2019-05-02 DIAGNOSIS — I10 ESSENTIAL (PRIMARY) HYPERTENSION: Primary | ICD-10-CM

## 2019-05-03 ENCOUNTER — APPOINTMENT (OUTPATIENT)
Dept: LAB | Facility: HOSPITAL | Age: 79
End: 2019-05-03

## 2019-05-03 PROCEDURE — 80048 BASIC METABOLIC PNL TOTAL CA: CPT | Performed by: INTERNAL MEDICINE

## 2019-05-03 PROCEDURE — 36415 COLL VENOUS BLD VENIPUNCTURE: CPT | Performed by: INTERNAL MEDICINE

## 2019-05-04 LAB
ANION GAP SERPL CALCULATED.3IONS-SCNC: 13.3 MMOL/L
BUN BLD-MCNC: 43 MG/DL (ref 8–23)
BUN/CREAT SERPL: 25.7 (ref 7–25)
CALCIUM SPEC-SCNC: 9.6 MG/DL (ref 8.6–10.5)
CHLORIDE SERPL-SCNC: 99 MMOL/L (ref 98–107)
CO2 SERPL-SCNC: 22.7 MMOL/L (ref 22–29)
CREAT BLD-MCNC: 1.67 MG/DL (ref 0.57–1)
GFR SERPL CREATININE-BSD FRML MDRD: 29 ML/MIN/1.73
GLUCOSE BLD-MCNC: 95 MG/DL (ref 65–99)
POTASSIUM BLD-SCNC: 4.9 MMOL/L (ref 3.5–5.2)
SODIUM BLD-SCNC: 135 MMOL/L (ref 136–145)

## 2019-05-06 ENCOUNTER — HOSPITAL ENCOUNTER (OUTPATIENT)
Dept: PHYSICAL THERAPY | Facility: HOSPITAL | Age: 79
Setting detail: THERAPIES SERIES
Discharge: HOME | End: 2019-05-06

## 2019-05-06 DIAGNOSIS — M76.32 ILIOTIBIAL BAND SYNDROME OF LEFT SIDE: Primary | ICD-10-CM

## 2019-05-06 DIAGNOSIS — M25.552 LEFT HIP PAIN: ICD-10-CM

## 2019-05-06 PROCEDURE — 97110 THERAPEUTIC EXERCISES: CPT

## 2019-05-06 NOTE — THERAPY TREATMENT NOTE
Outpatient Physical Therapy Ortho Treatment Note  [facility]     Patient Name: Lyndsay Brumfield  : 1940  MRN: [id number]  Today's Date: 2019      Visit Date: 2019   Attendance:   Subjective improvement: 75-80%  Recert:19  MD Appointment: 10/17/19      Visit Dx:    ICD-10-CM ICD-9-CM   1. Iliotibial band syndrome of left side M76.32 728.89   2. Left hip pain M25.552 719.45       Patient Active Problem List   Diagnosis   • Essential hypertension   • Primary open angle glaucoma   • Nuclear senile cataract of right eye   • Nuclear senile cataract of right eye   • Hammer toe   • Glaucoma, both eyes   • GERD (gastroesophageal reflux disease)   • Artificial lens present   • Allergic rhinitis   • Other hyperlipidemia   • Renal insufficiency   • Hyperglycemia        Past Medical History:   Diagnosis Date   • Allergic rhinitis    • Artificial lens present     Artificial lens in position - left      • Benign hypertension    • Corns and callus    • Dry cough     most likely due to ACE inhibitor     • Essential hypertension    • GERD (gastroesophageal reflux disease)    • Glaucoma, both eyes    • Hammer toe    • History of Papanicolaou smear of cervix 2004    Atrophy present.   • Malabsorption of glucose    • Nondisplaced fracture of right radial styloid process, initial encounter for closed fracture    • Nuclear senile cataract of right eye    • Nuclear senile cataract of right eye    • Obesity    • Pain in right hand    • Primary open angle glaucoma     controlled left right IOP increased but OCT stable          Past Surgical History:   Procedure Laterality Date   • BREAST SURGERY  1974    Cystic disease, right breast. Excision of lesion of right breast.   • ENDOSCOPY AND COLONOSCOPY  2008    A few diverticula in the sigmoid.   • ESOPHAGOSCOPY / EGD  03/10/2008    Grade II esophagitis of the distal esophagus. Multiple biopsies were obtained from the distal esophagus.  The stomach appeared normal. The duodenum appeared normal   • EXTERNAL EAR SURGERY  04/25/2001    Full thickness wedge excision of the left ear, with layered closure   • EYE SURGERY  02/19/2013    Remove cataract, insert lens (1)    left eye    • MASTECTOMY  01/10/1991    Total mastectomy with level I and II node dissection 2   • OTHER SURGICAL HISTORY  03/20/2015    OCT DISC NFL 34990 (2) (Primary open angle glaucoma)        PT Ortho     Row Name 05/06/19 1100       Precautions and Contraindications    Contraindications  CA Hx Dbl mastectomy `91/`92  (Significant)   -EM       Posture/Observations    Posture/Observations Comments  Amb Ind with no antalgic gt.   -EM      User Key  (r) = Recorded By, (t) = Taken By, (c) = Cosigned By    Initials Name Provider Type    Jose Roberto Rangel PTA Physical Therapy Assistant                      PT Assessment/Plan     Row Name 05/06/19 1100          PT Assessment    Functional Limitations  Impaired gait;Limitations in functional capacity and performance;Limitations in community activities  -EM     Impairments  Gait;Pain  -EM     Rehab Potential  Good  -EM     Patient/caregiver participated in establishment of treatment plan and goals  Yes  -EM     Patient would benefit from skilled therapy intervention  Yes  -EM        PT Plan    PT Frequency  2x/week  -EM     Predicted Duration of Therapy Intervention (Therapy Eval)  1 wks  -EM     PT Plan Comments  Plan D/C to Ind HEP next tx.   -EM       User Key  (r) = Recorded By, (t) = Taken By, (c) = Cosigned By    Initials Name Provider Type    Jose Roberto Rangel PTA Physical Therapy Assistant          Modalities     Row Name 05/06/19 1100             Subjective Pain    Post-Treatment Pain Level  0  -EM        User Key  (r) = Recorded By, (t) = Taken By, (c) = Cosigned By    Initials Name Provider Type    Jose Roberto Rangel PTA Physical Therapy Assistant        Exercises     Row Name 05/06/19 1100             Subjective Comments     "Subjective Comments  Pt reports no new changes since last tx, states she cont to do better.  -EM         Subjective Pain    Able to rate subjective pain?  yes  -EM      Pre-Treatment Pain Level  0  -EM      Post-Treatment Pain Level  0  -EM         Exercise 1    Exercise Name 1  PRO II-Twin Peak-4.0  -EM      Time 1  10'  -EM         Exercise 2    Exercise Name 2  St Ham S  -EM      Sets 2  3  -EM      Time 2  30\"  -EM         Exercise 3    Exercise Name 3  Seated Piriformis S  -EM      Sets 3  3  -EM      Time 3  30\"   -EM         Exercise 4    Exercise Name 4  St ITB S  -EM      Sets 4  3  -EM      Time 4  30\"  -EM         Exercise 5    Exercise Name 5  St Marches  -EM      Sets 5  1  -EM      Reps 5  30  -EM         Exercise 6    Exercise Name 6  Fwd/Lat Step Up-6\"  -EM      Sets 6  1  -EM      Reps 6  20  -EM         Exercise 7    Exercise Name 7  MS w/ Add ball  -EM      Sets 7  1  -EM      Reps 7  20  -EM         Exercise 8    Exercise Name 8  St 3 Way SLR  -EM      Sets 8  1  -EM      Reps 8  30  -EM         Exercise 9    Exercise Name 9  B St Hip Hikes  -EM      Sets 9  1  -EM      Reps 9  30  -EM         Exercise 10    Exercise Name 10  SL Clamshells w/ TB  -EM      Sets 10  1  -EM      Reps 10  30  -EM      Additional Comments  Red  -EM        User Key  (r) = Recorded By, (t) = Taken By, (c) = Cosigned By    Initials Name Provider Type    EM Jose Roberto Castrejon, PTA Physical Therapy Assistant                       PT OP Goals     Row Name 05/06/19 1100          PT Short Term Goals    STG Date to Achieve  05/14/19  -EM     STG 1  Pt independent in HEP  -EM     STG 1 Progress  Met  (Significant)   -EM     STG 2  Pt to have min tenderness down IT band.  -EM     STG 2 Progress  Met  (Significant)   -EM     STG 3  Pt to MMT 5/5 for hip flexor  -EM     STG 3 Progress  Not Met  -EM     STG 4  Pt to have 5/5 MMT of hip abd.  -EM     STG 4 Progress  Not Met  -EM     STG 5  Pt to report improved function sit to stand " with decreased pain  -EM     STG 5 Progress  Met  (Significant)   -EM        Long Term Goals    LTG 1  Pt to have LEFS of 55/80  -EM     LTG 1 Progress  Not Met  -EM        Time Calculation    PT Goal Re-Cert Due Date  05/14/19  -EM       User Key  (r) = Recorded By, (t) = Taken By, (c) = Cosigned By    Initials Name Provider Type    EM Jose Roberto Castrejon PTA Physical Therapy Assistant          Therapy Education  Education Details: Finalized HEP Issued: St Ham S, St ITB S, Seated Piriformis S, St 3 Way SLR, SLR, St Marches, SL Clamshells, St Hip Hikes, MS, Sup Hip Abd TB, Bridge  Given: HEP  Program: Progressed  How Provided: Verbal, Demonstration, Written  Provided to: Patient  Level of Understanding: Verbalized, Demonstrated              Time Calculation:   Start Time: 1105  Stop Time: 1159  Time Calculation (min): 54 min  Total Timed Code Minutes- PT: 54 minute(s)  Therapy Charges for Today     Code Description Service Date Service Provider Modifiers Qty    03084220159 HC PT THER PROC EA 15 MIN 5/6/2019 Jose Roberto Castrejon PTA GP 4                    Jose Roberto Castrejon PTA  5/6/2019

## 2019-05-08 ENCOUNTER — HOSPITAL ENCOUNTER (OUTPATIENT)
Dept: PHYSICAL THERAPY | Facility: HOSPITAL | Age: 79
Setting detail: THERAPIES SERIES
Discharge: HOME | End: 2019-05-08

## 2019-05-08 DIAGNOSIS — M25.552 LEFT HIP PAIN: ICD-10-CM

## 2019-05-08 DIAGNOSIS — M76.32 ILIOTIBIAL BAND SYNDROME OF LEFT SIDE: Primary | ICD-10-CM

## 2019-05-08 PROCEDURE — 97110 THERAPEUTIC EXERCISES: CPT

## 2019-05-08 NOTE — THERAPY DISCHARGE NOTE
Outpatient Physical Therapy Ortho Treatment Note/Discharge Summary  [facility]     Patient Name: Lyndsay Brumfield  : 1940  MRN: [id number]  Today's Date: 2019      Visit Date: 2019   Attendance:   Subjective improvement: 95%  Recert:19  MD Appointment: 10/17/19      Visit Dx:    ICD-10-CM ICD-9-CM   1. Iliotibial band syndrome of left side M76.32 728.89   2. Left hip pain M25.552 719.45       Patient Active Problem List   Diagnosis   • Essential hypertension   • Primary open angle glaucoma   • Nuclear senile cataract of right eye   • Nuclear senile cataract of right eye   • Hammer toe   • Glaucoma, both eyes   • GERD (gastroesophageal reflux disease)   • Artificial lens present   • Allergic rhinitis   • Other hyperlipidemia   • Renal insufficiency   • Hyperglycemia        Past Medical History:   Diagnosis Date   • Allergic rhinitis    • Artificial lens present     Artificial lens in position - left      • Benign hypertension    • Corns and callus    • Dry cough     most likely due to ACE inhibitor     • Essential hypertension    • GERD (gastroesophageal reflux disease)    • Glaucoma, both eyes    • Hammer toe    • History of Papanicolaou smear of cervix 2004    Atrophy present.   • Malabsorption of glucose    • Nondisplaced fracture of right radial styloid process, initial encounter for closed fracture    • Nuclear senile cataract of right eye    • Nuclear senile cataract of right eye    • Obesity    • Pain in right hand    • Primary open angle glaucoma     controlled left right IOP increased but OCT stable          Past Surgical History:   Procedure Laterality Date   • BREAST SURGERY  1974    Cystic disease, right breast. Excision of lesion of right breast.   • ENDOSCOPY AND COLONOSCOPY  2008    A few diverticula in the sigmoid.   • ESOPHAGOSCOPY / EGD  03/10/2008    Grade II esophagitis of the distal esophagus. Multiple biopsies were obtained from the  distal esophagus. The stomach appeared normal. The duodenum appeared normal   • EXTERNAL EAR SURGERY  04/25/2001    Full thickness wedge excision of the left ear, with layered closure   • EYE SURGERY  02/19/2013    Remove cataract, insert lens (1)    left eye    • MASTECTOMY  01/10/1991    Total mastectomy with level I and II node dissection 2   • OTHER SURGICAL HISTORY  03/20/2015    OCT DISC NFL 24134 (2) (Primary open angle glaucoma)        PT Ortho     Row Name 05/08/19 0900       Subjective Comments    Subjective Comments  Pt reports she is still doing well. Subjectively rates her improvement with 95%  Pt reports compliance with HEP.  -EM       Precautions and Contraindications    Contraindications  CA Hx Dbl mastectomy `91/`92  (Significant)   -EM       Subjective Pain    Able to rate subjective pain?  yes  -EM    Pre-Treatment Pain Level  0  -EM       Posture/Observations    Posture/Observations Comments  Amb Ind with no antalgic gt.   -EM       General ROM    GENERAL ROM COMMENTS  L Hip AROM: Hip Flex: 112, Abd: 25, IR: 40, ER: 33  -EM       MMT (Manual Muscle Testing)    General MMT Comments  L LE MMT: Hip Flex: 4+/5, Hip Abd: 4+/5, Hip Add: 5/5, Quad: 5/5, Ham: 5/5  -EM    Row Name 05/06/19 1100       Precautions and Contraindications    Contraindications  CA Hx Dbl mastectomy `91/`92  (Significant)   -EM       Posture/Observations    Posture/Observations Comments  Amb Ind with no antalgic gt.   -EM      User Key  (r) = Recorded By, (t) = Taken By, (c) = Cosigned By    Initials Name Provider Type    EM Jose Roberto Castrejon, PTA Physical Therapy Assistant                      PT Assessment/Plan     Row Name 05/08/19 0900          PT Assessment    Functional Limitations  Impaired gait;Limitations in functional capacity and performance;Limitations in community activities  -EM     Impairments  Gait;Pain  -EM     Assessment Comments  Pt Ind with HEP. 4/6 goals met  -EM     Rehab Potential  Good  -EM      "Patient/caregiver participated in establishment of treatment plan and goals  Yes  -EM     Patient would benefit from skilled therapy intervention  Yes  -EM        PT Plan    PT Frequency  2x/week  -EM     Predicted Duration of Therapy Intervention (Therapy Eval)  D/C  -EM     PT Plan Comments  D/C to Ind HEP w/ 1 mo FF membership  (Significant)   -EM       User Key  (r) = Recorded By, (t) = Taken By, (c) = Cosigned By    Initials Name Provider Type    EM Jose Roberto Castrejon, CARLOS Physical Therapy Assistant          Modalities     Row Name 05/08/19 0900             Subjective Pain    Post-Treatment Pain Level  0  -EM        User Key  (r) = Recorded By, (t) = Taken By, (c) = Cosigned By    Initials Name Provider Type    Jose Roberto Rangel, CARLOS Physical Therapy Assistant          Exercises     Row Name 05/08/19 0900             Subjective Comments    Subjective Comments  Pt reports she is still doing well. Subjectively rates her improvement with 95%  Pt reports compliance with HEP.  -EM         Subjective Pain    Able to rate subjective pain?  yes  -EM      Pre-Treatment Pain Level  0  -EM      Post-Treatment Pain Level  0  -EM         Exercise 1    Exercise Name 1  PRO II-Peak-5.0  -EM      Time 1  10  -EM         Exercise 2    Exercise Name 2  St Ham S  -EM      Sets 2  3  -EM      Time 2  30\"  -EM         Exercise 3    Exercise Name 3  Seated Piriformis S  -EM      Sets 3  3  -EM      Time 3  30\"   -EM         Exercise 4    Exercise Name 4  St ITB S  -EM      Sets 4  3  -EM      Time 4  30\"  -EM         Exercise 5    Exercise Name 5  Fwd/Lat Step Up-6\"  -EM      Sets 5  1  -EM      Reps 5  30  -EM         Exercise 6    Exercise Name 6  MS  -EM      Sets 6  1  -EM      Reps 6  30  -EM        User Key  (r) = Recorded By, (t) = Taken By, (c) = Cosigned By    Initials Name Provider Type    Jose Roberto Rangel, CARLOS Physical Therapy Assistant                         PT OP Goals     Row Name 05/08/19 0900          PT Short Term " Goals    STG Date to Achieve  05/14/19  -EM     STG 1  Pt independent in HEP  -EM     STG 1 Progress  Met  (Significant)   -EM     STG 2  Pt to have min tenderness down IT band.  -EM     STG 2 Progress  Met  (Significant)   -EM     STG 3  Pt to MMT 5/5 for hip flexor  -EM     STG 3 Progress  Not Met  -EM     STG 4  Pt to have 5/5 MMT of hip abd.  -EM     STG 4 Progress  Not Met  -EM     STG 5  Pt to report improved function sit to stand with decreased pain  -EM     STG 5 Progress  Met  (Significant)   -EM        Long Term Goals    LTG 1  Pt to have LEFS of 55/80  -EM     LTG 1 Progress  Met  (Significant)   -EM        Time Calculation    PT Goal Re-Cert Due Date  05/14/19  -EM       User Key  (r) = Recorded By, (t) = Taken By, (c) = Cosigned By    Initials Name Provider Type    EM Jose Roberto Castrejon, PTA Physical Therapy Assistant               Outcome Measure Options: Lower Extremity Functional Scale (LEFS)  Lower Extremity Functional Index  Any of your usual work, housework or school activities: No difficulty  Your usual hobbies, recreational or sporting activities: No difficulty  Getting into or out of the bath: No difficulty  Walking between rooms: No difficulty  Putting on your shoes or socks: No difficulty  Squatting: A little bit of difficulty  Lifting an object, like a bag of groceries from the floor: No difficulty  Performing light activities around your home: No difficulty  Performing heavy activities around your home: A little bit of difficulty  Getting into or out of a car: No difficulty  Walking 2 blocks: No difficulty  Walking a mile: A little bit of difficulty  Going up or down 10 stairs (about 1 flight of stairs): A little bit of difficulty  Standing for 1 hour: No difficulty  Sitting for 1 hour: No difficulty  Running on even ground: A little bit of difficulty  Running on uneven ground: A little bit of difficulty  Making sharp turns while running fast: Moderate difficulty  Hopping: Moderate  difficulty  Rolling over in bed: No difficulty  Total: 70      Time Calculation:   Start Time: 0903  Stop Time: 1000  Time Calculation (min): 57 min  Total Timed Code Minutes- PT: 57 minute(s)  Therapy Charges for Today     Code Description Service Date Service Provider Modifiers Qty    10038993861 HC PT THER PROC EA 15 MIN 5/8/2019 Jose Roberto Castrejon, PTA GP 4          PT G-Codes  Outcome Measure Options: Lower Extremity Functional Scale (LEFS)  Total: 70     OP PT Discharge Summary  Date of Discharge: 05/08/19  Reason for Discharge: Independent  Outcomes Achieved: Patient able to partially acheive established goals(4/6 goals met)  Discharge Destination: Home with home program  Discharge Instructions/Additional Comments: 1 Mo free FF membership issued.      Jose Roberto Castrejon PTA  5/8/2019

## 2019-05-08 NOTE — THERAPY TREATMENT NOTE
Outpatient Physical Therapy Ortho Treatment Note  [facility]     Patient Name: Lyndsay Brumfield  : 1940  MRN: [id number]  Today's Date: 2019      Visit Date: 2019   Attendance:   Subjective improvement:75-80%  Recert: 19  MD Appointment: 10/17/19      Visit Dx:    ICD-10-CM ICD-9-CM   1. Iliotibial band syndrome of left side M76.32 728.89   2. Left hip pain M25.552 719.45       Patient Active Problem List   Diagnosis   • Essential hypertension   • Primary open angle glaucoma   • Nuclear senile cataract of right eye   • Nuclear senile cataract of right eye   • Hammer toe   • Glaucoma, both eyes   • GERD (gastroesophageal reflux disease)   • Artificial lens present   • Allergic rhinitis   • Other hyperlipidemia   • Renal insufficiency   • Hyperglycemia        Past Medical History:   Diagnosis Date   • Allergic rhinitis    • Artificial lens present     Artificial lens in position - left      • Benign hypertension    • Corns and callus    • Dry cough     most likely due to ACE inhibitor     • Essential hypertension    • GERD (gastroesophageal reflux disease)    • Glaucoma, both eyes    • Hammer toe    • History of Papanicolaou smear of cervix 2004    Atrophy present.   • Malabsorption of glucose    • Nondisplaced fracture of right radial styloid process, initial encounter for closed fracture    • Nuclear senile cataract of right eye    • Nuclear senile cataract of right eye    • Obesity    • Pain in right hand    • Primary open angle glaucoma     controlled left right IOP increased but OCT stable          Past Surgical History:   Procedure Laterality Date   • BREAST SURGERY  1974    Cystic disease, right breast. Excision of lesion of right breast.   • ENDOSCOPY AND COLONOSCOPY  2008    A few diverticula in the sigmoid.   • ESOPHAGOSCOPY / EGD  03/10/2008    Grade II esophagitis of the distal esophagus. Multiple biopsies were obtained from the distal esophagus.  The stomach appeared normal. The duodenum appeared normal   • EXTERNAL EAR SURGERY  04/25/2001    Full thickness wedge excision of the left ear, with layered closure   • EYE SURGERY  02/19/2013    Remove cataract, insert lens (1)    left eye    • MASTECTOMY  01/10/1991    Total mastectomy with level I and II node dissection 2   • OTHER SURGICAL HISTORY  03/20/2015    OCT DISC NFL 88529 (2) (Primary open angle glaucoma)        PT Ortho     Row Name 05/06/19 1100       Precautions and Contraindications    Contraindications  CA Hx Dbl mastectomy `91/`92  (Significant)   -EM       Posture/Observations    Posture/Observations Comments  Amb Ind with no antalgic gt.   -EM      User Key  (r) = Recorded By, (t) = Taken By, (c) = Cosigned By    Initials Name Provider Type    EM Jose Roberto Castrejon PTA Physical Therapy Assistant                                                 Therapy Education  Education Details: Finalized HEP Issued: St Ham S, St ITB S, Seated Piriformis S, St 3 Way SLR, SLR, St Marches, SL Clamshells, St Hip Hikes, MS, Sup Hip Abd TB, Bridge  Given: HEP  Program: Progressed  How Provided: Verbal, Demonstration, Written  Provided to: Patient  Level of Understanding: Verbalized, Demonstrated              Time Calculation:   Start Time: 1105  Stop Time: 1159  Time Calculation (min): 54 min  Total Timed Code Minutes- PT: 54 minute(s)                Jose Roberto Castrejon PTA  5/8/2019

## 2019-05-09 ENCOUNTER — APPOINTMENT (OUTPATIENT)
Dept: LAB | Facility: HOSPITAL | Age: 79
End: 2019-05-09

## 2019-05-09 ENCOUNTER — TRANSCRIBE ORDERS (OUTPATIENT)
Dept: LAB | Facility: HOSPITAL | Age: 79
End: 2019-05-09

## 2019-05-09 DIAGNOSIS — N18.30 CHRONIC KIDNEY DISEASE, STAGE III (MODERATE) (HCC): Primary | ICD-10-CM

## 2019-05-09 LAB
ANION GAP SERPL CALCULATED.3IONS-SCNC: 13.3 MMOL/L
BUN BLD-MCNC: 29 MG/DL (ref 8–23)
BUN/CREAT SERPL: 22.5 (ref 7–25)
CALCIUM SPEC-SCNC: 9.4 MG/DL (ref 8.6–10.5)
CHLORIDE SERPL-SCNC: 97 MMOL/L (ref 98–107)
CO2 SERPL-SCNC: 23.7 MMOL/L (ref 22–29)
CREAT BLD-MCNC: 1.29 MG/DL (ref 0.57–1)
GFR SERPL CREATININE-BSD FRML MDRD: 40 ML/MIN/1.73
GLUCOSE BLD-MCNC: 96 MG/DL (ref 65–99)
POTASSIUM BLD-SCNC: 4.6 MMOL/L (ref 3.5–5.2)
SODIUM BLD-SCNC: 134 MMOL/L (ref 136–145)

## 2019-05-09 PROCEDURE — 80048 BASIC METABOLIC PNL TOTAL CA: CPT | Performed by: INTERNAL MEDICINE

## 2019-05-09 PROCEDURE — 36415 COLL VENOUS BLD VENIPUNCTURE: CPT | Performed by: INTERNAL MEDICINE

## 2019-06-11 RX ORDER — ASPIRIN 81 MG/1
TABLET, DELAYED RELEASE ORAL
Qty: 90 TABLET | Refills: 3 | Status: SHIPPED | OUTPATIENT
Start: 2019-06-11 | End: 2020-08-24

## 2019-08-01 ENCOUNTER — TRANSCRIBE ORDERS (OUTPATIENT)
Dept: LAB | Facility: HOSPITAL | Age: 79
End: 2019-08-01

## 2019-08-01 DIAGNOSIS — N18.30 CHRONIC KIDNEY DISEASE, STAGE III (MODERATE) (HCC): Primary | ICD-10-CM

## 2019-08-02 ENCOUNTER — APPOINTMENT (OUTPATIENT)
Dept: LAB | Facility: HOSPITAL | Age: 79
End: 2019-08-02

## 2019-08-02 LAB
ALBUMIN SERPL-MCNC: 4.1 G/DL (ref 3.5–5.2)
ANION GAP SERPL CALCULATED.3IONS-SCNC: 11.4 MMOL/L (ref 5–15)
BASOPHILS # BLD AUTO: 0.08 10*3/MM3 (ref 0–0.2)
BASOPHILS NFR BLD AUTO: 0.9 % (ref 0–1.5)
BUN BLD-MCNC: 28 MG/DL (ref 8–23)
BUN/CREAT SERPL: 25.2 (ref 7–25)
CALCIUM SPEC-SCNC: 9.7 MG/DL (ref 8.6–10.5)
CHLORIDE SERPL-SCNC: 97 MMOL/L (ref 98–107)
CO2 SERPL-SCNC: 23.6 MMOL/L (ref 22–29)
CREAT BLD-MCNC: 1.11 MG/DL (ref 0.57–1)
CREAT UR-MCNC: 47 MG/DL
DEPRECATED RDW RBC AUTO: 41.6 FL (ref 37–54)
EOSINOPHIL # BLD AUTO: 0.22 10*3/MM3 (ref 0–0.4)
EOSINOPHIL NFR BLD AUTO: 2.5 % (ref 0.3–6.2)
ERYTHROCYTE [DISTWIDTH] IN BLOOD BY AUTOMATED COUNT: 12.1 % (ref 12.3–15.4)
GFR SERPL CREATININE-BSD FRML MDRD: 47 ML/MIN/1.73
GLUCOSE BLD-MCNC: 115 MG/DL (ref 65–99)
HCT VFR BLD AUTO: 32.8 % (ref 34–46.6)
HGB BLD-MCNC: 10.9 G/DL (ref 12–15.9)
IMM GRANULOCYTES # BLD AUTO: 0.03 10*3/MM3 (ref 0–0.05)
IMM GRANULOCYTES NFR BLD AUTO: 0.3 % (ref 0–0.5)
LYMPHOCYTES # BLD AUTO: 2.53 10*3/MM3 (ref 0.7–3.1)
LYMPHOCYTES NFR BLD AUTO: 29 % (ref 19.6–45.3)
MCH RBC QN AUTO: 31.1 PG (ref 26.6–33)
MCHC RBC AUTO-ENTMCNC: 33.2 G/DL (ref 31.5–35.7)
MCV RBC AUTO: 93.7 FL (ref 79–97)
MONOCYTES # BLD AUTO: 0.82 10*3/MM3 (ref 0.1–0.9)
MONOCYTES NFR BLD AUTO: 9.4 % (ref 5–12)
NEUTROPHILS # BLD AUTO: 5.04 10*3/MM3 (ref 1.7–7)
NEUTROPHILS NFR BLD AUTO: 57.9 % (ref 42.7–76)
NRBC BLD AUTO-RTO: 0 /100 WBC (ref 0–0.2)
PHOSPHATE SERPL-MCNC: 3.4 MG/DL (ref 2.5–4.5)
PLATELET # BLD AUTO: 254 10*3/MM3 (ref 140–450)
PMV BLD AUTO: 10.3 FL (ref 6–12)
POTASSIUM BLD-SCNC: 4.4 MMOL/L (ref 3.5–5.2)
PROT UR-MCNC: 6 MG/DL
PROT/CREAT UR: 127.7 MG/G CREA (ref 0–200)
RBC # BLD AUTO: 3.5 10*6/MM3 (ref 3.77–5.28)
SODIUM BLD-SCNC: 132 MMOL/L (ref 136–145)
WBC NRBC COR # BLD: 8.72 10*3/MM3 (ref 3.4–10.8)

## 2019-08-02 PROCEDURE — 84156 ASSAY OF PROTEIN URINE: CPT | Performed by: INTERNAL MEDICINE

## 2019-08-02 PROCEDURE — 85025 COMPLETE CBC W/AUTO DIFF WBC: CPT | Performed by: INTERNAL MEDICINE

## 2019-08-02 PROCEDURE — 82570 ASSAY OF URINE CREATININE: CPT | Performed by: INTERNAL MEDICINE

## 2019-08-02 PROCEDURE — 80069 RENAL FUNCTION PANEL: CPT | Performed by: INTERNAL MEDICINE

## 2019-08-02 PROCEDURE — 36415 COLL VENOUS BLD VENIPUNCTURE: CPT | Performed by: INTERNAL MEDICINE

## 2019-08-19 ENCOUNTER — TRANSCRIBE ORDERS (OUTPATIENT)
Dept: LAB | Facility: HOSPITAL | Age: 79
End: 2019-08-19

## 2019-08-19 ENCOUNTER — LAB (OUTPATIENT)
Dept: LAB | Facility: HOSPITAL | Age: 79
End: 2019-08-19

## 2019-08-19 DIAGNOSIS — E87.1 HYPEROSMOLAR HYPONATREMIA: Primary | ICD-10-CM

## 2019-08-19 DIAGNOSIS — E87.0 HYPEROSMOLAR HYPONATREMIA: ICD-10-CM

## 2019-08-19 DIAGNOSIS — E87.1 HYPEROSMOLAR HYPONATREMIA: ICD-10-CM

## 2019-08-19 DIAGNOSIS — E87.0 HYPEROSMOLAR HYPONATREMIA: Primary | ICD-10-CM

## 2019-08-19 LAB — SODIUM BLD-SCNC: 136 MMOL/L (ref 136–145)

## 2019-08-19 PROCEDURE — 36415 COLL VENOUS BLD VENIPUNCTURE: CPT

## 2019-08-19 PROCEDURE — 84295 ASSAY OF SERUM SODIUM: CPT

## 2019-10-17 ENCOUNTER — APPOINTMENT (OUTPATIENT)
Dept: LAB | Facility: HOSPITAL | Age: 79
End: 2019-10-17

## 2019-10-17 ENCOUNTER — OFFICE VISIT (OUTPATIENT)
Dept: FAMILY MEDICINE CLINIC | Facility: CLINIC | Age: 79
End: 2019-10-17

## 2019-10-17 VITALS
DIASTOLIC BLOOD PRESSURE: 78 MMHG | SYSTOLIC BLOOD PRESSURE: 131 MMHG | BODY MASS INDEX: 28.19 KG/M2 | WEIGHT: 159.1 LBS | HEIGHT: 63 IN

## 2019-10-17 DIAGNOSIS — R73.9 HYPERGLYCEMIA: Chronic | ICD-10-CM

## 2019-10-17 DIAGNOSIS — E78.49 OTHER HYPERLIPIDEMIA: Chronic | ICD-10-CM

## 2019-10-17 DIAGNOSIS — N28.9 RENAL INSUFFICIENCY: Chronic | ICD-10-CM

## 2019-10-17 DIAGNOSIS — I10 ESSENTIAL HYPERTENSION: Primary | Chronic | ICD-10-CM

## 2019-10-17 DIAGNOSIS — Z23 NEED FOR IMMUNIZATION AGAINST INFLUENZA: ICD-10-CM

## 2019-10-17 LAB — HBA1C MFR BLD: 6.2 % (ref 4.8–5.6)

## 2019-10-17 PROCEDURE — 90653 IIV ADJUVANT VACCINE IM: CPT | Performed by: FAMILY MEDICINE

## 2019-10-17 PROCEDURE — 36415 COLL VENOUS BLD VENIPUNCTURE: CPT | Performed by: FAMILY MEDICINE

## 2019-10-17 PROCEDURE — G0008 ADMIN INFLUENZA VIRUS VAC: HCPCS | Performed by: FAMILY MEDICINE

## 2019-10-17 PROCEDURE — 83036 HEMOGLOBIN GLYCOSYLATED A1C: CPT | Performed by: FAMILY MEDICINE

## 2019-10-17 PROCEDURE — 99213 OFFICE O/P EST LOW 20 MIN: CPT | Performed by: FAMILY MEDICINE

## 2019-10-17 RX ORDER — LOSARTAN POTASSIUM AND HYDROCHLOROTHIAZIDE 12.5; 1 MG/1; MG/1
1 TABLET ORAL DAILY
Qty: 90 TABLET | Refills: 3 | Status: SHIPPED | OUTPATIENT
Start: 2019-10-17 | End: 2020-06-08 | Stop reason: ALTCHOICE

## 2019-10-17 NOTE — PROGRESS NOTES
Subjective   Lyndsay Brumfield is a 79 y.o. female.     History of Present Illness   reevaluation hypertension history of hyperglycemia hyperlipidemia.  In interim nephrologist had decreased her lisinopril dose to half but had some mild hypo-natremia resolved.  Last A1c was 6.2 repeat again today.  Creatinine kidney functions have actually returned to normal.  Feels well been exercising.  Does need flu vaccine.  HPI    The following portions of the patient's history were reviewed and updated as appropriate: allergies, current medications, past family history, past medical history, past social history, past surgical history and problem list.    Review of Systems  Review of Systems   Constitutional: Negative for activity change, appetite change, fatigue and unexpected weight change.   HENT: Negative for trouble swallowing and voice change.    Eyes: Negative for redness and visual disturbance.   Respiratory: Negative for cough and wheezing.    Cardiovascular: Negative for chest pain and palpitations.   Gastrointestinal: Negative for abdominal pain, constipation, diarrhea, nausea and vomiting.   Genitourinary: Negative for urgency.   Musculoskeletal: Negative for joint swelling.   Neurological: Negative for syncope and headaches.   Hematological: Negative for adenopathy.   Psychiatric/Behavioral: Negative for sleep disturbance.       Objective   Physical Exam  Physical Exam   Constitutional: She is oriented to person, place, and time. She appears well-developed.   HENT:   Head: Normocephalic.   Nose: Nose normal.   Eyes: Pupils are equal, round, and reactive to light.   Neck: Normal range of motion. No thyromegaly present.   Cardiovascular: Normal rate, regular rhythm, normal heart sounds and intact distal pulses. Exam reveals no gallop and no friction rub.   No murmur heard.  Pulmonary/Chest: Breath sounds normal.   Abdominal: Soft. She exhibits no distension and no mass. There is no tenderness.   Musculoskeletal: Normal  "range of motion.   No no peripheral edema 2+ pulses gait normal   Neurological: She is alert and oriented to person, place, and time. She has normal reflexes.   Skin: Skin is warm and dry.   Psychiatric: She has a normal mood and affect. Her speech is normal and behavior is normal.         Visit Vitals  /78 (BP Location: Left arm, Patient Position: Sitting, Cuff Size: Large Adult)   Ht 160 cm (63\")   Wt 72.2 kg (159 lb 1.6 oz)   LMP  (LMP Unknown) Comment: Postmenopausal   BMI 28.18 kg/m²     Body mass index is 28.18 kg/m².      Assessment/Plan   Lyndsay was seen today for follow-up.    Diagnoses and all orders for this visit:    Essential hypertension  -     losartan-hydrochlorothiazide (HYZAAR) 100-12.5 MG per tablet; Take 1 tablet by mouth Daily. Half tab qd    Other hyperlipidemia    Renal insufficiency    Hyperglycemia  -     Hemoglobin A1c    Need for immunization against influenza  -     Fluzone High Dose =>65Years    Counseled mainly on lifestyle measures summertime wintertime hydration fall prevention following up with all subspecialist repeat A1c today recheck 6 months  "

## 2019-11-25 DIAGNOSIS — K21.9 GASTROESOPHAGEAL REFLUX DISEASE WITHOUT ESOPHAGITIS: Chronic | ICD-10-CM

## 2019-11-25 RX ORDER — OMEPRAZOLE 20 MG/1
CAPSULE, DELAYED RELEASE ORAL
Qty: 90 CAPSULE | Refills: 3 | Status: SHIPPED | OUTPATIENT
Start: 2019-11-25 | End: 2020-11-25

## 2019-12-20 ENCOUNTER — OFFICE VISIT (OUTPATIENT)
Dept: FAMILY MEDICINE CLINIC | Facility: CLINIC | Age: 79
End: 2019-12-20

## 2019-12-20 VITALS
WEIGHT: 158.9 LBS | BODY MASS INDEX: 27.13 KG/M2 | SYSTOLIC BLOOD PRESSURE: 141 MMHG | HEIGHT: 64 IN | DIASTOLIC BLOOD PRESSURE: 78 MMHG

## 2019-12-20 DIAGNOSIS — R11.0 NAUSEA: ICD-10-CM

## 2019-12-20 DIAGNOSIS — J40 BRONCHITIS: ICD-10-CM

## 2019-12-20 DIAGNOSIS — R30.0 DYSURIA: ICD-10-CM

## 2019-12-20 DIAGNOSIS — R05.9 COUGH: Primary | ICD-10-CM

## 2019-12-20 DIAGNOSIS — J06.9 ACUTE URI: ICD-10-CM

## 2019-12-20 PROCEDURE — 99214 OFFICE O/P EST MOD 30 MIN: CPT | Performed by: FAMILY MEDICINE

## 2019-12-20 RX ORDER — DOXYCYCLINE HYCLATE 100 MG/1
CAPSULE ORAL
Qty: 14 CAPSULE | Refills: 0 | Status: SHIPPED | OUTPATIENT
Start: 2019-12-20 | End: 2020-06-08

## 2019-12-20 RX ORDER — ALBUTEROL SULFATE 90 UG/1
2 AEROSOL, METERED RESPIRATORY (INHALATION) EVERY 4 HOURS PRN
Qty: 1 INHALER | Refills: 2 | Status: SHIPPED | OUTPATIENT
Start: 2019-12-20 | End: 2021-05-27

## 2019-12-20 RX ORDER — PROMETHAZINE HYDROCHLORIDE 12.5 MG/1
12.5 TABLET ORAL EVERY 6 HOURS PRN
Qty: 15 TABLET | Refills: 1 | Status: SHIPPED | OUTPATIENT
Start: 2019-12-20 | End: 2021-05-27

## 2019-12-20 NOTE — PROGRESS NOTES
Subjective   Lyndsay Brumfield is a 79 y.o. female.     History of Present Illness   requesting evaluation weeklong history of cough congestion coryza low-grade fever off and on but nothing really documented.  Some nausea anorexia.  Seen earlier as mentioned.  Placed on cough medicine.  Has had a flu vaccine.  Developed some dysuria today but no no other new symptoms.  History reviewed.  HPI    The following portions of the patient's history were reviewed and updated as appropriate: allergies, current medications, past family history, past medical history, past social history, past surgical history and problem list.    Review of Systems  Review of Systems   Constitutional: Positive for appetite change. Negative for activity change, fatigue and unexpected weight change.   HENT: Negative for trouble swallowing and voice change.    Eyes: Negative for redness and visual disturbance.   Respiratory: Positive for cough and wheezing.    Cardiovascular: Negative for chest pain and palpitations.   Gastrointestinal: Positive for nausea. Negative for abdominal pain, constipation, diarrhea and vomiting.   Genitourinary: Positive for dysuria. Negative for urgency.   Musculoskeletal: Negative for joint swelling.   Neurological: Negative for syncope and headaches.   Hematological: Negative for adenopathy.   Psychiatric/Behavioral: Negative for sleep disturbance.       Objective   Physical Exam  Physical Exam   Constitutional: She is oriented to person, place, and time. She appears well-developed.   HENT:   Head: Normocephalic.   Nose: Nose normal.   Eyes: Pupils are equal, round, and reactive to light.   Neck: Normal range of motion. No thyromegaly present.   Cardiovascular: Normal rate, regular rhythm, normal heart sounds and intact distal pulses. Exam reveals no gallop and no friction rub.   No murmur heard.  Pulmonary/Chest: She has rhonchi in the right middle field, the right lower field, the left middle field and the left lower  "field.   Mild expiratory rhonchi mid fields.  Normal rate.  Normal phase.   Abdominal: Soft. She exhibits no distension and no mass. There is no tenderness.   Musculoskeletal: Normal range of motion.   Neurological: She is alert and oriented to person, place, and time. She has normal reflexes.   Skin: Skin is warm and dry.   Psychiatric: She has a normal mood and affect. Her behavior is normal. Judgment and thought content normal.   No distress         Visit Vitals  /78   Ht 162.6 cm (64\")   Wt 72.1 kg (158 lb 14.4 oz)   LMP  (LMP Unknown) Comment: Postmenopausal   BMI 27.28 kg/m²     Body mass index is 27.28 kg/m².      Assessment/Plan   Lyndsay was seen today for cough, no intrest in eating, nausea, urinary frequency, heartburn and difficulty urinating.    Diagnoses and all orders for this visit:    Cough  -     albuterol sulfate  (90 Base) MCG/ACT inhaler; Inhale 2 puffs Every 4 (Four) Hours As Needed for Wheezing.    Dysuria    Acute URI    Bronchitis  -     albuterol sulfate  (90 Base) MCG/ACT inhaler; Inhale 2 puffs Every 4 (Four) Hours As Needed for Wheezing.  -     doxycycline (VIBRAMYCIN) 100 MG capsule; 1 bid x 7 days with food    Nausea  -     promethazine (PHENERGAN) 12.5 MG tablet; Take 1 tablet by mouth Every 6 (Six) Hours As Needed for Nausea or Vomiting (and cough).    Counseled increased hydration vaporizer humidifier action stop cough medicine.  Short-term inhaler short-term medicine for bronchitis should also cover urinary tract.  Symptomatic relief rechecks directed  "

## 2020-01-27 ENCOUNTER — LAB (OUTPATIENT)
Dept: LAB | Facility: HOSPITAL | Age: 80
End: 2020-01-27

## 2020-01-27 ENCOUNTER — TRANSCRIBE ORDERS (OUTPATIENT)
Dept: LAB | Facility: HOSPITAL | Age: 80
End: 2020-01-27

## 2020-01-27 DIAGNOSIS — N18.30 CHRONIC KIDNEY DISEASE, STAGE III (MODERATE) (HCC): Primary | ICD-10-CM

## 2020-01-27 DIAGNOSIS — N18.30 CHRONIC KIDNEY DISEASE, STAGE III (MODERATE) (HCC): ICD-10-CM

## 2020-01-27 LAB
ALBUMIN SERPL-MCNC: 4.3 G/DL (ref 3.5–5.2)
ANION GAP SERPL CALCULATED.3IONS-SCNC: 11.5 MMOL/L (ref 5–15)
BASOPHILS # BLD AUTO: 0.07 10*3/MM3 (ref 0–0.2)
BASOPHILS NFR BLD AUTO: 1.1 % (ref 0–1.5)
BUN BLD-MCNC: 23 MG/DL (ref 8–23)
BUN/CREAT SERPL: 22.8 (ref 7–25)
CALCIUM SPEC-SCNC: 9.6 MG/DL (ref 8.6–10.5)
CHLORIDE SERPL-SCNC: 96 MMOL/L (ref 98–107)
CO2 SERPL-SCNC: 27.5 MMOL/L (ref 22–29)
CREAT BLD-MCNC: 1.01 MG/DL (ref 0.57–1)
CREAT UR-MCNC: 42.9 MG/DL
DEPRECATED RDW RBC AUTO: 41.8 FL (ref 37–54)
EOSINOPHIL # BLD AUTO: 0.22 10*3/MM3 (ref 0–0.4)
EOSINOPHIL NFR BLD AUTO: 3.5 % (ref 0.3–6.2)
ERYTHROCYTE [DISTWIDTH] IN BLOOD BY AUTOMATED COUNT: 12.2 % (ref 12.3–15.4)
GFR SERPL CREATININE-BSD FRML MDRD: 53 ML/MIN/1.73
GLUCOSE BLD-MCNC: 94 MG/DL (ref 65–99)
HCT VFR BLD AUTO: 34.3 % (ref 34–46.6)
HGB BLD-MCNC: 11.9 G/DL (ref 12–15.9)
IMM GRANULOCYTES # BLD AUTO: 0.01 10*3/MM3 (ref 0–0.05)
IMM GRANULOCYTES NFR BLD AUTO: 0.2 % (ref 0–0.5)
LYMPHOCYTES # BLD AUTO: 1.89 10*3/MM3 (ref 0.7–3.1)
LYMPHOCYTES NFR BLD AUTO: 29.7 % (ref 19.6–45.3)
MCH RBC QN AUTO: 32.8 PG (ref 26.6–33)
MCHC RBC AUTO-ENTMCNC: 34.7 G/DL (ref 31.5–35.7)
MCV RBC AUTO: 94.5 FL (ref 79–97)
MONOCYTES # BLD AUTO: 0.84 10*3/MM3 (ref 0.1–0.9)
MONOCYTES NFR BLD AUTO: 13.2 % (ref 5–12)
NEUTROPHILS # BLD AUTO: 3.34 10*3/MM3 (ref 1.7–7)
NEUTROPHILS NFR BLD AUTO: 52.3 % (ref 42.7–76)
NRBC BLD AUTO-RTO: 0 /100 WBC (ref 0–0.2)
PHOSPHATE SERPL-MCNC: 3.3 MG/DL (ref 2.5–4.5)
PLATELET # BLD AUTO: 253 10*3/MM3 (ref 140–450)
PMV BLD AUTO: 10.7 FL (ref 6–12)
POTASSIUM BLD-SCNC: 4.4 MMOL/L (ref 3.5–5.2)
PROT UR-MCNC: 10 MG/DL
PROT/CREAT UR: 233.1 MG/G CREA (ref 0–200)
RBC # BLD AUTO: 3.63 10*6/MM3 (ref 3.77–5.28)
SODIUM BLD-SCNC: 135 MMOL/L (ref 136–145)
WBC NRBC COR # BLD: 6.37 10*3/MM3 (ref 3.4–10.8)

## 2020-01-27 PROCEDURE — 84156 ASSAY OF PROTEIN URINE: CPT

## 2020-01-27 PROCEDURE — 82570 ASSAY OF URINE CREATININE: CPT

## 2020-01-27 PROCEDURE — 85025 COMPLETE CBC W/AUTO DIFF WBC: CPT

## 2020-01-27 PROCEDURE — 36415 COLL VENOUS BLD VENIPUNCTURE: CPT

## 2020-01-27 PROCEDURE — 80069 RENAL FUNCTION PANEL: CPT

## 2020-05-27 RX ORDER — ATORVASTATIN CALCIUM 40 MG/1
TABLET, FILM COATED ORAL
Qty: 90 TABLET | Refills: 2 | Status: SHIPPED | OUTPATIENT
Start: 2020-05-27 | End: 2021-02-22

## 2020-06-08 ENCOUNTER — OFFICE VISIT (OUTPATIENT)
Dept: FAMILY MEDICINE CLINIC | Facility: CLINIC | Age: 80
End: 2020-06-08

## 2020-06-08 VITALS
BODY MASS INDEX: 27.4 KG/M2 | WEIGHT: 160.5 LBS | SYSTOLIC BLOOD PRESSURE: 130 MMHG | DIASTOLIC BLOOD PRESSURE: 76 MMHG | HEIGHT: 64 IN

## 2020-06-08 DIAGNOSIS — Z00.00 MEDICARE ANNUAL WELLNESS VISIT, INITIAL: ICD-10-CM

## 2020-06-08 DIAGNOSIS — R73.9 HYPERGLYCEMIA: Primary | Chronic | ICD-10-CM

## 2020-06-08 DIAGNOSIS — N28.9 RENAL INSUFFICIENCY: Chronic | ICD-10-CM

## 2020-06-08 DIAGNOSIS — I10 ESSENTIAL HYPERTENSION: Chronic | ICD-10-CM

## 2020-06-08 LAB — HBA1C MFR BLD: 6.1 %

## 2020-06-08 PROCEDURE — G0438 PPPS, INITIAL VISIT: HCPCS | Performed by: FAMILY MEDICINE

## 2020-06-08 PROCEDURE — 83036 HEMOGLOBIN GLYCOSYLATED A1C: CPT | Performed by: FAMILY MEDICINE

## 2020-06-08 PROCEDURE — 99213 OFFICE O/P EST LOW 20 MIN: CPT | Performed by: FAMILY MEDICINE

## 2020-06-08 PROCEDURE — 96160 PT-FOCUSED HLTH RISK ASSMT: CPT | Performed by: FAMILY MEDICINE

## 2020-06-08 RX ORDER — HYDROCHLOROTHIAZIDE 12.5 MG/1
12.5 TABLET ORAL DAILY
COMMUNITY
End: 2021-06-30 | Stop reason: HOSPADM

## 2020-06-08 NOTE — PROGRESS NOTES
The ABCs of the Annual Wellness Visit  Initial Medicare Wellness Visit    Chief Complaint   Patient presents with   • Hypertension     6 MONTH REVAL       Subjective   History of Present Illness:  Lyndsay Brumfield is a 80 y.o. female who presents for an Initial Medicare Wellness Visit.    HEALTH RISK ASSESSMENT    Recent Hospitalizations:  No hospitalization(s) within the last year.    Current Medical Providers:  Patient Care Team:  Lawson Garland MD as PCP - General (Family Medicine)    Smoking Status:  Social History     Tobacco Use   Smoking Status Never Smoker   Smokeless Tobacco Never Used       Alcohol Consumption:  Social History     Substance and Sexual Activity   Alcohol Use No       Depression Screen:   PHQ-2/PHQ-9 Depression Screening 6/8/2020   Little interest or pleasure in doing things 0   Feeling down, depressed, or hopeless 1   Total Score 1       Fall Risk Screen:  STEADI Fall Risk Assessment was completed, and patient is at LOW risk for falls.Assessment completed on:6/8/2020    Health Habits and Functional and Cognitive Screening:  No flowsheet data found.      Does the patient have evidence of cognitive impairment? No    Asprin use counseling:Does not need ASA (and currently is not on it)    Age-appropriate Screening Schedule:  Refer to the list below for future screening recommendations based on patient's age, sex and/or medical conditions. Orders for these recommended tests are listed in the plan section. The patient has been provided with a written plan.    Health Maintenance   Topic Date Due   • LIPID PANEL  10/13/2022 (Originally 9/12/2019)   • INFLUENZA VACCINE  08/01/2020   • PNEUMOCOCCAL VACCINE (65+ HIGH RISK)  Completed   • TDAP/TD VACCINES  Discontinued   • ZOSTER VACCINE  Discontinued          The following portions of the patient's history were reviewed and updated as appropriate: allergies, current medications, past family history, past medical history, past social history, past  surgical history and problem list.    Outpatient Medications Prior to Visit   Medication Sig Dispense Refill   • albuterol sulfate  (90 Base) MCG/ACT inhaler Inhale 2 puffs Every 4 (Four) Hours As Needed for Wheezing. 1 inhaler 2   • atorvastatin (LIPITOR) 40 MG tablet TAKE 1 TABLET BY MOUTH EVERY DAY 90 tablet 2   • Calcium-Magnesium-Vitamin D (CALCIUM 500 PO) Take  by mouth.     • dorzolamide-timolol (COSOPT) 22.3-6.8 MG/ML ophthalmic solution INSTILL 1 DROP IN RIGHT EYE TWICE DAILY  6   • HM ASPIRIN EC LOW DOSE 81 MG EC tablet TAKE 1 TABLET EVERY DAY 90 tablet 3   • hydroCHLOROthiazide (HYDRODIURIL) 12.5 MG oral Take 12.5 mg by mouth Daily. 1/2 TABLET DAILY     • latanoprost (XALATAN) 0.005 % ophthalmic solution 1 drop Every Night.     • LOSARTAN POTASSIUM PO Take 100 mg by mouth Daily. 1/2 TABLET DAILY     • Multiple Vitamins-Minerals (MULTIVITAMIN WITH MINERALS) tablet tablet Take 1 tablet by mouth Daily.     • omeprazole (priLOSEC) 20 MG capsule TAKE 1 CAPSULE BY MOUTH EVERY DAY 90 capsule 3   • polycarbophil (FIBERCON) 625 MG tablet Take 625 mg by mouth Daily.     • promethazine (PHENERGAN) 12.5 MG tablet Take 1 tablet by mouth Every 6 (Six) Hours As Needed for Nausea or Vomiting (and cough). 15 tablet 1   • doxycycline (VIBRAMYCIN) 100 MG capsule 1 bid x 7 days with food 14 capsule 0   • losartan-hydrochlorothiazide (HYZAAR) 100-12.5 MG per tablet Take 1 tablet by mouth Daily. Half tab qd 90 tablet 3     No facility-administered medications prior to visit.        Patient Active Problem List   Diagnosis   • Essential hypertension   • Primary open angle glaucoma   • Nuclear senile cataract of right eye   • Nuclear senile cataract of right eye   • Hammer toe   • Glaucoma, both eyes   • GERD (gastroesophageal reflux disease)   • Artificial lens present   • Allergic rhinitis   • Other hyperlipidemia   • Renal insufficiency   • Hyperglycemia       Advanced Care Planning:  ACP discussion was held with the  "patient during this visit. Patient does not have an advance directive, information provided.    Review of Systems    Compared to one year ago, the patient feels her physical health is better.  Compared to one year ago, the patient feels her mental health is the same.    Reviewed chart for potential of high risk medication in the elderly: yes  Reviewed chart for potential of harmful drug interactions in the elderly:yes    Objective         Vitals:    06/08/20 0918   BP: 130/76   Weight: 72.8 kg (160 lb 8 oz)   Height: 162.6 cm (64\")   PainSc: 0-No pain       Body mass index is 27.55 kg/m².  Discussed the patient's BMI with her. The BMI is in the acceptable range.    Physical Exam    Lab Results   Component Value Date    HGBA1C 6.1 06/08/2020        Assessment/Plan   Medicare Risks and Personalized Health Plan  CMS Preventative Services Quick Reference  Advance Directive Discussion  Fall Risk  Infection prevention    The above risks/problems have been discussed with the patient.  Pertinent information has been shared with the patient in the After Visit Summary.  Follow up plans and orders are seen below in the Assessment/Plan Section.    Diagnoses and all orders for this visit:    1. Hyperglycemia (Primary)  -     POC Glycosylated Hemoglobin (Hb A1C)    2. Renal insufficiency    3. Essential hypertension    4. Medicare annual wellness visit, initial      Follow Up:  No follow-ups on file.     An After Visit Summary and PPPS were given to the patient.     The patient does not have a history of falls. I did complete a risk assessment for falls. A plan of care for falls was documented.    Patient is had get up and go test done in the past within normal limits no problem    "

## 2020-06-08 NOTE — PROGRESS NOTES
Subjective   Lyndsay Brumfield is a 80 y.o. female.  Reevaluation hypertension mild renal insufficiency diagnoses as below.  In interim had nephrology change to just plain losartan for blood pressure no diuretic creatinine holding steady at 1.  Has maintained weight and blood pressure.  Has some meant to go with arm she is counseled about.  Is up-to-date on all other.  A1c done today 6.1.  Continues on diet controlled hyperglycemia.    History of Present Illness   HPI    The following portions of the patient's history were reviewed and updated as appropriate: allergies, current medications, past family history, past medical history, past social history, past surgical history and problem list.    Review of Systems  Review of Systems   Constitutional: Negative for activity change, appetite change, fatigue and unexpected weight change.   HENT: Negative for trouble swallowing and voice change.    Eyes: Negative for redness and visual disturbance.   Respiratory: Negative for cough and wheezing.    Cardiovascular: Negative for chest pain and palpitations.   Gastrointestinal: Negative for abdominal pain, constipation, diarrhea, nausea and vomiting.   Genitourinary: Negative for urgency.   Musculoskeletal: Negative for joint swelling.   Skin: Positive for color change.   Neurological: Negative for syncope and headaches.   Hematological: Negative for adenopathy.   Psychiatric/Behavioral: Negative for sleep disturbance.       Objective   Physical Exam  Physical Exam   Constitutional: She is oriented to person, place, and time. She appears well-developed.   HENT:   Head: Normocephalic.   Nose: Nose normal.   Eyes: Pupils are equal, round, and reactive to light.   Neck: Normal range of motion. No thyromegaly present.   Cardiovascular: Normal rate, regular rhythm, normal heart sounds and intact distal pulses. Exam reveals no gallop and no friction rub.   No murmur heard.  Pulmonary/Chest: Breath sounds normal.   Abdominal: Soft.  "She exhibits no distension and no mass. There is no tenderness.   Musculoskeletal: Normal range of motion.   Neurological: She is alert and oriented to person, place, and time. She has normal reflexes.   Skin: Skin is warm and dry.   Undergo forearms 1 small mole no untoward pigmentation   Psychiatric: She has a normal mood and affect. Her speech is normal and behavior is normal.         Visit Vitals  /76   Ht 162.6 cm (64\")   Wt 72.8 kg (160 lb 8 oz)   LMP  (LMP Unknown) Comment: Postmenopausal   BMI 27.55 kg/m²     Body mass index is 27.55 kg/m².      Assessment/Plan   Lyndsay was seen today for hypertension.    Diagnoses and all orders for this visit:    Hyperglycemia  -     POC Glycosylated Hemoglobin (Hb A1C)    Renal insufficiency    Essential hypertension    Medicare annual wellness visit, initial    Counseled on sunblock lifestyle measures discussed continue current medicines recheck 6 months  "

## 2020-08-24 RX ORDER — ASPIRIN 81 MG/1
TABLET, DELAYED RELEASE ORAL
Qty: 90 TABLET | Refills: 2 | Status: SHIPPED | OUTPATIENT
Start: 2020-08-24 | End: 2021-05-28

## 2020-11-25 DIAGNOSIS — K21.9 GASTROESOPHAGEAL REFLUX DISEASE WITHOUT ESOPHAGITIS: Chronic | ICD-10-CM

## 2020-11-25 RX ORDER — OMEPRAZOLE 20 MG/1
CAPSULE, DELAYED RELEASE ORAL
Qty: 90 CAPSULE | Refills: 2 | Status: SHIPPED | OUTPATIENT
Start: 2020-11-25 | End: 2021-05-27 | Stop reason: SDUPTHER

## 2021-01-26 ENCOUNTER — IMMUNIZATION (OUTPATIENT)
Dept: VACCINE CLINIC | Facility: HOSPITAL | Age: 81
End: 2021-01-26

## 2021-01-26 PROCEDURE — 91300 HC SARSCOV02 VAC 30MCG/0.3ML IM: CPT | Performed by: NURSE PRACTITIONER

## 2021-01-26 PROCEDURE — 0001A: CPT | Performed by: NURSE PRACTITIONER

## 2021-01-29 ENCOUNTER — TRANSCRIBE ORDERS (OUTPATIENT)
Dept: LAB | Facility: HOSPITAL | Age: 81
End: 2021-01-29

## 2021-01-29 ENCOUNTER — LAB (OUTPATIENT)
Dept: LAB | Facility: HOSPITAL | Age: 81
End: 2021-01-29

## 2021-01-29 DIAGNOSIS — N18.30 STAGE 3 CHRONIC KIDNEY DISEASE, UNSPECIFIED WHETHER STAGE 3A OR 3B CKD (HCC): Primary | ICD-10-CM

## 2021-01-29 LAB
25(OH)D3 SERPL-MCNC: 50.8 NG/ML (ref 30–100)
ALBUMIN SERPL-MCNC: 4 G/DL (ref 3.5–5.2)
ANION GAP SERPL CALCULATED.3IONS-SCNC: 10.3 MMOL/L (ref 5–15)
BACTERIA UR QL AUTO: ABNORMAL /HPF
BASOPHILS # BLD AUTO: 0.06 10*3/MM3 (ref 0–0.2)
BASOPHILS NFR BLD AUTO: 1 % (ref 0–1.5)
BILIRUB UR QL STRIP: NEGATIVE
BUN SERPL-MCNC: 26 MG/DL (ref 8–23)
BUN/CREAT SERPL: 19.7 (ref 7–25)
CALCIUM SPEC-SCNC: 9.4 MG/DL (ref 8.6–10.5)
CHLORIDE SERPL-SCNC: 101 MMOL/L (ref 98–107)
CLARITY UR: ABNORMAL
CO2 SERPL-SCNC: 24.7 MMOL/L (ref 22–29)
COLOR UR: YELLOW
CREAT SERPL-MCNC: 1.32 MG/DL (ref 0.57–1)
CREAT UR-MCNC: 74.8 MG/DL
DEPRECATED RDW RBC AUTO: 41.4 FL (ref 37–54)
EOSINOPHIL # BLD AUTO: 0.17 10*3/MM3 (ref 0–0.4)
EOSINOPHIL NFR BLD AUTO: 2.7 % (ref 0.3–6.2)
ERYTHROCYTE [DISTWIDTH] IN BLOOD BY AUTOMATED COUNT: 12 % (ref 12.3–15.4)
GFR SERPL CREATININE-BSD FRML MDRD: 39 ML/MIN/1.73
GLUCOSE SERPL-MCNC: 89 MG/DL (ref 65–99)
GLUCOSE UR STRIP-MCNC: NEGATIVE MG/DL
HCT VFR BLD AUTO: 33.7 % (ref 34–46.6)
HGB BLD-MCNC: 11.8 G/DL (ref 12–15.9)
HGB UR QL STRIP.AUTO: ABNORMAL
HYALINE CASTS UR QL AUTO: ABNORMAL /LPF
IMM GRANULOCYTES # BLD AUTO: 0.01 10*3/MM3 (ref 0–0.05)
IMM GRANULOCYTES NFR BLD AUTO: 0.2 % (ref 0–0.5)
KETONES UR QL STRIP: NEGATIVE
LEUKOCYTE ESTERASE UR QL STRIP.AUTO: ABNORMAL
LYMPHOCYTES # BLD AUTO: 1.89 10*3/MM3 (ref 0.7–3.1)
LYMPHOCYTES NFR BLD AUTO: 30.3 % (ref 19.6–45.3)
MCH RBC QN AUTO: 33.5 PG (ref 26.6–33)
MCHC RBC AUTO-ENTMCNC: 35 G/DL (ref 31.5–35.7)
MCV RBC AUTO: 95.7 FL (ref 79–97)
MONOCYTES # BLD AUTO: 0.95 10*3/MM3 (ref 0.1–0.9)
MONOCYTES NFR BLD AUTO: 15.2 % (ref 5–12)
NEUTROPHILS NFR BLD AUTO: 3.15 10*3/MM3 (ref 1.7–7)
NEUTROPHILS NFR BLD AUTO: 50.6 % (ref 42.7–76)
NITRITE UR QL STRIP: NEGATIVE
NRBC BLD AUTO-RTO: 0 /100 WBC (ref 0–0.2)
PH UR STRIP.AUTO: 7 [PH] (ref 5–8)
PHOSPHATE SERPL-MCNC: 3.4 MG/DL (ref 2.5–4.5)
PLATELET # BLD AUTO: 249 10*3/MM3 (ref 140–450)
PMV BLD AUTO: 11.1 FL (ref 6–12)
POTASSIUM SERPL-SCNC: 4.9 MMOL/L (ref 3.5–5.2)
PROT UR QL STRIP: ABNORMAL
PROT UR-MCNC: 21 MG/DL
PROT/CREAT UR: 280.7 MG/G CREA (ref 0–200)
PTH-INTACT SERPL-MCNC: 49.9 PG/ML (ref 15–65)
RBC # BLD AUTO: 3.52 10*6/MM3 (ref 3.77–5.28)
RBC # UR: ABNORMAL /HPF
REF LAB TEST METHOD: ABNORMAL
SODIUM SERPL-SCNC: 136 MMOL/L (ref 136–145)
SP GR UR STRIP: 1.02 (ref 1–1.03)
SQUAMOUS #/AREA URNS HPF: ABNORMAL /HPF
UROBILINOGEN UR QL STRIP: ABNORMAL
WBC # BLD AUTO: 6.23 10*3/MM3 (ref 3.4–10.8)
WBC UR QL AUTO: ABNORMAL /HPF

## 2021-01-29 PROCEDURE — 85025 COMPLETE CBC W/AUTO DIFF WBC: CPT | Performed by: INTERNAL MEDICINE

## 2021-01-29 PROCEDURE — 82306 VITAMIN D 25 HYDROXY: CPT | Performed by: INTERNAL MEDICINE

## 2021-01-29 PROCEDURE — 81001 URINALYSIS AUTO W/SCOPE: CPT | Performed by: INTERNAL MEDICINE

## 2021-01-29 PROCEDURE — 82570 ASSAY OF URINE CREATININE: CPT | Performed by: INTERNAL MEDICINE

## 2021-01-29 PROCEDURE — 80069 RENAL FUNCTION PANEL: CPT | Performed by: INTERNAL MEDICINE

## 2021-01-29 PROCEDURE — 84156 ASSAY OF PROTEIN URINE: CPT | Performed by: INTERNAL MEDICINE

## 2021-01-29 PROCEDURE — 36415 COLL VENOUS BLD VENIPUNCTURE: CPT | Performed by: INTERNAL MEDICINE

## 2021-01-29 PROCEDURE — 83970 ASSAY OF PARATHORMONE: CPT | Performed by: INTERNAL MEDICINE

## 2021-02-16 ENCOUNTER — IMMUNIZATION (OUTPATIENT)
Dept: VACCINE CLINIC | Facility: HOSPITAL | Age: 81
End: 2021-02-16

## 2021-02-16 PROCEDURE — 91300 HC SARSCOV02 VAC 30MCG/0.3ML IM: CPT | Performed by: THORACIC SURGERY (CARDIOTHORACIC VASCULAR SURGERY)

## 2021-02-16 PROCEDURE — 0002A: CPT | Performed by: THORACIC SURGERY (CARDIOTHORACIC VASCULAR SURGERY)

## 2021-02-22 RX ORDER — ATORVASTATIN CALCIUM 40 MG/1
TABLET, FILM COATED ORAL
Qty: 90 TABLET | Refills: 0 | Status: SHIPPED | OUTPATIENT
Start: 2021-02-22 | End: 2021-05-27 | Stop reason: SDUPTHER

## 2021-03-01 ENCOUNTER — TRANSCRIBE ORDERS (OUTPATIENT)
Dept: LAB | Facility: HOSPITAL | Age: 81
End: 2021-03-01

## 2021-03-01 ENCOUNTER — LAB (OUTPATIENT)
Dept: LAB | Facility: HOSPITAL | Age: 81
End: 2021-03-01

## 2021-03-01 DIAGNOSIS — N18.30 STAGE 3 CHRONIC KIDNEY DISEASE, UNSPECIFIED WHETHER STAGE 3A OR 3B CKD (HCC): Primary | ICD-10-CM

## 2021-03-01 LAB
ALBUMIN SERPL-MCNC: 4.2 G/DL (ref 3.5–5.2)
ANION GAP SERPL CALCULATED.3IONS-SCNC: 9.8 MMOL/L (ref 5–15)
BUN SERPL-MCNC: 25 MG/DL (ref 8–23)
BUN/CREAT SERPL: 22.1 (ref 7–25)
CALCIUM SPEC-SCNC: 9 MG/DL (ref 8.6–10.5)
CHLORIDE SERPL-SCNC: 100 MMOL/L (ref 98–107)
CO2 SERPL-SCNC: 25.2 MMOL/L (ref 22–29)
CREAT SERPL-MCNC: 1.13 MG/DL (ref 0.57–1)
GFR SERPL CREATININE-BSD FRML MDRD: 46 ML/MIN/1.73
GLUCOSE SERPL-MCNC: 95 MG/DL (ref 65–99)
PHOSPHATE SERPL-MCNC: 3.1 MG/DL (ref 2.5–4.5)
POTASSIUM SERPL-SCNC: 4.7 MMOL/L (ref 3.5–5.2)
SODIUM SERPL-SCNC: 135 MMOL/L (ref 136–145)

## 2021-03-01 PROCEDURE — 36415 COLL VENOUS BLD VENIPUNCTURE: CPT | Performed by: INTERNAL MEDICINE

## 2021-03-01 PROCEDURE — 80069 RENAL FUNCTION PANEL: CPT | Performed by: INTERNAL MEDICINE

## 2021-05-24 RX ORDER — ATORVASTATIN CALCIUM 40 MG/1
TABLET, FILM COATED ORAL
Qty: 90 TABLET | Refills: 0 | OUTPATIENT
Start: 2021-05-24

## 2021-05-24 RX ORDER — ASPIRIN 81 MG/1
TABLET, DELAYED RELEASE ORAL
Qty: 90 TABLET | Refills: 1 | OUTPATIENT
Start: 2021-05-24

## 2021-05-27 ENCOUNTER — OFFICE VISIT (OUTPATIENT)
Dept: FAMILY MEDICINE CLINIC | Facility: CLINIC | Age: 81
End: 2021-05-27

## 2021-05-27 VITALS
DIASTOLIC BLOOD PRESSURE: 76 MMHG | BODY MASS INDEX: 27.9 KG/M2 | HEIGHT: 64 IN | WEIGHT: 163.4 LBS | SYSTOLIC BLOOD PRESSURE: 126 MMHG

## 2021-05-27 DIAGNOSIS — N28.9 RENAL INSUFFICIENCY: Chronic | ICD-10-CM

## 2021-05-27 DIAGNOSIS — K21.9 GASTROESOPHAGEAL REFLUX DISEASE WITHOUT ESOPHAGITIS: Chronic | ICD-10-CM

## 2021-05-27 DIAGNOSIS — E78.49 OTHER HYPERLIPIDEMIA: Chronic | ICD-10-CM

## 2021-05-27 DIAGNOSIS — L57.0 ACTINIC KERATOSIS: ICD-10-CM

## 2021-05-27 DIAGNOSIS — I10 ESSENTIAL HYPERTENSION: Primary | Chronic | ICD-10-CM

## 2021-05-27 DIAGNOSIS — R73.9 HYPERGLYCEMIA: Chronic | ICD-10-CM

## 2021-05-27 PROCEDURE — 17003 DESTRUCT PREMALG LES 2-14: CPT | Performed by: FAMILY MEDICINE

## 2021-05-27 PROCEDURE — 99214 OFFICE O/P EST MOD 30 MIN: CPT | Performed by: FAMILY MEDICINE

## 2021-05-27 PROCEDURE — 17000 DESTRUCT PREMALG LESION: CPT | Performed by: FAMILY MEDICINE

## 2021-05-27 RX ORDER — ATORVASTATIN CALCIUM 40 MG/1
40 TABLET, FILM COATED ORAL DAILY
Qty: 90 TABLET | Refills: 3 | Status: SHIPPED | OUTPATIENT
Start: 2021-05-27 | End: 2022-05-20

## 2021-05-27 RX ORDER — OMEPRAZOLE 20 MG/1
20 CAPSULE, DELAYED RELEASE ORAL DAILY
Qty: 90 CAPSULE | Refills: 2 | Status: SHIPPED | OUTPATIENT
Start: 2021-05-27 | End: 2022-05-19 | Stop reason: SDUPTHER

## 2021-05-27 NOTE — PROGRESS NOTES
Subjective   Lyndsay Brumfield is a 81 y.o. female.  Reevaluation hypertension renal insufficiency hyperlipidemia history of glaucoma mild hyperglycemia.  Interim creatinine holding steady 1.3 sugars holding steady.  Medicines have remained the same.  Continues to see nephrology.  Is maintain weight and blood pressure.  No falling.  Has a couple skin lesions left wrist needs to be evaluated.  Continues to see ophthalmology.  Overall stable.    History of Present Illness   HPI    The following portions of the patient's history were reviewed and updated as appropriate: allergies, current medications, past family history, past medical history, past social history, past surgical history and problem list.    Review of Systems  Review of Systems   Constitutional: Negative for activity change, appetite change, fatigue and unexpected weight change.   HENT: Negative for trouble swallowing and voice change.    Eyes: Negative for redness and visual disturbance.   Respiratory: Negative for cough and wheezing.    Cardiovascular: Negative for chest pain and palpitations.   Gastrointestinal: Negative for abdominal pain, constipation, diarrhea, nausea and vomiting.   Genitourinary: Negative for urgency.   Musculoskeletal: Negative for joint swelling.   Skin: Positive for color change.   Neurological: Negative for syncope and headaches.   Hematological: Negative for adenopathy.   Psychiatric/Behavioral: Negative for sleep disturbance.       Objective   Physical Exam  Physical Exam  Constitutional:       Appearance: She is well-developed.   HENT:      Head: Normocephalic.   Eyes:      Pupils: Pupils are equal, round, and reactive to light.   Neck:      Thyroid: No thyromegaly.   Cardiovascular:      Rate and Rhythm: Normal rate and regular rhythm.      Heart sounds: Normal heart sounds. No murmur heard.   No friction rub. No gallop.    Pulmonary:      Breath sounds: Normal breath sounds.   Abdominal:      General: There is no  "distension.      Palpations: Abdomen is soft. There is no mass.      Tenderness: There is no abdominal tenderness.   Musculoskeletal:         General: Normal range of motion.      Cervical back: Normal range of motion.      Comments: Get up and go 3 to 5 seconds gait normal   Skin:     General: Skin is warm and dry.      Comments: Left dorsal wrist shows 2 lesions one a slightly elevated keratotic lesion about centimeter in size consistent with an early actinic keratoses versus seborrheic but favor actinic smaller one just distal to that.   Neurological:      Mental Status: She is alert and oriented to person, place, and time.      Deep Tendon Reflexes: Reflexes are normal and symmetric.   Psychiatric:         Attention and Perception: Attention normal.         Mood and Affect: Mood normal.         Speech: Speech normal.         Behavior: Behavior normal.         Thought Content: Thought content normal.         Cognition and Memory: Cognition normal.       Results for orders placed or performed in visit on 03/01/21   Renal Function Panel    Specimen: Blood   Result Value Ref Range    Glucose 95 65 - 99 mg/dL    BUN 25 (H) 8 - 23 mg/dL    Creatinine 1.13 (H) 0.57 - 1.00 mg/dL    Sodium 135 (L) 136 - 145 mmol/L    Potassium 4.7 3.5 - 5.2 mmol/L    Chloride 100 98 - 107 mmol/L    CO2 25.2 22.0 - 29.0 mmol/L    Calcium 9.0 8.6 - 10.5 mg/dL    Albumin 4.20 3.50 - 5.20 g/dL    Phosphorus 3.1 2.5 - 4.5 mg/dL    Anion Gap 9.8 5.0 - 15.0 mmol/L    BUN/Creatinine Ratio 22.1 7.0 - 25.0    eGFR Non African Amer 46 (L) >60 mL/min/1.73         Visit Vitals  /76   Ht 162.6 cm (64\")   Wt 74.1 kg (163 lb 6.4 oz)   LMP  (LMP Unknown) Comment: Postmenopausal   BMI 28.05 kg/m²     Body mass index is 28.05 kg/m².  /76   Ht 162.6 cm (64\")   Wt 74.1 kg (163 lb 6.4 oz)   LMP  (LMP Unknown) Comment: Postmenopausal  BMI 28.05 kg/m²       Assessment/Plan   Diagnoses and all orders for this visit:    1. Essential hypertension " (Primary)    2. Other hyperlipidemia  -     Lipid Panel With LDL / HDL Ratio; Future    3. Hyperglycemia  -     Hemoglobin A1c; Future    4. Renal insufficiency    5. Actinic keratosis    6. Gastroesophageal reflux disease without esophagitis  -     omeprazole (priLOSEC) 20 MG capsule; Take 1 capsule by mouth Daily.  Dispense: 90 capsule; Refill: 2    Other orders  -     atorvastatin (LIPITOR) 40 MG tablet; Take 1 tablet by mouth Daily.  Dispense: 90 tablet; Refill: 3    After informed consent the areas were frozen liquid nodule x230-second freezes  skin care watching for infection and watching for nonclearance rechecks directed otherwise counseled hydration medication fall prevention.  Orders as above recheck 6 months with time for subsequent Medicare

## 2021-05-28 RX ORDER — ASPIRIN 81 MG/1
TABLET, DELAYED RELEASE ORAL
Qty: 90 TABLET | Refills: 1 | Status: SHIPPED | OUTPATIENT
Start: 2021-05-28 | End: 2021-11-22

## 2021-06-26 PROCEDURE — 87086 URINE CULTURE/COLONY COUNT: CPT | Performed by: NURSE PRACTITIONER

## 2021-06-26 PROCEDURE — 87186 SC STD MICRODIL/AGAR DIL: CPT | Performed by: NURSE PRACTITIONER

## 2021-06-28 ENCOUNTER — TELEPHONE (OUTPATIENT)
Dept: URGENT CARE | Facility: CLINIC | Age: 81
End: 2021-06-28

## 2021-06-28 ENCOUNTER — HOSPITAL ENCOUNTER (OUTPATIENT)
Facility: HOSPITAL | Age: 81
Setting detail: OBSERVATION
Discharge: HOME | End: 2021-06-30
Attending: FAMILY MEDICINE | Admitting: FAMILY MEDICINE

## 2021-06-28 DIAGNOSIS — E87.1 HYPONATREMIA: Primary | ICD-10-CM

## 2021-06-28 DIAGNOSIS — N39.0 URINARY TRACT INFECTION WITHOUT HEMATURIA, SITE UNSPECIFIED: ICD-10-CM

## 2021-06-28 PROBLEM — A49.9 UTI (URINARY TRACT INFECTION), BACTERIAL: Status: ACTIVE | Noted: 2021-06-28

## 2021-06-28 LAB
ALBUMIN SERPL-MCNC: 3.8 G/DL (ref 3.5–5.2)
ALBUMIN/GLOB SERPL: 1.2 G/DL
ALP SERPL-CCNC: 104 U/L (ref 39–117)
ALT SERPL W P-5'-P-CCNC: 38 U/L (ref 1–33)
ANION GAP SERPL CALCULATED.3IONS-SCNC: 10 MMOL/L (ref 5–15)
AST SERPL-CCNC: 43 U/L (ref 1–32)
BACTERIA UR QL AUTO: ABNORMAL /HPF
BASOPHILS # BLD AUTO: 0.04 10*3/MM3 (ref 0–0.2)
BASOPHILS NFR BLD AUTO: 0.7 % (ref 0–1.5)
BILIRUB SERPL-MCNC: 0.4 MG/DL (ref 0–1.2)
BILIRUB UR QL STRIP: NEGATIVE
BUN SERPL-MCNC: 23 MG/DL (ref 8–23)
BUN/CREAT SERPL: 20.4 (ref 7–25)
CALCIUM SPEC-SCNC: 9.5 MG/DL (ref 8.6–10.5)
CHLORIDE SERPL-SCNC: 90 MMOL/L (ref 98–107)
CLARITY UR: CLEAR
CO2 SERPL-SCNC: 23 MMOL/L (ref 22–29)
COLOR UR: YELLOW
CREAT SERPL-MCNC: 1.13 MG/DL (ref 0.57–1)
DEPRECATED RDW RBC AUTO: 38 FL (ref 37–54)
EOSINOPHIL # BLD AUTO: 0.16 10*3/MM3 (ref 0–0.4)
EOSINOPHIL NFR BLD AUTO: 2.7 % (ref 0.3–6.2)
ERYTHROCYTE [DISTWIDTH] IN BLOOD BY AUTOMATED COUNT: 11.7 % (ref 12.3–15.4)
FLUAV SUBTYP SPEC NAA+PROBE: NOT DETECTED
FLUBV RNA ISLT QL NAA+PROBE: NOT DETECTED
GFR SERPL CREATININE-BSD FRML MDRD: 46 ML/MIN/1.73
GLOBULIN UR ELPH-MCNC: 3.3 GM/DL
GLUCOSE SERPL-MCNC: 108 MG/DL (ref 65–99)
GLUCOSE UR STRIP-MCNC: NEGATIVE MG/DL
HCT VFR BLD AUTO: 34.8 % (ref 34–46.6)
HGB BLD-MCNC: 12.3 G/DL (ref 12–15.9)
HGB UR QL STRIP.AUTO: NEGATIVE
HOLD SPECIMEN: NORMAL
HYALINE CASTS UR QL AUTO: ABNORMAL /LPF
IMM GRANULOCYTES # BLD AUTO: 0.01 10*3/MM3 (ref 0–0.05)
IMM GRANULOCYTES NFR BLD AUTO: 0.2 % (ref 0–0.5)
KETONES UR QL STRIP: NEGATIVE
LEUKOCYTE ESTERASE UR QL STRIP.AUTO: ABNORMAL
LYMPHOCYTES # BLD AUTO: 2.36 10*3/MM3 (ref 0.7–3.1)
LYMPHOCYTES NFR BLD AUTO: 39.5 % (ref 19.6–45.3)
MCH RBC QN AUTO: 31.8 PG (ref 26.6–33)
MCHC RBC AUTO-ENTMCNC: 35.3 G/DL (ref 31.5–35.7)
MCV RBC AUTO: 89.9 FL (ref 79–97)
MONOCYTES # BLD AUTO: 0.92 10*3/MM3 (ref 0.1–0.9)
MONOCYTES NFR BLD AUTO: 15.4 % (ref 5–12)
NEUTROPHILS NFR BLD AUTO: 2.49 10*3/MM3 (ref 1.7–7)
NEUTROPHILS NFR BLD AUTO: 41.5 % (ref 42.7–76)
NITRITE UR QL STRIP: NEGATIVE
NRBC BLD AUTO-RTO: 0 /100 WBC (ref 0–0.2)
OSMOLALITY UR: 243 MOSM/KG (ref 38–1400)
PH UR STRIP.AUTO: 6.5 [PH] (ref 5–9)
PLATELET # BLD AUTO: 165 10*3/MM3 (ref 140–450)
PMV BLD AUTO: 10.1 FL (ref 6–12)
POTASSIUM SERPL-SCNC: 4.4 MMOL/L (ref 3.5–5.2)
PROT SERPL-MCNC: 7.1 G/DL (ref 6–8.5)
PROT UR QL STRIP: NEGATIVE
RBC # BLD AUTO: 3.87 10*6/MM3 (ref 3.77–5.28)
RBC # UR: ABNORMAL /HPF
REF LAB TEST METHOD: ABNORMAL
SARS-COV-2 RNA PNL SPEC NAA+PROBE: NOT DETECTED
SODIUM SERPL-SCNC: 123 MMOL/L (ref 136–145)
SODIUM UR-SCNC: 38 MMOL/L
SP GR UR STRIP: 1.01 (ref 1–1.03)
SQUAMOUS #/AREA URNS HPF: ABNORMAL /HPF
UROBILINOGEN UR QL STRIP: ABNORMAL
WBC # BLD AUTO: 5.98 10*3/MM3 (ref 3.4–10.8)
WBC UR QL AUTO: ABNORMAL /HPF

## 2021-06-28 PROCEDURE — 85025 COMPLETE CBC W/AUTO DIFF WBC: CPT | Performed by: PHYSICIAN ASSISTANT

## 2021-06-28 PROCEDURE — G0378 HOSPITAL OBSERVATION PER HR: HCPCS

## 2021-06-28 PROCEDURE — 99285 EMERGENCY DEPT VISIT HI MDM: CPT

## 2021-06-28 PROCEDURE — 81001 URINALYSIS AUTO W/SCOPE: CPT | Performed by: PHYSICIAN ASSISTANT

## 2021-06-28 PROCEDURE — 87086 URINE CULTURE/COLONY COUNT: CPT | Performed by: PHYSICIAN ASSISTANT

## 2021-06-28 PROCEDURE — 83935 ASSAY OF URINE OSMOLALITY: CPT | Performed by: FAMILY MEDICINE

## 2021-06-28 PROCEDURE — 25010000002 HEPARIN (PORCINE) PER 1000 UNITS: Performed by: FAMILY MEDICINE

## 2021-06-28 PROCEDURE — 84443 ASSAY THYROID STIM HORMONE: CPT | Performed by: FAMILY MEDICINE

## 2021-06-28 PROCEDURE — 96361 HYDRATE IV INFUSION ADD-ON: CPT

## 2021-06-28 PROCEDURE — 36415 COLL VENOUS BLD VENIPUNCTURE: CPT | Performed by: FAMILY MEDICINE

## 2021-06-28 PROCEDURE — 96372 THER/PROPH/DIAG INJ SC/IM: CPT

## 2021-06-28 PROCEDURE — 87636 SARSCOV2 & INF A&B AMP PRB: CPT | Performed by: PHYSICIAN ASSISTANT

## 2021-06-28 PROCEDURE — 25010000002 CEFTRIAXONE: Performed by: PHYSICIAN ASSISTANT

## 2021-06-28 PROCEDURE — 84300 ASSAY OF URINE SODIUM: CPT | Performed by: FAMILY MEDICINE

## 2021-06-28 PROCEDURE — 96365 THER/PROPH/DIAG IV INF INIT: CPT

## 2021-06-28 PROCEDURE — 83930 ASSAY OF BLOOD OSMOLALITY: CPT | Performed by: FAMILY MEDICINE

## 2021-06-28 PROCEDURE — C9803 HOPD COVID-19 SPEC COLLECT: HCPCS

## 2021-06-28 PROCEDURE — 80053 COMPREHEN METABOLIC PANEL: CPT | Performed by: PHYSICIAN ASSISTANT

## 2021-06-28 RX ORDER — HYDRALAZINE HYDROCHLORIDE 20 MG/ML
10 INJECTION INTRAMUSCULAR; INTRAVENOUS EVERY 6 HOURS PRN
Status: DISCONTINUED | OUTPATIENT
Start: 2021-06-28 | End: 2021-06-30 | Stop reason: HOSPADM

## 2021-06-28 RX ORDER — ATORVASTATIN CALCIUM 40 MG/1
40 TABLET, FILM COATED ORAL DAILY
Status: DISCONTINUED | OUTPATIENT
Start: 2021-06-29 | End: 2021-06-30 | Stop reason: HOSPADM

## 2021-06-28 RX ORDER — ONDANSETRON 4 MG/1
4 TABLET, FILM COATED ORAL EVERY 6 HOURS PRN
Status: DISCONTINUED | OUTPATIENT
Start: 2021-06-28 | End: 2021-06-30 | Stop reason: HOSPADM

## 2021-06-28 RX ORDER — SODIUM CHLORIDE 0.9 % (FLUSH) 0.9 %
10 SYRINGE (ML) INJECTION EVERY 12 HOURS SCHEDULED
Status: DISCONTINUED | OUTPATIENT
Start: 2021-06-28 | End: 2021-06-30 | Stop reason: HOSPADM

## 2021-06-28 RX ORDER — ACETAMINOPHEN 650 MG/1
650 SUPPOSITORY RECTAL EVERY 4 HOURS PRN
Status: DISCONTINUED | OUTPATIENT
Start: 2021-06-28 | End: 2021-06-30 | Stop reason: HOSPADM

## 2021-06-28 RX ORDER — ACETAMINOPHEN 325 MG/1
650 TABLET ORAL EVERY 4 HOURS PRN
Status: DISCONTINUED | OUTPATIENT
Start: 2021-06-28 | End: 2021-06-30 | Stop reason: HOSPADM

## 2021-06-28 RX ORDER — CALCIUM CARBONATE 200(500)MG
2 TABLET,CHEWABLE ORAL 2 TIMES DAILY PRN
Status: DISCONTINUED | OUTPATIENT
Start: 2021-06-28 | End: 2021-06-30 | Stop reason: HOSPADM

## 2021-06-28 RX ORDER — HEPARIN SODIUM 5000 [USP'U]/ML
5000 INJECTION, SOLUTION INTRAVENOUS; SUBCUTANEOUS EVERY 12 HOURS SCHEDULED
Status: DISCONTINUED | OUTPATIENT
Start: 2021-06-28 | End: 2021-06-30 | Stop reason: HOSPADM

## 2021-06-28 RX ORDER — SODIUM CHLORIDE 0.9 % (FLUSH) 0.9 %
10 SYRINGE (ML) INJECTION AS NEEDED
Status: DISCONTINUED | OUTPATIENT
Start: 2021-06-28 | End: 2021-06-30 | Stop reason: HOSPADM

## 2021-06-28 RX ORDER — LOSARTAN POTASSIUM 50 MG/1
50 TABLET ORAL
Status: DISCONTINUED | OUTPATIENT
Start: 2021-06-29 | End: 2021-06-30 | Stop reason: HOSPADM

## 2021-06-28 RX ORDER — PANTOPRAZOLE SODIUM 40 MG/1
40 TABLET, DELAYED RELEASE ORAL EVERY MORNING
Status: DISCONTINUED | OUTPATIENT
Start: 2021-06-29 | End: 2021-06-30 | Stop reason: HOSPADM

## 2021-06-28 RX ORDER — SODIUM CHLORIDE 9 MG/ML
75 INJECTION, SOLUTION INTRAVENOUS CONTINUOUS
Status: DISPENSED | OUTPATIENT
Start: 2021-06-28 | End: 2021-06-29

## 2021-06-28 RX ORDER — ACETAMINOPHEN 160 MG/5ML
650 SOLUTION ORAL EVERY 4 HOURS PRN
Status: DISCONTINUED | OUTPATIENT
Start: 2021-06-28 | End: 2021-06-30 | Stop reason: HOSPADM

## 2021-06-28 RX ORDER — MULTIPLE VITAMINS W/ MINERALS TAB 9MG-400MCG
1 TAB ORAL DAILY
Status: DISCONTINUED | OUTPATIENT
Start: 2021-06-29 | End: 2021-06-30 | Stop reason: HOSPADM

## 2021-06-28 RX ORDER — ONDANSETRON 2 MG/ML
4 INJECTION INTRAMUSCULAR; INTRAVENOUS EVERY 6 HOURS PRN
Status: DISCONTINUED | OUTPATIENT
Start: 2021-06-28 | End: 2021-06-30 | Stop reason: HOSPADM

## 2021-06-28 RX ORDER — ASPIRIN 81 MG/1
81 TABLET ORAL DAILY
Status: DISCONTINUED | OUTPATIENT
Start: 2021-06-29 | End: 2021-06-30 | Stop reason: HOSPADM

## 2021-06-28 RX ORDER — LATANOPROST 50 UG/ML
1 SOLUTION/ DROPS OPHTHALMIC NIGHTLY
Status: DISCONTINUED | OUTPATIENT
Start: 2021-06-28 | End: 2021-06-30 | Stop reason: HOSPADM

## 2021-06-28 RX ADMIN — Medication 10 ML: at 17:10

## 2021-06-28 RX ADMIN — CEFTRIAXONE 1 G: 1 INJECTION, POWDER, FOR SOLUTION INTRAMUSCULAR; INTRAVENOUS at 17:29

## 2021-06-28 RX ADMIN — SODIUM CHLORIDE, PRESERVATIVE FREE 10 ML: 5 INJECTION INTRAVENOUS at 22:31

## 2021-06-28 RX ADMIN — HEPARIN SODIUM 5000 UNITS: 5000 INJECTION INTRAVENOUS; SUBCUTANEOUS at 23:29

## 2021-06-28 RX ADMIN — SODIUM CHLORIDE 75 ML/HR: 900 INJECTION, SOLUTION INTRAVENOUS at 23:30

## 2021-06-29 LAB
ALBUMIN SERPL-MCNC: 3.6 G/DL (ref 3.5–5.2)
ALBUMIN/GLOB SERPL: 1.2 G/DL
ALP SERPL-CCNC: 98 U/L (ref 39–117)
ALT SERPL W P-5'-P-CCNC: 32 U/L (ref 1–33)
ANION GAP SERPL CALCULATED.3IONS-SCNC: 8 MMOL/L (ref 5–15)
AST SERPL-CCNC: 30 U/L (ref 1–32)
BASOPHILS # BLD AUTO: 0.06 10*3/MM3 (ref 0–0.2)
BASOPHILS NFR BLD AUTO: 0.9 % (ref 0–1.5)
BILIRUB SERPL-MCNC: 0.3 MG/DL (ref 0–1.2)
BUN SERPL-MCNC: 21 MG/DL (ref 8–23)
BUN/CREAT SERPL: 18.4 (ref 7–25)
CALCIUM SPEC-SCNC: 9.3 MG/DL (ref 8.6–10.5)
CHLORIDE SERPL-SCNC: 93 MMOL/L (ref 98–107)
CO2 SERPL-SCNC: 25 MMOL/L (ref 22–29)
CREAT SERPL-MCNC: 1.14 MG/DL (ref 0.57–1)
DEPRECATED RDW RBC AUTO: 38.5 FL (ref 37–54)
EOSINOPHIL # BLD AUTO: 0.23 10*3/MM3 (ref 0–0.4)
EOSINOPHIL NFR BLD AUTO: 3.3 % (ref 0.3–6.2)
ERYTHROCYTE [DISTWIDTH] IN BLOOD BY AUTOMATED COUNT: 11.6 % (ref 12.3–15.4)
GFR SERPL CREATININE-BSD FRML MDRD: 46 ML/MIN/1.73
GLOBULIN UR ELPH-MCNC: 3 GM/DL
GLUCOSE SERPL-MCNC: 123 MG/DL (ref 65–99)
HCT VFR BLD AUTO: 33.4 % (ref 34–46.6)
HGB BLD-MCNC: 11.8 G/DL (ref 12–15.9)
HOLD SPECIMEN: NORMAL
IMM GRANULOCYTES # BLD AUTO: 0.01 10*3/MM3 (ref 0–0.05)
IMM GRANULOCYTES NFR BLD AUTO: 0.1 % (ref 0–0.5)
LYMPHOCYTES # BLD AUTO: 2.43 10*3/MM3 (ref 0.7–3.1)
LYMPHOCYTES NFR BLD AUTO: 34.8 % (ref 19.6–45.3)
MCH RBC QN AUTO: 32.1 PG (ref 26.6–33)
MCHC RBC AUTO-ENTMCNC: 35.3 G/DL (ref 31.5–35.7)
MCV RBC AUTO: 90.8 FL (ref 79–97)
MONOCYTES # BLD AUTO: 1.06 10*3/MM3 (ref 0.1–0.9)
MONOCYTES NFR BLD AUTO: 15.2 % (ref 5–12)
NEUTROPHILS NFR BLD AUTO: 3.2 10*3/MM3 (ref 1.7–7)
NEUTROPHILS NFR BLD AUTO: 45.7 % (ref 42.7–76)
NRBC BLD AUTO-RTO: 0 /100 WBC (ref 0–0.2)
OSMOLALITY SERPL: 274 MOSM/KG (ref 280–301)
PLATELET # BLD AUTO: 219 10*3/MM3 (ref 140–450)
PMV BLD AUTO: 9.8 FL (ref 6–12)
POTASSIUM SERPL-SCNC: 4 MMOL/L (ref 3.5–5.2)
PROT SERPL-MCNC: 6.6 G/DL (ref 6–8.5)
RBC # BLD AUTO: 3.68 10*6/MM3 (ref 3.77–5.28)
SODIUM SERPL-SCNC: 126 MMOL/L (ref 136–145)
TSH SERPL DL<=0.05 MIU/L-ACNC: 3.58 UIU/ML (ref 0.27–4.2)
WBC # BLD AUTO: 6.99 10*3/MM3 (ref 3.4–10.8)
WHOLE BLOOD HOLD SPECIMEN: NORMAL

## 2021-06-29 PROCEDURE — 25010000002 CEFTRIAXONE PER 250 MG: Performed by: FAMILY MEDICINE

## 2021-06-29 PROCEDURE — G0378 HOSPITAL OBSERVATION PER HR: HCPCS

## 2021-06-29 PROCEDURE — 25010000002 HEPARIN (PORCINE) PER 1000 UNITS: Performed by: FAMILY MEDICINE

## 2021-06-29 PROCEDURE — 80053 COMPREHEN METABOLIC PANEL: CPT | Performed by: FAMILY MEDICINE

## 2021-06-29 PROCEDURE — 96372 THER/PROPH/DIAG INJ SC/IM: CPT

## 2021-06-29 PROCEDURE — 96361 HYDRATE IV INFUSION ADD-ON: CPT

## 2021-06-29 PROCEDURE — 96366 THER/PROPH/DIAG IV INF ADDON: CPT

## 2021-06-29 PROCEDURE — 85025 COMPLETE CBC W/AUTO DIFF WBC: CPT | Performed by: FAMILY MEDICINE

## 2021-06-29 RX ORDER — DORZOLAMIDE HYDROCHLORIDE AND TIMOLOL MALEATE 20; 5 MG/ML; MG/ML
SOLUTION/ DROPS OPHTHALMIC 2 TIMES DAILY
Status: DISCONTINUED | OUTPATIENT
Start: 2021-06-29 | End: 2021-06-29

## 2021-06-29 RX ORDER — DORZOLAMIDE HYDROCHLORIDE AND TIMOLOL MALEATE 20; 5 MG/ML; MG/ML
1 SOLUTION/ DROPS OPHTHALMIC 2 TIMES DAILY
Status: DISCONTINUED | OUTPATIENT
Start: 2021-06-29 | End: 2021-06-30 | Stop reason: HOSPADM

## 2021-06-29 RX ADMIN — LATANOPROST 1 DROP: 50 SOLUTION OPHTHALMIC at 20:44

## 2021-06-29 RX ADMIN — ASPIRIN 81 MG: 81 TABLET, FILM COATED ORAL at 09:32

## 2021-06-29 RX ADMIN — PANTOPRAZOLE SODIUM 40 MG: 40 TABLET, DELAYED RELEASE ORAL at 09:32

## 2021-06-29 RX ADMIN — HEPARIN SODIUM 5000 UNITS: 5000 INJECTION INTRAVENOUS; SUBCUTANEOUS at 20:09

## 2021-06-29 RX ADMIN — Medication 1 TABLET: at 09:33

## 2021-06-29 RX ADMIN — CEFTRIAXONE SODIUM 1 G: 1 INJECTION, POWDER, FOR SOLUTION INTRAMUSCULAR; INTRAVENOUS at 15:06

## 2021-06-29 RX ADMIN — HEPARIN SODIUM 5000 UNITS: 5000 INJECTION INTRAVENOUS; SUBCUTANEOUS at 09:32

## 2021-06-29 RX ADMIN — LOSARTAN POTASSIUM 50 MG: 50 TABLET, FILM COATED ORAL at 09:33

## 2021-06-29 RX ADMIN — ATORVASTATIN CALCIUM 40 MG: 40 TABLET, FILM COATED ORAL at 09:32

## 2021-06-29 RX ADMIN — SODIUM CHLORIDE 75 ML/HR: 900 INJECTION, SOLUTION INTRAVENOUS at 15:01

## 2021-06-29 RX ADMIN — DORZOLAMIDE HYDROCHLORIDE AND TIMOLOL MALEATE 1 DROP: 20; 5 SOLUTION/ DROPS OPHTHALMIC at 20:17

## 2021-06-30 ENCOUNTER — TELEPHONE (OUTPATIENT)
Dept: FAMILY MEDICINE CLINIC | Facility: CLINIC | Age: 81
End: 2021-06-30

## 2021-06-30 ENCOUNTER — READMISSION MANAGEMENT (OUTPATIENT)
Dept: CALL CENTER | Facility: HOSPITAL | Age: 81
End: 2021-06-30

## 2021-06-30 VITALS
SYSTOLIC BLOOD PRESSURE: 148 MMHG | DIASTOLIC BLOOD PRESSURE: 65 MMHG | BODY MASS INDEX: 28.6 KG/M2 | TEMPERATURE: 96.2 F | WEIGHT: 167.5 LBS | RESPIRATION RATE: 18 BRPM | HEIGHT: 64 IN | HEART RATE: 90 BPM | OXYGEN SATURATION: 99 %

## 2021-06-30 LAB
ANION GAP SERPL CALCULATED.3IONS-SCNC: 9 MMOL/L (ref 5–15)
BACTERIA SPEC AEROBE CULT: NO GROWTH
BUN SERPL-MCNC: 18 MG/DL (ref 8–23)
BUN/CREAT SERPL: 17.8 (ref 7–25)
CALCIUM SPEC-SCNC: 8.8 MG/DL (ref 8.6–10.5)
CHLORIDE SERPL-SCNC: 104 MMOL/L (ref 98–107)
CO2 SERPL-SCNC: 21 MMOL/L (ref 22–29)
CREAT SERPL-MCNC: 1.01 MG/DL (ref 0.57–1)
GFR SERPL CREATININE-BSD FRML MDRD: 52 ML/MIN/1.73
GLUCOSE SERPL-MCNC: 112 MG/DL (ref 65–99)
POTASSIUM SERPL-SCNC: 4 MMOL/L (ref 3.5–5.2)
SODIUM SERPL-SCNC: 134 MMOL/L (ref 136–145)
WHOLE BLOOD HOLD SPECIMEN: NORMAL

## 2021-06-30 PROCEDURE — 96372 THER/PROPH/DIAG INJ SC/IM: CPT

## 2021-06-30 PROCEDURE — G0378 HOSPITAL OBSERVATION PER HR: HCPCS

## 2021-06-30 PROCEDURE — 36415 COLL VENOUS BLD VENIPUNCTURE: CPT | Performed by: FAMILY MEDICINE

## 2021-06-30 PROCEDURE — 80048 BASIC METABOLIC PNL TOTAL CA: CPT | Performed by: FAMILY MEDICINE

## 2021-06-30 PROCEDURE — 25010000002 HEPARIN (PORCINE) PER 1000 UNITS: Performed by: FAMILY MEDICINE

## 2021-06-30 RX ORDER — CEFDINIR 300 MG/1
300 CAPSULE ORAL 2 TIMES DAILY
Qty: 6 CAPSULE | Refills: 0 | Status: SHIPPED | OUTPATIENT
Start: 2021-06-30 | End: 2021-07-03

## 2021-06-30 RX ADMIN — LOSARTAN POTASSIUM 50 MG: 50 TABLET, FILM COATED ORAL at 08:35

## 2021-06-30 RX ADMIN — ATORVASTATIN CALCIUM 40 MG: 40 TABLET, FILM COATED ORAL at 08:36

## 2021-06-30 RX ADMIN — Medication 1 TABLET: at 08:36

## 2021-06-30 RX ADMIN — HEPARIN SODIUM 5000 UNITS: 5000 INJECTION INTRAVENOUS; SUBCUTANEOUS at 08:36

## 2021-06-30 RX ADMIN — ASPIRIN 81 MG: 81 TABLET, FILM COATED ORAL at 08:36

## 2021-06-30 RX ADMIN — SODIUM CHLORIDE, PRESERVATIVE FREE 10 ML: 5 INJECTION INTRAVENOUS at 08:36

## 2021-06-30 RX ADMIN — PANTOPRAZOLE SODIUM 40 MG: 40 TABLET, DELAYED RELEASE ORAL at 06:01

## 2021-06-30 RX ADMIN — DORZOLAMIDE HYDROCHLORIDE AND TIMOLOL MALEATE 1 DROP: 20; 5 SOLUTION/ DROPS OPHTHALMIC at 08:36

## 2021-06-30 NOTE — OUTREACH NOTE
Prep Survey      Responses   [facility] patient discharged from?  [place]   Is LACE score < 7 ?  Yes   Emergency Room discharge w/ pulse ox?  No   Eligibility  [facility]   Date of Admission  06/28/21   Date of Discharge  06/30/21   Discharge Disposition  Home or Self Care   Discharge diagnosis  hyponatremia, UTI   Does the patient have one of the following disease processes/diagnoses(primary or secondary)?  Other   Does the patient have Home health ordered?  No   Is there a DME ordered?  No   Prep survey completed?  Yes          nAtonina Retana RN

## 2021-07-01 ENCOUNTER — TRANSITIONAL CARE MANAGEMENT TELEPHONE ENCOUNTER (OUTPATIENT)
Dept: CALL CENTER | Facility: HOSPITAL | Age: 81
End: 2021-07-01

## 2021-07-01 NOTE — OUTREACH NOTE
Call Center TCM Note      Responses   [facility] patient discharged from?  [place]   Does the patient have one of the following disease processes/diagnoses(primary or secondary)?  Other   TCM attempt successful?  Yes   Call start time  1055   Call end time  1058   Discharge diagnosis  hyponatremia, UTI   Meds reviewed with patient/caregiver?  Yes   Is the patient having any side effects they believe may be caused by any medication additions or changes?  No   Does the patient have all medications ordered at discharge?  Yes   Is the patient taking all medications as directed (includes completed medication regime)?  Yes   Does the patient have a primary care provider?   Yes   Does the patient have an appointment with their PCP within 7 days of discharge?  Greater than 7 days   Comments regarding PCP  Hospital d/c f/u appt is on 7/8/21 at 3:00 pm    What is preventing the patient from scheduling follow up appointments within 7 days of discharge?  -- [unsure]   Nursing Interventions  Verified appointment date/time/provider   Has the patient kept scheduled appointments due by today?  N/A   Psychosocial issues?  No   Did the patient receive a copy of their discharge instructions?  Yes   Nursing interventions  Reviewed instructions with patient   What is the patient's perception of their health status since discharge?  Improving   Is the patient/caregiver able to teach back signs and symptoms related to disease process for when to call PCP?  Yes   Is the patient/caregiver able to teach back signs and symptoms related to disease process for when to call 911?  Yes   Is the patient/caregiver able to teach back the hierarchy of who to call/visit for symptoms/problems? PCP, Specialist, Home health nurse, Urgent Care, ED, 911  Yes   If the patient is a current smoker, are they able to teach back resources for cessation?  Not a smoker   TCM call completed?  Yes          Dedra Calvo RN    7/1/2021, 10:59 EDT

## 2021-07-08 ENCOUNTER — OFFICE VISIT (OUTPATIENT)
Dept: FAMILY MEDICINE CLINIC | Facility: CLINIC | Age: 81
End: 2021-07-08

## 2021-07-08 VITALS
DIASTOLIC BLOOD PRESSURE: 64 MMHG | WEIGHT: 163 LBS | BODY MASS INDEX: 27.83 KG/M2 | HEIGHT: 64 IN | SYSTOLIC BLOOD PRESSURE: 130 MMHG

## 2021-07-08 DIAGNOSIS — E87.1 HYPONATREMIA: ICD-10-CM

## 2021-07-08 DIAGNOSIS — N28.9 RENAL INSUFFICIENCY: Chronic | ICD-10-CM

## 2021-07-08 DIAGNOSIS — A49.9 UTI (URINARY TRACT INFECTION), BACTERIAL: Primary | ICD-10-CM

## 2021-07-08 DIAGNOSIS — N39.0 UTI (URINARY TRACT INFECTION), BACTERIAL: Primary | ICD-10-CM

## 2021-07-08 PROCEDURE — 99496 TRANSJ CARE MGMT HIGH F2F 7D: CPT | Performed by: FAMILY MEDICINE

## 2021-07-08 NOTE — PROGRESS NOTES
Transitional Care Follow Up Visit  Subjective     Lyndsay Brumfield is a 81 y.o. female who presents for a transitional care management visit.    Within 48 business hours after discharge our office contacted her via telephone to coordinate her care and needs.      I reviewed and discussed the details of that call along with the discharge summary, hospital problems, inpatient lab results, inpatient diagnostic studies, and consultation reports with Lyndsay.     Current outpatient and discharge medications have been reconciled for the patient.  Reviewed by: Lawson Garland MD      Date of TCM Phone Call 6/30/2021   [facility]   Date of Admission 6/28/2021   Date of Discharge 6/30/2021   Discharge Disposition Home or Self Care     Risk for Readmission (LACE) Score: 2 (6/30/2021  5:01 AM)      History of Present Illness   Course During Hospital Stay: Follow-up hospitalization last week for folate fatigue nausea UTI dehydration hyponatremia.  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  This was stopped UTI was treated quick resolution of hyponatremia symptoms.  Since being at home feels back to normal.  Is maintain hydration blood pressure is holding steady on just losartan alone.  Rest of lab work has returned to normal.  History noted.     The following portions of the patient's history were reviewed and updated as appropriate: allergies, current medications, past family history, past medical history, past social history, past surgical history and problem list.    Review of Systems   Constitutional: Negative for activity change, appetite change, fatigue and unexpected weight change.   HENT: Negative for trouble swallowing and voice change.    Eyes: Negative for redness and visual disturbance.   Respiratory: Negative for cough and wheezing.    Cardiovascular: Negative for chest pain and palpitations.   Gastrointestinal: Negative for abdominal  "pain, constipation, diarrhea, nausea and vomiting.   Genitourinary: Negative for urgency.   Musculoskeletal: Negative for joint swelling.   Neurological: Negative for syncope and headaches.   Hematological: Negative for adenopathy.   Psychiatric/Behavioral: Negative for sleep disturbance.       Objective   Physical Exam  Constitutional:       Appearance: She is well-developed.   HENT:      Head: Normocephalic.   Eyes:      Pupils: Pupils are equal, round, and reactive to light.   Neck:      Thyroid: No thyromegaly.   Cardiovascular:      Rate and Rhythm: Normal rate.      Heart sounds: Normal heart sounds. No murmur heard.   No friction rub. No gallop.    Pulmonary:      Breath sounds: Normal breath sounds.   Abdominal:      General: There is no distension.      Palpations: Abdomen is soft. There is no mass.      Tenderness: There is no abdominal tenderness.   Musculoskeletal:         General: Normal range of motion.      Cervical back: Normal range of motion.   Skin:     General: Skin is warm and dry.   Neurological:      Mental Status: She is alert and oriented to person, place, and time.      Deep Tendon Reflexes: Reflexes are normal and symmetric.   Psychiatric:         Mood and Affect: Mood normal.         Behavior: Behavior normal.         Thought Content: Thought content normal.         Judgment: Judgment normal.     /64   Ht 162.6 cm (64\")   Wt 73.9 kg (163 lb)   LMP  (LMP Unknown) Comment: Postmenopausal  BMI 27.98 kg/m²       Assessment/Plan   Diagnoses and all orders for this visit:    1. UTI (urinary tract infection), bacterial (Primary)    2. Hyponatremia    3. Renal insufficiency      Counseled on maintaining hydration counseled on schedule bladder emptying for prevention.  Stay off diuretic for now keep a watch on blood pressure at home.  Get flu vaccine in the fall keep regular follow-up appointment  Current outpatient and discharge medications have been reconciled for the " patient.  Reviewed by: Lawson Garland MD

## 2021-07-30 ENCOUNTER — TRANSCRIBE ORDERS (OUTPATIENT)
Dept: LAB | Facility: HOSPITAL | Age: 81
End: 2021-07-30

## 2021-07-30 ENCOUNTER — LAB (OUTPATIENT)
Dept: LAB | Facility: HOSPITAL | Age: 81
End: 2021-07-30

## 2021-07-30 DIAGNOSIS — N18.30 STAGE 3 CHRONIC KIDNEY DISEASE, UNSPECIFIED WHETHER STAGE 3A OR 3B CKD (HCC): ICD-10-CM

## 2021-07-30 DIAGNOSIS — N18.30 STAGE 3 CHRONIC KIDNEY DISEASE, UNSPECIFIED WHETHER STAGE 3A OR 3B CKD (HCC): Primary | ICD-10-CM

## 2021-07-30 PROCEDURE — 84156 ASSAY OF PROTEIN URINE: CPT

## 2021-07-30 PROCEDURE — 82570 ASSAY OF URINE CREATININE: CPT

## 2021-07-30 PROCEDURE — 36415 COLL VENOUS BLD VENIPUNCTURE: CPT

## 2021-07-31 LAB
CREAT UR-MCNC: 40.4 MG/DL
PROT UR-MCNC: 5 MG/DL
PROT/CREAT UR: 123.8 MG/G CREA (ref 0–200)

## 2021-08-03 ENCOUNTER — TRANSCRIBE ORDERS (OUTPATIENT)
Dept: LAB | Facility: HOSPITAL | Age: 81
End: 2021-08-03

## 2021-08-03 ENCOUNTER — LAB (OUTPATIENT)
Dept: LAB | Facility: HOSPITAL | Age: 81
End: 2021-08-03

## 2021-08-03 DIAGNOSIS — N18.30 STAGE 3 CHRONIC KIDNEY DISEASE, UNSPECIFIED WHETHER STAGE 3A OR 3B CKD (HCC): ICD-10-CM

## 2021-08-03 DIAGNOSIS — N18.30 STAGE 3 CHRONIC KIDNEY DISEASE, UNSPECIFIED WHETHER STAGE 3A OR 3B CKD (HCC): Primary | ICD-10-CM

## 2021-08-03 PROCEDURE — 80069 RENAL FUNCTION PANEL: CPT

## 2021-08-04 LAB
ALBUMIN SERPL-MCNC: 4.3 G/DL (ref 3.5–5.2)
ANION GAP SERPL CALCULATED.3IONS-SCNC: 7.9 MMOL/L (ref 5–15)
BUN SERPL-MCNC: 20 MG/DL (ref 8–23)
BUN/CREAT SERPL: 21.7 (ref 7–25)
CALCIUM SPEC-SCNC: 9.9 MG/DL (ref 8.6–10.5)
CHLORIDE SERPL-SCNC: 99 MMOL/L (ref 98–107)
CO2 SERPL-SCNC: 26.1 MMOL/L (ref 22–29)
CREAT SERPL-MCNC: 0.92 MG/DL (ref 0.57–1)
GFR SERPL CREATININE-BSD FRML MDRD: 58 ML/MIN/1.73
GLUCOSE SERPL-MCNC: 95 MG/DL (ref 65–99)
PHOSPHATE SERPL-MCNC: 3.2 MG/DL (ref 2.5–4.5)
POTASSIUM SERPL-SCNC: 4.5 MMOL/L (ref 3.5–5.2)
SODIUM SERPL-SCNC: 133 MMOL/L (ref 136–145)

## 2021-08-05 ENCOUNTER — TRANSCRIBE ORDERS (OUTPATIENT)
Dept: LAB | Facility: HOSPITAL | Age: 81
End: 2021-08-05

## 2021-08-05 DIAGNOSIS — N18.30 STAGE 3 CHRONIC KIDNEY DISEASE, UNSPECIFIED WHETHER STAGE 3A OR 3B CKD (HCC): Primary | ICD-10-CM

## 2021-08-31 ENCOUNTER — APPOINTMENT (OUTPATIENT)
Dept: GENERAL RADIOLOGY | Facility: HOSPITAL | Age: 81
End: 2021-08-31

## 2021-08-31 ENCOUNTER — HOSPITAL ENCOUNTER (EMERGENCY)
Facility: HOSPITAL | Age: 81
Discharge: HOME | End: 2021-08-31
Attending: STUDENT IN AN ORGANIZED HEALTH CARE EDUCATION/TRAINING PROGRAM | Admitting: STUDENT IN AN ORGANIZED HEALTH CARE EDUCATION/TRAINING PROGRAM

## 2021-08-31 VITALS
HEIGHT: 64 IN | DIASTOLIC BLOOD PRESSURE: 81 MMHG | OXYGEN SATURATION: 96 % | RESPIRATION RATE: 18 BRPM | BODY MASS INDEX: 27.83 KG/M2 | HEART RATE: 80 BPM | TEMPERATURE: 98 F | SYSTOLIC BLOOD PRESSURE: 165 MMHG | WEIGHT: 163 LBS

## 2021-08-31 DIAGNOSIS — M54.9 UPPER BACK PAIN ON RIGHT SIDE: Primary | ICD-10-CM

## 2021-08-31 LAB
ALBUMIN SERPL-MCNC: 3.9 G/DL (ref 3.5–5.2)
ALBUMIN/GLOB SERPL: 1.2 G/DL
ALP SERPL-CCNC: 90 U/L (ref 39–117)
ALT SERPL W P-5'-P-CCNC: 31 U/L (ref 1–33)
ANION GAP SERPL CALCULATED.3IONS-SCNC: 10 MMOL/L (ref 5–15)
AST SERPL-CCNC: 45 U/L (ref 1–32)
BASOPHILS # BLD AUTO: 0.06 10*3/MM3 (ref 0–0.2)
BASOPHILS NFR BLD AUTO: 0.7 % (ref 0–1.5)
BILIRUB SERPL-MCNC: 0.4 MG/DL (ref 0–1.2)
BILIRUB UR QL STRIP: NEGATIVE
BUN SERPL-MCNC: 21 MG/DL (ref 8–23)
BUN/CREAT SERPL: 21.6 (ref 7–25)
CALCIUM SPEC-SCNC: 9.2 MG/DL (ref 8.6–10.5)
CHLORIDE SERPL-SCNC: 95 MMOL/L (ref 98–107)
CLARITY UR: CLEAR
CO2 SERPL-SCNC: 22 MMOL/L (ref 22–29)
COLOR UR: YELLOW
CREAT SERPL-MCNC: 0.97 MG/DL (ref 0.57–1)
D-DIMER, QUANTITATIVE (MAD,POW, STR): 628 NG/ML (FEU) (ref 0–470)
DEPRECATED RDW RBC AUTO: 42.5 FL (ref 37–54)
EOSINOPHIL # BLD AUTO: 0.12 10*3/MM3 (ref 0–0.4)
EOSINOPHIL NFR BLD AUTO: 1.4 % (ref 0.3–6.2)
ERYTHROCYTE [DISTWIDTH] IN BLOOD BY AUTOMATED COUNT: 12.3 % (ref 12.3–15.4)
GFR SERPL CREATININE-BSD FRML MDRD: 55 ML/MIN/1.73
GLOBULIN UR ELPH-MCNC: 3.2 GM/DL
GLUCOSE SERPL-MCNC: 117 MG/DL (ref 65–99)
GLUCOSE UR STRIP-MCNC: NEGATIVE MG/DL
HCT VFR BLD AUTO: 33.8 % (ref 34–46.6)
HGB BLD-MCNC: 11.7 G/DL (ref 12–15.9)
HGB UR QL STRIP.AUTO: NEGATIVE
HOLD SPECIMEN: NORMAL
IMM GRANULOCYTES # BLD AUTO: 0.02 10*3/MM3 (ref 0–0.05)
IMM GRANULOCYTES NFR BLD AUTO: 0.2 % (ref 0–0.5)
KETONES UR QL STRIP: NEGATIVE
LEUKOCYTE ESTERASE UR QL STRIP.AUTO: NEGATIVE
LYMPHOCYTES # BLD AUTO: 1.39 10*3/MM3 (ref 0.7–3.1)
LYMPHOCYTES NFR BLD AUTO: 16.1 % (ref 19.6–45.3)
MCH RBC QN AUTO: 32.2 PG (ref 26.6–33)
MCHC RBC AUTO-ENTMCNC: 34.6 G/DL (ref 31.5–35.7)
MCV RBC AUTO: 93.1 FL (ref 79–97)
MONOCYTES # BLD AUTO: 1.05 10*3/MM3 (ref 0.1–0.9)
MONOCYTES NFR BLD AUTO: 12.2 % (ref 5–12)
NEUTROPHILS NFR BLD AUTO: 5.98 10*3/MM3 (ref 1.7–7)
NEUTROPHILS NFR BLD AUTO: 69.4 % (ref 42.7–76)
NITRITE UR QL STRIP: NEGATIVE
NRBC BLD AUTO-RTO: 0 /100 WBC (ref 0–0.2)
NT-PROBNP SERPL-MCNC: 322.6 PG/ML (ref 0–1800)
PH UR STRIP.AUTO: 7 [PH] (ref 5–9)
PLATELET # BLD AUTO: 254 10*3/MM3 (ref 140–450)
PMV BLD AUTO: 9.9 FL (ref 6–12)
POTASSIUM SERPL-SCNC: 4.2 MMOL/L (ref 3.5–5.2)
PROT SERPL-MCNC: 7.1 G/DL (ref 6–8.5)
PROT UR QL STRIP: NEGATIVE
QT INTERVAL: 348 MS
QTC INTERVAL: 411 MS
RBC # BLD AUTO: 3.63 10*6/MM3 (ref 3.77–5.28)
SODIUM SERPL-SCNC: 127 MMOL/L (ref 136–145)
SP GR UR STRIP: 1.01 (ref 1–1.03)
TROPONIN T SERPL-MCNC: <0.01 NG/ML (ref 0–0.03)
UROBILINOGEN UR QL STRIP: NORMAL
WBC # BLD AUTO: 8.62 10*3/MM3 (ref 3.4–10.8)

## 2021-08-31 PROCEDURE — 84484 ASSAY OF TROPONIN QUANT: CPT | Performed by: PHYSICIAN ASSISTANT

## 2021-08-31 PROCEDURE — 93005 ELECTROCARDIOGRAM TRACING: CPT | Performed by: PHYSICIAN ASSISTANT

## 2021-08-31 PROCEDURE — 83880 ASSAY OF NATRIURETIC PEPTIDE: CPT | Performed by: PHYSICIAN ASSISTANT

## 2021-08-31 PROCEDURE — 71045 X-RAY EXAM CHEST 1 VIEW: CPT

## 2021-08-31 PROCEDURE — 99283 EMERGENCY DEPT VISIT LOW MDM: CPT

## 2021-08-31 PROCEDURE — 81003 URINALYSIS AUTO W/O SCOPE: CPT | Performed by: PHYSICIAN ASSISTANT

## 2021-08-31 PROCEDURE — 85025 COMPLETE CBC W/AUTO DIFF WBC: CPT | Performed by: PHYSICIAN ASSISTANT

## 2021-08-31 PROCEDURE — 85379 FIBRIN DEGRADATION QUANT: CPT | Performed by: PHYSICIAN ASSISTANT

## 2021-08-31 PROCEDURE — 80053 COMPREHEN METABOLIC PANEL: CPT | Performed by: PHYSICIAN ASSISTANT

## 2021-08-31 PROCEDURE — 93010 ELECTROCARDIOGRAM REPORT: CPT | Performed by: INTERNAL MEDICINE

## 2021-08-31 RX ORDER — SODIUM CHLORIDE 0.9 % (FLUSH) 0.9 %
10 SYRINGE (ML) INJECTION AS NEEDED
Status: DISCONTINUED | OUTPATIENT
Start: 2021-08-31 | End: 2021-08-31 | Stop reason: HOSPADM

## 2021-09-02 ENCOUNTER — OFFICE VISIT (OUTPATIENT)
Dept: FAMILY MEDICINE CLINIC | Facility: CLINIC | Age: 81
End: 2021-09-02

## 2021-09-02 VITALS
WEIGHT: 160.9 LBS | HEIGHT: 64 IN | BODY MASS INDEX: 27.47 KG/M2 | DIASTOLIC BLOOD PRESSURE: 90 MMHG | SYSTOLIC BLOOD PRESSURE: 160 MMHG

## 2021-09-02 DIAGNOSIS — R11.0 NAUSEA: ICD-10-CM

## 2021-09-02 DIAGNOSIS — M54.6 ACUTE RIGHT-SIDED THORACIC BACK PAIN: Primary | ICD-10-CM

## 2021-09-02 DIAGNOSIS — B02.9 HERPES ZOSTER WITHOUT COMPLICATION: ICD-10-CM

## 2021-09-02 DIAGNOSIS — E87.1 HYPONATREMIA: ICD-10-CM

## 2021-09-02 PROCEDURE — 99214 OFFICE O/P EST MOD 30 MIN: CPT | Performed by: FAMILY MEDICINE

## 2021-09-02 RX ORDER — TRAMADOL HYDROCHLORIDE 50 MG/1
50 TABLET ORAL EVERY 6 HOURS PRN
Qty: 30 TABLET | Refills: 1 | Status: SHIPPED | OUTPATIENT
Start: 2021-09-02 | End: 2021-11-29

## 2021-09-02 RX ORDER — VALACYCLOVIR HYDROCHLORIDE 1 G/1
1000 TABLET, FILM COATED ORAL 3 TIMES DAILY
Qty: 21 TABLET | Refills: 0 | Status: SHIPPED | OUTPATIENT
Start: 2021-09-02 | End: 2021-09-16

## 2021-09-02 RX ORDER — ONDANSETRON 4 MG/1
4 TABLET, ORALLY DISINTEGRATING ORAL EVERY 8 HOURS PRN
Qty: 10 TABLET | Refills: 1 | Status: SHIPPED | OUTPATIENT
Start: 2021-09-02 | End: 2022-04-07

## 2021-09-02 NOTE — PROGRESS NOTES
Subjective   Lyndsay Brumfield is a 81 y.o. female.  Emergency room follow-up severe pain right upper back.  Shortness of breath associated with same concern about pulmonary embolism or other.  In ER studies were all normal.  However hyponatremia has returned with a sodium of 127 although asymptomatic.  Continues on Cozaar from nephrology his only blood pressure medicine.  Has not been eating or drinking as much the last several days due to pain.  Woke up this morning with a rash in the area.  Other medicines have remained the same.    History of Present Illness   HPI    The following portions of the patient's history were reviewed and updated as appropriate: allergies, current medications, past family history, past medical history, past social history, past surgical history and problem list.    Review of Systems  Review of Systems   Constitutional: Positive for appetite change (Mild anorexia). Negative for activity change, fatigue and unexpected weight change.   HENT: Negative for trouble swallowing and voice change.    Eyes: Negative for redness and visual disturbance.   Respiratory: Negative for cough and wheezing.    Cardiovascular: Negative for chest pain and palpitations.   Gastrointestinal: Positive for nausea (On occasion no vomiting). Negative for abdominal pain, constipation, diarrhea and vomiting.   Genitourinary: Negative for urgency.   Musculoskeletal: Positive for back pain. Negative for joint swelling.   Skin: Positive for rash.   Neurological: Negative for syncope and headaches.   Hematological: Negative for adenopathy.   Psychiatric/Behavioral: Negative for sleep disturbance.       Objective   Physical Exam  Physical Exam  Constitutional:       Appearance: She is well-developed.   HENT:      Head: Normocephalic.   Eyes:      Pupils: Pupils are equal, round, and reactive to light.   Neck:      Thyroid: No thyromegaly.   Cardiovascular:      Rate and Rhythm: Normal rate and regular rhythm.      Heart  "sounds: No murmur heard.   No friction rub. No gallop.    Pulmonary:      Breath sounds: Normal breath sounds.   Abdominal:      General: There is no distension.      Palpations: Abdomen is soft. There is no mass.      Tenderness: There is no abdominal tenderness.   Musculoskeletal:         General: Normal range of motion.      Cervical back: Normal range of motion.   Skin:     General: Skin is warm and dry.             Comments: Right subscapular around right lateral breast classic early shingles rash flat red early vesicles medial.  Normal range of motion   Neurological:      Mental Status: She is alert and oriented to person, place, and time.      Deep Tendon Reflexes: Reflexes are normal and symmetric.   Psychiatric:      Comments: No obvious discomfort states mildly uncomfortable           Visit Vitals  /90   Ht 162.6 cm (64\")   Wt 73 kg (160 lb 14.4 oz)   LMP  (LMP Unknown) Comment: Postmenopausal   BMI 27.62 kg/m²     Body mass index is 27.62 kg/m².    /90   Ht 162.6 cm (64\")   Wt 73 kg (160 lb 14.4 oz)   LMP  (LMP Unknown) Comment: Postmenopausal  BMI 27.62 kg/m²     Assessment/Plan   Diagnoses and all orders for this visit:    1. Acute right-sided thoracic back pain (Primary)  -     traMADol (ULTRAM) 50 MG tablet; Take 1 tablet by mouth Every 6 (Six) Hours As Needed for Moderate Pain .  Dispense: 30 tablet; Refill: 1    2. Herpes zoster without complication  -     valACYclovir (Valtrex) 1000 MG tablet; Take 1 tablet by mouth 3 (Three) Times a Day. Till gone for shingles  Dispense: 21 tablet; Refill: 0  -     ondansetron ODT (Zofran ODT) 4 MG disintegrating tablet; Place 1 tablet on the tongue Every 8 (Eight) Hours As Needed for Nausea or Vomiting.  Dispense: 10 tablet; Refill: 1  -     traMADol (ULTRAM) 50 MG tablet; Take 1 tablet by mouth Every 6 (Six) Hours As Needed for Moderate Pain .  Dispense: 30 tablet; Refill: 1    3. Hyponatremia    4. Nausea    Counseled increasing hydration more " fluids electrolytes etc.  Medicines as ordered both for treatment and symptomatic relief.  Counseled on skin care loose clothing etc.  Contact nephrologist in regards to the Cozaar.  Recheck in 2 weeks on pain control rash and also to repeat sodium.

## 2021-09-07 ENCOUNTER — TELEPHONE (OUTPATIENT)
Dept: FAMILY MEDICINE CLINIC | Facility: CLINIC | Age: 81
End: 2021-09-07

## 2021-09-07 NOTE — TELEPHONE ENCOUNTER
Mrs. Brumfield called and would like a call back from Fariha in Dr Garland' office. She did not state why.

## 2021-09-07 NOTE — TELEPHONE ENCOUNTER
PT CALLED AGAIN STATING SHE NEEDS TO SPEAK WITH DR. SILAS CORTEZ NURSE, ASAP.    PLEASE CALL PT BACK.

## 2021-09-10 ENCOUNTER — TELEPHONE (OUTPATIENT)
Dept: FAMILY MEDICINE CLINIC | Facility: CLINIC | Age: 81
End: 2021-09-10

## 2021-09-10 DIAGNOSIS — K59.01 SLOW TRANSIT CONSTIPATION: Primary | ICD-10-CM

## 2021-09-10 NOTE — TELEPHONE ENCOUNTER
Says that the medicine she is taking for her shingles has made her constipated and what you suggested earlier in the week isn't working and wants to know what else she needs to do.

## 2021-09-16 ENCOUNTER — OFFICE VISIT (OUTPATIENT)
Dept: FAMILY MEDICINE CLINIC | Facility: CLINIC | Age: 81
End: 2021-09-16

## 2021-09-16 ENCOUNTER — LAB (OUTPATIENT)
Dept: LAB | Facility: HOSPITAL | Age: 81
End: 2021-09-16

## 2021-09-16 VITALS
DIASTOLIC BLOOD PRESSURE: 90 MMHG | SYSTOLIC BLOOD PRESSURE: 156 MMHG | BODY MASS INDEX: 27.49 KG/M2 | WEIGHT: 161 LBS | HEIGHT: 64 IN

## 2021-09-16 DIAGNOSIS — R73.9 HYPERGLYCEMIA: Chronic | ICD-10-CM

## 2021-09-16 DIAGNOSIS — B02.29 POST HERPETIC NEURALGIA: ICD-10-CM

## 2021-09-16 DIAGNOSIS — E78.49 OTHER HYPERLIPIDEMIA: Chronic | ICD-10-CM

## 2021-09-16 DIAGNOSIS — B02.9 HERPES ZOSTER WITHOUT COMPLICATION: Primary | ICD-10-CM

## 2021-09-16 DIAGNOSIS — E87.1 HYPONATREMIA: ICD-10-CM

## 2021-09-16 DIAGNOSIS — R30.0 DYSURIA: ICD-10-CM

## 2021-09-16 LAB
ANION GAP SERPL CALCULATED.3IONS-SCNC: 11 MMOL/L (ref 5–15)
BACTERIA UR QL AUTO: ABNORMAL /HPF
BILIRUB UR QL STRIP: NEGATIVE
BUN SERPL-MCNC: 18 MG/DL (ref 8–23)
BUN/CREAT SERPL: 16.5 (ref 7–25)
CALCIUM SPEC-SCNC: 9 MG/DL (ref 8.6–10.5)
CHLORIDE SERPL-SCNC: 91 MMOL/L (ref 98–107)
CHOLEST SERPL-MCNC: 118 MG/DL (ref 0–200)
CLARITY UR: ABNORMAL
CO2 SERPL-SCNC: 26 MMOL/L (ref 22–29)
COLOR UR: YELLOW
CREAT SERPL-MCNC: 1.09 MG/DL (ref 0.57–1)
GFR SERPL CREATININE-BSD FRML MDRD: 48 ML/MIN/1.73
GLUCOSE SERPL-MCNC: 106 MG/DL (ref 65–99)
GLUCOSE UR STRIP-MCNC: NEGATIVE MG/DL
HBA1C MFR BLD: 6.3 % (ref 4.8–5.6)
HDLC SERPL-MCNC: 67 MG/DL (ref 40–60)
HGB UR QL STRIP.AUTO: NEGATIVE
HYALINE CASTS UR QL AUTO: ABNORMAL /LPF
KETONES UR QL STRIP: NEGATIVE
LDLC SERPL CALC-MCNC: 34 MG/DL (ref 0–100)
LDLC/HDLC SERPL: 0.5 {RATIO}
LEUKOCYTE ESTERASE UR QL STRIP.AUTO: ABNORMAL
NITRITE UR QL STRIP: POSITIVE
PH UR STRIP.AUTO: 6.5 [PH] (ref 5–9)
POTASSIUM SERPL-SCNC: 4.1 MMOL/L (ref 3.5–5.2)
PROT UR QL STRIP: ABNORMAL
RBC # UR: ABNORMAL /HPF
REF LAB TEST METHOD: ABNORMAL
SODIUM SERPL-SCNC: 128 MMOL/L (ref 136–145)
SP GR UR STRIP: 1.02 (ref 1–1.03)
SQUAMOUS #/AREA URNS HPF: ABNORMAL /HPF
TRIGL SERPL-MCNC: 86 MG/DL (ref 0–150)
UROBILINOGEN UR QL STRIP: ABNORMAL
VLDLC SERPL-MCNC: 17 MG/DL (ref 5–40)
WBC UR QL AUTO: ABNORMAL /HPF

## 2021-09-16 PROCEDURE — 83036 HEMOGLOBIN GLYCOSYLATED A1C: CPT

## 2021-09-16 PROCEDURE — 80048 BASIC METABOLIC PNL TOTAL CA: CPT | Performed by: FAMILY MEDICINE

## 2021-09-16 PROCEDURE — 81001 URINALYSIS AUTO W/SCOPE: CPT | Performed by: FAMILY MEDICINE

## 2021-09-16 PROCEDURE — 36415 COLL VENOUS BLD VENIPUNCTURE: CPT | Performed by: FAMILY MEDICINE

## 2021-09-16 PROCEDURE — 99214 OFFICE O/P EST MOD 30 MIN: CPT | Performed by: FAMILY MEDICINE

## 2021-09-16 PROCEDURE — 80061 LIPID PANEL: CPT

## 2021-09-16 RX ORDER — CEFUROXIME AXETIL 250 MG/1
250 TABLET ORAL 2 TIMES DAILY
Qty: 20 TABLET | Refills: 0 | Status: SHIPPED | OUTPATIENT
Start: 2021-09-16 | End: 2021-10-07

## 2021-09-16 RX ORDER — GABAPENTIN 300 MG/1
CAPSULE ORAL
Qty: 40 CAPSULE | Refills: 1 | Status: SHIPPED | OUTPATIENT
Start: 2021-09-16 | End: 2021-10-07 | Stop reason: SDUPTHER

## 2021-09-16 NOTE — PROGRESS NOTES
Subjective   Lyndsay Brumfield is a 81 y.o. female.  Reevaluation zoster.  States still having pain and burning 2 to 3 weeks out.  Did take all of antiviral pain medicine.  Probably going need some gabapentin.  The skin itself is healed fairly well.  Also complaining of fatigue lightheadedness mild dysuria like she had with her last UTI.  Very vague symptoms we will recheck a sodium from her history of hyponatremia in the urine.  History noted.    History of Present Illness   HPI    The following portions of the patient's history were reviewed and updated as appropriate: allergies, current medications, past family history, past medical history, past social history, past surgical history and problem list.    Review of Systems  Review of Systems   Constitutional: Positive for fatigue. Negative for activity change, appetite change and unexpected weight change.   HENT: Negative for trouble swallowing and voice change.    Eyes: Negative for redness and visual disturbance.   Respiratory: Negative for cough and wheezing.    Cardiovascular: Negative for chest pain and palpitations.   Gastrointestinal: Negative for abdominal pain, constipation, diarrhea, nausea and vomiting.   Genitourinary: Positive for dysuria. Negative for urgency.   Musculoskeletal: Positive for arthralgias and myalgias. Negative for joint swelling.   Neurological: Negative for syncope and headaches.   Hematological: Negative for adenopathy.   Psychiatric/Behavioral: Negative for sleep disturbance.       Objective   Physical Exam  Physical Exam  Constitutional:       Appearance: Normal appearance. She is well-developed.   HENT:      Head: Normocephalic.      Right Ear: External ear normal.      Nose: Nose normal.   Eyes:      Pupils: Pupils are equal, round, and reactive to light.   Neck:      Thyroid: No thyromegaly.   Cardiovascular:      Rate and Rhythm: Normal rate and regular rhythm.      Heart sounds: No murmur heard.   No friction rub. No gallop.   "  Abdominal:      General: There is no distension.      Palpations: Abdomen is soft. There is no mass.      Tenderness: There is no abdominal tenderness.   Musculoskeletal:         General: Normal range of motion.      Cervical back: Normal range of motion.   Skin:     General: Skin is warm and dry.      Comments: Zoster area healed up just some mild discoloration now.   Neurological:      Mental Status: She is alert and oriented to person, place, and time.      Deep Tendon Reflexes: Reflexes are normal and symmetric.   Psychiatric:         Mood and Affect: Affect is blunt.         Speech: Speech is delayed.         Behavior: Behavior is slowed.      Comments: Flat affect           Visit Vitals  /90   Ht 162.6 cm (64\")   Wt 73 kg (161 lb)   LMP  (LMP Unknown) Comment: Postmenopausal   BMI 27.64 kg/m²     Body mass index is 27.64 kg/m².    /90   Ht 162.6 cm (64\")   Wt 73 kg (161 lb)   LMP  (LMP Unknown) Comment: Postmenopausal  BMI 27.64 kg/m²     Assessment/Plan   Diagnoses and all orders for this visit:    1. Herpes zoster without complication (Primary)  -     gabapentin (Neurontin) 300 MG capsule; 1 qhs x 5 days then 1 po bid for shingles pain till seen again  Dispense: 40 capsule; Refill: 1    2. Post herpetic neuralgia  -     gabapentin (Neurontin) 300 MG capsule; 1 qhs x 5 days then 1 po bid for shingles pain till seen again  Dispense: 40 capsule; Refill: 1    3. Dysuria  -     Urinalysis With Microscopic - Urine, Clean Catch    4. Hyponatremia  -     Basic Metabolic Panel    Counseled on disease process.  Start low-dose gabapentin continue skin care.  Counseled on pros and cons limitations of same lab work adjustments accordingly recheck on medicine 3 weeks  "

## 2021-10-07 ENCOUNTER — OFFICE VISIT (OUTPATIENT)
Dept: FAMILY MEDICINE CLINIC | Facility: CLINIC | Age: 81
End: 2021-10-07

## 2021-10-07 VITALS
SYSTOLIC BLOOD PRESSURE: 130 MMHG | WEIGHT: 159 LBS | BODY MASS INDEX: 27.14 KG/M2 | DIASTOLIC BLOOD PRESSURE: 70 MMHG | HEIGHT: 64 IN

## 2021-10-07 DIAGNOSIS — Z23 NEED FOR VACCINATION: ICD-10-CM

## 2021-10-07 DIAGNOSIS — B02.29 POST HERPETIC NEURALGIA: Primary | ICD-10-CM

## 2021-10-07 DIAGNOSIS — B02.9 HERPES ZOSTER WITHOUT COMPLICATION: ICD-10-CM

## 2021-10-07 DIAGNOSIS — Z00.00 MEDICARE ANNUAL WELLNESS VISIT, SUBSEQUENT: ICD-10-CM

## 2021-10-07 PROCEDURE — 90662 IIV NO PRSV INCREASED AG IM: CPT | Performed by: FAMILY MEDICINE

## 2021-10-07 PROCEDURE — 99213 OFFICE O/P EST LOW 20 MIN: CPT | Performed by: FAMILY MEDICINE

## 2021-10-07 PROCEDURE — 96160 PT-FOCUSED HLTH RISK ASSMT: CPT | Performed by: FAMILY MEDICINE

## 2021-10-07 PROCEDURE — 1125F AMNT PAIN NOTED PAIN PRSNT: CPT | Performed by: FAMILY MEDICINE

## 2021-10-07 PROCEDURE — G0439 PPPS, SUBSEQ VISIT: HCPCS | Performed by: FAMILY MEDICINE

## 2021-10-07 PROCEDURE — 1159F MED LIST DOCD IN RCRD: CPT | Performed by: FAMILY MEDICINE

## 2021-10-07 PROCEDURE — G0008 ADMIN INFLUENZA VIRUS VAC: HCPCS | Performed by: FAMILY MEDICINE

## 2021-10-07 RX ORDER — GABAPENTIN 400 MG/1
CAPSULE ORAL
Qty: 60 CAPSULE | Refills: 1 | Status: SHIPPED | OUTPATIENT
Start: 2021-10-07 | End: 2021-11-29 | Stop reason: SDUPTHER

## 2021-10-07 NOTE — PROGRESS NOTES
Subjective   Lyndsay Brumfield is a 81 y.o. female.  Reevaluation post herpetic neuralgia back pain.  Interim is developed some mild thoracic back pain which predates the shingles.  More mechanical.  States pain is gone down somewhat on gabapentin is causing a little bit of dizziness.  Overall otherwise stable.    History of Present Illness   HPI    The following portions of the patient's history were reviewed and updated as appropriate: allergies, current medications, past family history, past medical history, past social history, past surgical history and problem list.    Review of Systems  Review of Systems   Constitutional: Negative for activity change, appetite change, fatigue and unexpected weight change.   HENT: Negative for trouble swallowing and voice change.    Eyes: Negative for redness and visual disturbance.   Respiratory: Negative for cough and wheezing.    Cardiovascular: Negative for chest pain and palpitations.   Gastrointestinal: Negative for abdominal pain, constipation, diarrhea, nausea and vomiting.   Genitourinary: Negative for urgency.   Musculoskeletal: Positive for back pain. Negative for joint swelling.   Neurological: Positive for dizziness. Negative for syncope and headaches.   Hematological: Negative for adenopathy.   Psychiatric/Behavioral: Negative for sleep disturbance.       Objective   Physical Exam  Physical Exam  Constitutional:       Appearance: She is well-developed.   HENT:      Head: Normocephalic.   Eyes:      Pupils: Pupils are equal, round, and reactive to light.   Neck:      Thyroid: No thyromegaly.   Cardiovascular:      Rate and Rhythm: Normal rate and regular rhythm.      Heart sounds: Normal heart sounds. No murmur heard.   No friction rub. No gallop.    Pulmonary:      Breath sounds: Normal breath sounds.   Abdominal:      General: There is no distension.      Palpations: Abdomen is soft. There is no mass.      Tenderness: There is no abdominal tenderness.  "  Musculoskeletal:         General: Normal range of motion.      Cervical back: Normal range of motion.      Comments: Mild thoracic kyphosis mild full tenderness to full range of motion flexion extension get up and go 3 to 5 seconds gait normal   Skin:     General: Skin is warm and dry.   Neurological:      Mental Status: She is alert and oriented to person, place, and time.      Deep Tendon Reflexes: Reflexes are normal and symmetric.   Psychiatric:         Attention and Perception: Attention normal.         Mood and Affect: Mood normal.         Speech: Speech normal.         Behavior: Behavior normal.         Thought Content: Thought content normal.         Cognition and Memory: Cognition normal.           Visit Vitals  /70 (BP Location: Right arm, Patient Position: Sitting)   Ht 162.6 cm (64\")   Wt 72.1 kg (159 lb)   LMP  (LMP Unknown) Comment: Postmenopausal   BMI 27.29 kg/m²     Body mass index is 27.29 kg/m².  /70 (BP Location: Right arm, Patient Position: Sitting)   Ht 162.6 cm (64\")   Wt 72.1 kg (159 lb)   LMP  (LMP Unknown) Comment: Postmenopausal  BMI 27.29 kg/m²       Assessment/Plan   Diagnoses and all orders for this visit:    1. Post herpetic neuralgia (Primary)  -     gabapentin (Neurontin) 400 MG capsule; 1 bid for shingles.  New dosing next bottle  Dispense: 60 capsule; Refill: 1    2. Need for vaccination  -     Fluzone High-Dose 65+yrs ([id number])    3. Herpes zoster without complication  -     gabapentin (Neurontin) 400 MG capsule; 1 bid for shingles.  New dosing next bottle  Dispense: 60 capsule; Refill: 1    4. Medicare annual wellness visit, subsequent    Counseled on back exercises heat rest massage stretching increase gabapentin 400 twice daily and hold there.  Recheck again regular appointment for blood pressure etc. at the end of November we will see about how long to use gabapentin  "

## 2021-10-07 NOTE — PROGRESS NOTES
"The ABCs of the Annual Wellness Visit  Subsequent Medicare Wellness Visit    Chief Complaint   Patient presents with   • Herpes Zoster   • Back Pain     under rib cage, \"sharp when taking a deep breath\"      Subjective    History of Present Illness:  Lyndsay Brumfield is a 81 y.o. female who presents for a Subsequent Medicare Wellness Visit.    The following portions of the patient's history were reviewed and   updated as appropriate: allergies, current medications, past family history, past medical history, past social history, past surgical history and problem list.    Compared to one year ago, the patient feels her physical   health is worse.    Compared to one year ago, the patient feels her mental   health is the same.    Recent Hospitalizations:  This patient has had a [facility] admission record on file within the last 365 days.    Current Medical Providers:  Patient Care Team:  Lawson Garland MD as PCP - General (Family Medicine)    Outpatient Medications Prior to Visit   Medication Sig Dispense Refill   • atorvastatin (LIPITOR) 40 MG tablet Take 1 tablet by mouth Daily. 90 tablet 3   • Calcium-Magnesium-Vitamin D (CALCIUM 500 PO) Take  by mouth.     • dorzolamide-timolol (COSOPT) 22.3-6.8 MG/ML ophthalmic solution INSTILL 1 DROP IN RIGHT EYE TWICE DAILY  6   • latanoprost (XALATAN) 0.005 % ophthalmic solution 1 drop Every Night.     • linaclotide (LINZESS) 72 MCG capsule capsule Take 1 capsule by mouth Every Morning Before Breakfast. For bowels.  May increase to 2 qd after 3-4 days if needed. 40 capsule 1   • losartan (COZAAR) 50 MG tablet      • Multiple Vitamins-Minerals (MULTIVITAMIN WITH MINERALS) tablet tablet Take 1 tablet by mouth Daily.     • omeprazole (priLOSEC) 20 MG capsule Take 1 capsule by mouth Daily. 90 capsule 2   • ondansetron ODT (Zofran ODT) 4 MG disintegrating tablet Place 1 tablet on the tongue Every 8 (Eight) Hours As Needed for Nausea or Vomiting. 10 tablet 1   • polycarbophil " "(FIBERCON) 625 MG tablet Take 625 mg by mouth Daily.     • SM Aspirin Adult Low Strength 81 MG EC tablet TAKE 1 TABLET BY MOUTH EVERY DAY 90 tablet 1   • traMADol (ULTRAM) 50 MG tablet Take 1 tablet by mouth Every 6 (Six) Hours As Needed for Moderate Pain . 30 tablet 1   • cefuroxime (CEFTIN) 250 MG tablet Take 1 tablet by mouth 2 (Two) Times a Day. With food for uti 20 tablet 0   • gabapentin (Neurontin) 300 MG capsule 1 qhs x 5 days then 1 po bid for shingles pain till seen again 40 capsule 1     No facility-administered medications prior to visit.       Opioid medication/s are on active medication list.  and I have evaluated her active treatment plan and pain score trends (see table).  Vitals:    10/07/21 1056   PainSc:   6   PainLoc: Back     I have reviewed the chart for potential of high risk medication and harmful drug interactions in the elderly.            Aspirin is on active medication list. Aspirin use is not indicated based on review of current medical condition/s. Risk of harm outweighs potential benefits. Patient instructed to discontinue this medication.  .      Patient Active Problem List   Diagnosis   • Essential hypertension   • Primary open angle glaucoma   • Nuclear senile cataract of right eye   • Nuclear senile cataract of right eye   • Hammer toe   • Glaucoma, both eyes   • GERD (gastroesophageal reflux disease)   • Artificial lens present   • Allergic rhinitis   • Other hyperlipidemia   • Hyperglycemia   • Post herpetic neuralgia     Advance Care Planning  Advance Directive is not on file.  ACP discussion was held with the patient during this visit. Patient does not have an advance directive, information provided.          Objective    Vitals:    10/07/21 1056   BP: 130/70   BP Location: Right arm   Patient Position: Sitting   Weight: 72.1 kg (159 lb)   Height: 162.6 cm (64\")   PainSc:   6   PainLoc: Back     BMI Readings from Last 1 Encounters:   10/07/21 27.29 kg/m²   BMI is above normal " parameters. Recommendations include: none (medical contraindication)    Does the patient have evidence of cognitive impairment? No    Physical Exam  Lab Results   Component Value Date    TRIG 86 09/16/2021    HDL 67 (H) 09/16/2021    LDL 34 09/16/2021    VLDL 17 09/16/2021    HGBA1C 6.30 (H) 09/16/2021            HEALTH RISK ASSESSMENT    Smoking Status:  Social History     Tobacco Use   Smoking Status Never Smoker   Smokeless Tobacco Never Used     Alcohol Consumption:  Social History     Substance and Sexual Activity   Alcohol Use No     Fall Risk Screen:    STEADI Fall Risk Assessment was completed, and patient is at LOW risk for falls.Assessment completed on:5/27/2021    Depression Screening:  PHQ-2/PHQ-9 Depression Screening 5/27/2021   Little interest or pleasure in doing things 0   Feeling down, depressed, or hopeless 0   Total Score 0       Health Habits and Functional and Cognitive Screening:  No flowsheet data found.    Age-appropriate Screening Schedule:  Refer to the list below for future screening recommendations based on patient's age, sex and/or medical conditions. Orders for these recommended tests are listed in the plan section. The patient has been provided with a written plan.    Health Maintenance   Topic Date Due   • LIPID PANEL  09/16/2022   • INFLUENZA VACCINE  Completed   • DXA SCAN  Discontinued   • TDAP/TD VACCINES  Discontinued   • ZOSTER VACCINE  Discontinued              Assessment/Plan   CMS Preventative Services Quick Reference  Risk Factors Identified During Encounter  Chronic Pain   Immunizations Discussed/Encouraged (specific Immunizations; Influenza  The above risks/problems have been discussed with the patient.  Follow up actions/plans if indicated are seen below in the Assessment/Plan Section.  Pertinent information has been shared with the patient in the After Visit Summary.    Diagnoses and all orders for this visit:    1. Post herpetic neuralgia (Primary)  -     gabapentin  (Neurontin) 400 MG capsule; 1 bid for shingles.  New dosing next bottle  Dispense: 60 capsule; Refill: 1    2. Need for vaccination  -     Fluzone High-Dose 65+yrs ([id number])    3. Herpes zoster without complication  -     gabapentin (Neurontin) 400 MG capsule; 1 bid for shingles.  New dosing next bottle  Dispense: 60 capsule; Refill: 1    4. Medicare annual wellness visit, subsequent        Follow Up:   Return for Next scheduled follow up.     An After Visit Summary and PPPS were made available to the patient.          Worse related to current shingles episode.

## 2021-11-22 RX ORDER — ASPIRIN 81 MG/1
TABLET, COATED ORAL
Qty: 90 TABLET | Refills: 0 | Status: SHIPPED | OUTPATIENT
Start: 2021-11-22 | End: 2022-02-14

## 2021-11-29 ENCOUNTER — OFFICE VISIT (OUTPATIENT)
Dept: FAMILY MEDICINE CLINIC | Facility: CLINIC | Age: 81
End: 2021-11-29

## 2021-11-29 VITALS
SYSTOLIC BLOOD PRESSURE: 126 MMHG | HEIGHT: 64 IN | WEIGHT: 161.4 LBS | DIASTOLIC BLOOD PRESSURE: 70 MMHG | BODY MASS INDEX: 27.55 KG/M2

## 2021-11-29 DIAGNOSIS — I10 ESSENTIAL HYPERTENSION: Primary | Chronic | ICD-10-CM

## 2021-11-29 DIAGNOSIS — B02.9 HERPES ZOSTER WITHOUT COMPLICATION: ICD-10-CM

## 2021-11-29 DIAGNOSIS — E78.49 OTHER HYPERLIPIDEMIA: Chronic | ICD-10-CM

## 2021-11-29 DIAGNOSIS — B02.29 POST HERPETIC NEURALGIA: Chronic | ICD-10-CM

## 2021-11-29 PROCEDURE — 99213 OFFICE O/P EST LOW 20 MIN: CPT | Performed by: FAMILY MEDICINE

## 2021-11-29 RX ORDER — GABAPENTIN 400 MG/1
CAPSULE ORAL
Qty: 60 CAPSULE | Refills: 2 | Status: SHIPPED | OUTPATIENT
Start: 2021-11-29 | End: 2022-02-28

## 2021-11-29 RX ORDER — LIDOCAINE 50 MG/G
1 PATCH TOPICAL EVERY 24 HOURS
Qty: 30 EACH | Refills: 5 | Status: SHIPPED | OUTPATIENT
Start: 2021-11-29 | End: 2022-02-28

## 2021-11-29 NOTE — PROGRESS NOTES
Subjective   Lyndsay Brumfield is a 81 y.o. female.  Reevaluation for tabetic neuralgia hypertension.  Still having pain when working long the back.  The gabapentin does cause some dizziness we do not really need to increase the dosing any.  Blood pressure medicines have remained the same.  Also requesting prosthesis due for history of breast cancer.    History of Present Illness   HPI    The following portions of the patient's history were reviewed and updated as appropriate: allergies, current medications, past family history, past medical history, past social history, past surgical history and problem list.    Review of Systems  Review of Systems   Constitutional: Negative for activity change, appetite change, fatigue and unexpected weight change.   HENT: Negative for trouble swallowing and voice change.    Eyes: Negative for redness and visual disturbance.   Respiratory: Negative for cough and wheezing.    Cardiovascular: Negative for chest pain and palpitations.   Gastrointestinal: Negative for abdominal pain, constipation, diarrhea, nausea and vomiting.   Genitourinary: Negative for urgency.   Musculoskeletal: Positive for back pain. Negative for joint swelling.   Neurological: Negative for syncope and headaches.   Hematological: Negative for adenopathy.   Psychiatric/Behavioral: Negative for sleep disturbance.       Objective   Physical Exam  Physical Exam  Constitutional:       Appearance: She is well-developed.   HENT:      Head: Normocephalic.   Eyes:      Pupils: Pupils are equal, round, and reactive to light.   Neck:      Thyroid: No thyromegaly.   Cardiovascular:      Rate and Rhythm: Normal rate and regular rhythm.      Heart sounds: Normal heart sounds. No murmur heard.  No friction rub. No gallop.    Pulmonary:      Breath sounds: Normal breath sounds.   Abdominal:      General: There is no distension.      Palpations: Abdomen is soft. There is no mass.      Tenderness: There is no abdominal  "tenderness.   Musculoskeletal:         General: Normal range of motion.      Cervical back: Normal range of motion.      Comments: Get up and go 3 to 5 seconds gait normal   Skin:     General: Skin is warm and dry.   Neurological:      Mental Status: She is alert and oriented to person, place, and time.      Deep Tendon Reflexes: Reflexes are normal and symmetric.   Psychiatric:         Mood and Affect: Affect is blunt.         Speech: Speech normal.         Behavior: Behavior normal.         Thought Content: Thought content normal.         Cognition and Memory: Cognition normal.           Visit Vitals  /70   Ht 162.6 cm (64\")   Wt 73.2 kg (161 lb 6.4 oz)   LMP  (LMP Unknown) Comment: Postmenopausal   BMI 27.70 kg/m²     Body mass index is 27.7 kg/m².    /70   Ht 162.6 cm (64\")   Wt 73.2 kg (161 lb 6.4 oz)   LMP  (LMP Unknown) Comment: Postmenopausal  BMI 27.70 kg/m²     Assessment/Plan   Diagnoses and all orders for this visit:    1. Essential hypertension (Primary)    2. Other hyperlipidemia    3. Post herpetic neuralgia  -     gabapentin (Neurontin) 400 MG capsule; 1 bid for shingles.  New dosing next bottle  Dispense: 60 capsule; Refill: 2  -     lidocaine (LIDODERM) 5 %; Place 1 patch on the skin as directed by provider Daily. Remove & Discard patch within 12 hours or as directed by MD  Dispense: 30 each; Refill: 5    4. Herpes zoster without complication  -     gabapentin (Neurontin) 400 MG capsule; 1 bid for shingles.  New dosing next bottle  Dispense: 60 capsule; Refill: 2    Continue gabapentin at current dose add lidocaine patch to the back during the day.  Counseled on continued blood pressure medication.  Recheck again in 3 months.  "

## 2021-12-23 PROCEDURE — 87086 URINE CULTURE/COLONY COUNT: CPT | Performed by: NURSE PRACTITIONER

## 2021-12-23 PROCEDURE — 87186 SC STD MICRODIL/AGAR DIL: CPT | Performed by: NURSE PRACTITIONER

## 2022-01-31 ENCOUNTER — LAB (OUTPATIENT)
Dept: LAB | Facility: HOSPITAL | Age: 82
End: 2022-01-31

## 2022-01-31 ENCOUNTER — TRANSCRIBE ORDERS (OUTPATIENT)
Dept: LAB | Facility: HOSPITAL | Age: 82
End: 2022-01-31

## 2022-01-31 DIAGNOSIS — N18.30 STAGE 3 CHRONIC KIDNEY DISEASE, UNSPECIFIED WHETHER STAGE 3A OR 3B CKD: ICD-10-CM

## 2022-01-31 DIAGNOSIS — N18.30 STAGE 3 CHRONIC KIDNEY DISEASE, UNSPECIFIED WHETHER STAGE 3A OR 3B CKD: Primary | ICD-10-CM

## 2022-01-31 LAB
DEPRECATED RDW RBC AUTO: 42.4 FL (ref 37–54)
ERYTHROCYTE [DISTWIDTH] IN BLOOD BY AUTOMATED COUNT: 12.1 % (ref 12.3–15.4)
HCT VFR BLD AUTO: 37.6 % (ref 34–46.6)
HGB BLD-MCNC: 12.8 G/DL (ref 12–15.9)
MCH RBC QN AUTO: 32.7 PG (ref 26.6–33)
MCHC RBC AUTO-ENTMCNC: 34 G/DL (ref 31.5–35.7)
MCV RBC AUTO: 96.2 FL (ref 79–97)
PLATELET # BLD AUTO: 225 10*3/MM3 (ref 140–450)
PMV BLD AUTO: 11.5 FL (ref 6–12)
PTH-INTACT SERPL-MCNC: 55.2 PG/ML (ref 15–65)
RBC # BLD AUTO: 3.91 10*6/MM3 (ref 3.77–5.28)
WBC NRBC COR # BLD: 6.9 10*3/MM3 (ref 3.4–10.8)

## 2022-01-31 PROCEDURE — 82570 ASSAY OF URINE CREATININE: CPT

## 2022-01-31 PROCEDURE — 36415 COLL VENOUS BLD VENIPUNCTURE: CPT

## 2022-01-31 PROCEDURE — 82306 VITAMIN D 25 HYDROXY: CPT

## 2022-01-31 PROCEDURE — 84156 ASSAY OF PROTEIN URINE: CPT

## 2022-01-31 PROCEDURE — 85027 COMPLETE CBC AUTOMATED: CPT

## 2022-01-31 PROCEDURE — 83970 ASSAY OF PARATHORMONE: CPT

## 2022-01-31 PROCEDURE — 83735 ASSAY OF MAGNESIUM: CPT

## 2022-01-31 PROCEDURE — 80069 RENAL FUNCTION PANEL: CPT

## 2022-02-01 LAB
25(OH)D3 SERPL-MCNC: 59 NG/ML (ref 30–100)
ALBUMIN SERPL-MCNC: 4.2 G/DL (ref 3.5–5.2)
ANION GAP SERPL CALCULATED.3IONS-SCNC: 14 MMOL/L (ref 5–15)
BUN SERPL-MCNC: 23 MG/DL (ref 8–23)
BUN/CREAT SERPL: 24.5 (ref 7–25)
CALCIUM SPEC-SCNC: 9.9 MG/DL (ref 8.6–10.5)
CHLORIDE SERPL-SCNC: 99 MMOL/L (ref 98–107)
CO2 SERPL-SCNC: 22 MMOL/L (ref 22–29)
CREAT SERPL-MCNC: 0.94 MG/DL (ref 0.57–1)
CREAT UR-MCNC: 48.5 MG/DL
GFR SERPL CREATININE-BSD FRML MDRD: 57 ML/MIN/1.73
GLUCOSE SERPL-MCNC: 133 MG/DL (ref 65–99)
MAGNESIUM SERPL-MCNC: 2.1 MG/DL (ref 1.6–2.4)
PHOSPHATE SERPL-MCNC: 3.8 MG/DL (ref 2.5–4.5)
POTASSIUM SERPL-SCNC: 4.1 MMOL/L (ref 3.5–5.2)
PROT ?TM UR-MCNC: 8 MG/DL
PROT/CREAT UR: 164.9 MG/G CREA (ref 0–200)
SODIUM SERPL-SCNC: 135 MMOL/L (ref 136–145)

## 2022-02-14 RX ORDER — ASPIRIN 81 MG/1
TABLET, COATED ORAL
Qty: 90 TABLET | Refills: 0 | Status: SHIPPED | OUTPATIENT
Start: 2022-02-14 | End: 2022-05-20

## 2022-02-28 ENCOUNTER — OFFICE VISIT (OUTPATIENT)
Dept: FAMILY MEDICINE CLINIC | Facility: CLINIC | Age: 82
End: 2022-02-28

## 2022-02-28 VITALS
WEIGHT: 156.5 LBS | SYSTOLIC BLOOD PRESSURE: 128 MMHG | HEIGHT: 65 IN | DIASTOLIC BLOOD PRESSURE: 80 MMHG | BODY MASS INDEX: 26.08 KG/M2

## 2022-02-28 DIAGNOSIS — E78.49 OTHER HYPERLIPIDEMIA: Chronic | ICD-10-CM

## 2022-02-28 DIAGNOSIS — I10 ESSENTIAL HYPERTENSION: Primary | Chronic | ICD-10-CM

## 2022-02-28 DIAGNOSIS — R73.9 HYPERGLYCEMIA: Chronic | ICD-10-CM

## 2022-02-28 DIAGNOSIS — K21.9 GASTROESOPHAGEAL REFLUX DISEASE WITHOUT ESOPHAGITIS: Chronic | ICD-10-CM

## 2022-02-28 DIAGNOSIS — N18.30 STAGE 3 CHRONIC KIDNEY DISEASE, UNSPECIFIED WHETHER STAGE 3A OR 3B CKD: ICD-10-CM

## 2022-02-28 PROBLEM — B02.29 POST HERPETIC NEURALGIA: Chronic | Status: RESOLVED | Noted: 2021-09-16 | Resolved: 2022-02-28

## 2022-02-28 PROCEDURE — 99213 OFFICE O/P EST LOW 20 MIN: CPT | Performed by: FAMILY MEDICINE

## 2022-02-28 NOTE — PROGRESS NOTES
Subjective   Lyndsay Brumfield is a 81 y.o. female.  Reevaluation postherpetic neuralgia hypertension history of hyper glycemia diagnoses below in the interim family is gone over postherpetic neuralgia stop gabapentin and Lidoderm with good effect.  Lab work noted from renal.  Is having increasing problems with dyspepsia postprandial nausea known hiatal hernia already on PPI.  Probably needs a revisit with her gastroenterologist.  Feels well otherwise.  Is up-to-date on all other.    History of Present Illness   HPI    The following portions of the patient's history were reviewed and updated as appropriate: allergies, current medications, past family history, past medical history, past social history, past surgical history and problem list.    Review of Systems  Review of Systems   Constitutional: Positive for appetite change (Mild decrease appetite). Negative for activity change, fatigue and unexpected weight change.   HENT: Negative for trouble swallowing and voice change.    Eyes: Negative for redness and visual disturbance.   Respiratory: Negative for cough and wheezing.    Cardiovascular: Negative for chest pain and palpitations.   Gastrointestinal: Positive for abdominal distention (Postprandial) and nausea (On occasion). Negative for abdominal pain, constipation, diarrhea and vomiting.   Genitourinary: Negative for urgency.   Musculoskeletal: Negative for joint swelling.   Neurological: Negative for syncope and headaches.   Hematological: Negative for adenopathy.   Psychiatric/Behavioral: Negative for sleep disturbance.       Objective   Physical Exam  Physical Exam  Constitutional:       Appearance: She is well-developed.   HENT:      Head: Normocephalic.   Eyes:      Pupils: Pupils are equal, round, and reactive to light.   Neck:      Thyroid: No thyromegaly.   Cardiovascular:      Rate and Rhythm: Normal rate and regular rhythm.      Heart sounds: Normal heart sounds. No murmur heard.  No friction rub. No  "gallop.    Pulmonary:      Breath sounds: Normal breath sounds.   Abdominal:      General: There is no distension.      Palpations: Abdomen is soft. There is no mass.      Tenderness: There is no abdominal tenderness.   Musculoskeletal:         General: Normal range of motion.      Cervical back: Normal range of motion.   Skin:     General: Skin is warm and dry.   Neurological:      Mental Status: She is alert and oriented to person, place, and time.      Deep Tendon Reflexes: Reflexes are normal and symmetric.   Psychiatric:         Attention and Perception: Attention normal.         Mood and Affect: Mood normal.         Speech: Speech normal.         Behavior: Behavior normal.         Thought Content: Thought content normal.         Cognition and Memory: Cognition normal.           Visit Vitals  /80   Ht 165.1 cm (65\")   Wt 71 kg (156 lb 8 oz)   LMP  (LMP Unknown) Comment: Postmenopausal   BMI 26.04 kg/m²     Body mass index is 26.04 kg/m².  /80   Ht 165.1 cm (65\")   Wt 71 kg (156 lb 8 oz)   LMP  (LMP Unknown) Comment: Postmenopausal  BMI 26.04 kg/m²       Assessment/Plan   Diagnoses and all orders for this visit:    1. Essential hypertension (Primary)    2. Other hyperlipidemia    3. Hyperglycemia    4. Gastroesophageal reflux disease without esophagitis  -     Ambulatory Referral to Gastroenterology    5. Stage 3 chronic kidney disease, unspecified whether stage 3a or 3b CKD (HCC)    Referred back to her gastroenterologist counseled on lifestyle measures diet exercise hydration following up with all subspecialist.  Stop gabapentin stop Lidoderm recheck 6months.  Be time for A1c here at that time  "

## 2022-03-16 ENCOUNTER — TELEPHONE (OUTPATIENT)
Dept: FAMILY MEDICINE CLINIC | Facility: CLINIC | Age: 82
End: 2022-03-16

## 2022-03-16 DIAGNOSIS — Z90.10 HISTORY OF MASTECTOMY, UNSPECIFIED LATERALITY: Primary | ICD-10-CM

## 2022-03-16 NOTE — TELEPHONE ENCOUNTER
Rina from [facility] called and is requesting we resend the prescription for the Mastectomy bra and form that was sent in November 2021. They have lost the original script for it, and can not file again with insurance until they have a script. Fax #[phone].

## 2022-03-22 ENCOUNTER — TELEPHONE (OUTPATIENT)
Dept: FAMILY MEDICINE CLINIC | Facility: CLINIC | Age: 82
End: 2022-03-22

## 2022-03-22 NOTE — TELEPHONE ENCOUNTER
Rina from [facility] called stating that they are needing Dr Garland to send in a new order for the products that the pt received in November or December of 2021, the one that was sent over last was dated for the current date and for insurance purposes that wouldn't work. I asked Dr garland about it and advised Rina that Dr garland would need a form from them stating that the original order was misplaced on their end and he would need to document and sign with current date, not back date said form. Rina isn't sure that she has a form to use. She is going to fax over something after talking with her billing department.

## 2022-03-22 NOTE — TELEPHONE ENCOUNTER
[facility] - [place], [place] - [address] - [phone]  - [phone] FX      Pts forms and bras for mastectomy products needs to be resent and dated from appt pt had  On 11/29/2021 please we had sent but with wrong date     Fax [phone]  Any questions call [phone] Rina

## 2022-04-07 ENCOUNTER — OFFICE VISIT (OUTPATIENT)
Dept: GASTROENTEROLOGY | Facility: CLINIC | Age: 82
End: 2022-04-07

## 2022-04-07 VITALS
SYSTOLIC BLOOD PRESSURE: 130 MMHG | HEIGHT: 61 IN | DIASTOLIC BLOOD PRESSURE: 77 MMHG | HEART RATE: 95 BPM | WEIGHT: 154.4 LBS | BODY MASS INDEX: 29.15 KG/M2

## 2022-04-07 DIAGNOSIS — R13.19 OTHER DYSPHAGIA: Primary | ICD-10-CM

## 2022-04-07 PROCEDURE — 99204 OFFICE O/P NEW MOD 45 MIN: CPT | Performed by: INTERNAL MEDICINE

## 2022-04-07 RX ORDER — DEXTROSE AND SODIUM CHLORIDE 5; .45 G/100ML; G/100ML
30 INJECTION, SOLUTION INTRAVENOUS CONTINUOUS PRN
Status: CANCELLED | OUTPATIENT
Start: 2022-04-13

## 2022-04-07 RX ORDER — NETARSUDIL 0.2 MG/ML
1 SOLUTION/ DROPS OPHTHALMIC; TOPICAL NIGHTLY
COMMUNITY

## 2022-04-07 NOTE — H&P (VIEW-ONLY)
[facility]      Chief Complaint:   Chief Complaint   Patient presents with   • Other - Reflux       Subjective     HPI:   Patient with symptoms of reflux and episodes of food getting caught in her esophagus.  Patient states she is losing weight because she is having difficulty eating with that.  Patient states that this been occurring over the past few weeks.    Plan; we will schedule patient for EGD to evaluate.  If stricture is seen will dilate.  If no lesions are found in the esophagus causing patient symptoms will consider CT scan of the abdomen and lab work    Past Medical History:   Past Medical History:   Diagnosis Date   • Allergic rhinitis    • Artificial lens present     Artificial lens in position - left      • Benign hypertension    • Corns and callus    • Dry cough     most likely due to ACE inhibitor     • Essential hypertension    • GERD (gastroesophageal reflux disease)    • Glaucoma, both eyes    • Hammer toe    • History of Papanicolaou smear of cervix 01/19/2004    Atrophy present.   • Malabsorption of glucose    • Nondisplaced fracture of right radial styloid process, initial encounter for closed fracture    • Nuclear senile cataract of right eye    • Nuclear senile cataract of right eye    • Obesity    • Pain in right hand    • Primary open angle glaucoma     controlled left right IOP increased but OCT stable         Past Surgical History:  Past Surgical History:   Procedure Laterality Date   • BREAST SURGERY  11/22/1974    Cystic disease, right breast. Excision of lesion of right breast.   • ENDOSCOPY AND COLONOSCOPY  02/11/2008    A few diverticula in the sigmoid.   • ESOPHAGOSCOPY / EGD  03/10/2008    Grade II esophagitis of the distal esophagus. Multiple biopsies were obtained from the distal esophagus. The stomach appeared normal. The duodenum appeared normal   • EXTERNAL EAR SURGERY  04/25/2001    Full thickness wedge excision of the left ear, with layered closure    • EYE SURGERY  02/19/2013    Remove cataract, insert lens (1)    left eye    • MASTECTOMY  01/10/1991    Total mastectomy with level I and II node dissection 2   • OTHER SURGICAL HISTORY  03/20/2015    OCT DISC NFL 98084 (2) (Primary open angle glaucoma)        Family History:  Family History   Problem Relation Age of Onset   • Cancer Other    • Diabetes Other    • Heart disease Other        Social History:   reports that she has never smoked. She has never used smokeless tobacco. She reports that she does not drink alcohol and does not use drugs.    Medications:   Prior to Admission medications    Medication Sig Start Date End Date Taking? Authorizing Provider   Aspirin Low Dose 81 MG EC tablet TAKE 1 TABLET EVERY DAY 2/14/22  Yes Lawson Garland MD   atorvastatin (LIPITOR) 40 MG tablet Take 1 tablet by mouth Daily. 5/27/21  Yes Lawson Garland MD   Calcium-Magnesium-Vitamin D (CALCIUM 500 PO) Take 1 tablet by mouth 2 (Two) Times a Day.   Yes Emergency, Nurse Dante RN   dorzolamide-timolol (COSOPT) 22.3-6.8 MG/ML ophthalmic solution Administer 1 drop to the right eye 2 (Two) Times a Day. 11/15/19  Yes Emergency, Nurse Dante RN   latanoprost (XALATAN) 0.005 % ophthalmic solution Administer 1 drop to both eyes Every Night.   Yes Ashvin Segura MD   losartan (COZAAR) 50 MG tablet Take 50 mg by mouth Daily. 5/28/21  Yes Emergency, Nurse Epic, RN   Multiple Vitamins-Minerals (MULTIVITAMIN WITH MINERALS) tablet tablet Take 1 tablet by mouth Daily.   Yes Ashvin Segura MD   Netarsudil Dimesylate (Rhopressa) 0.02 % solution Administer 1 drop to the right eye Every Night.   Yes Ashvin Segura MD   omeprazole (priLOSEC) 20 MG capsule Take 1 capsule by mouth Daily. 5/27/21  Yes Lawson Garland MD   polycarbophil (FIBERCON) 625 MG tablet Take 625 mg by mouth Daily.   Yes Ashvin Segura MD   linaclotide (LINZESS) 72 MCG capsule capsule Take 1 capsule by mouth Every Morning Before Breakfast. For  "bowels.  May increase to 2 qd after 3-4 days if needed. 9/10/21 4/7/22  Lawson Garland MD   ondansetron ODT (Zofran ODT) 4 MG disintegrating tablet Place 1 tablet on the tongue Every 8 (Eight) Hours As Needed for Nausea or Vomiting. 9/2/21 4/7/22  Lawson Garland MD       Allergies:  Sulfa antibiotics    ROS:    Review of Systems   Constitutional: Negative for activity change, appetite change, chills, diaphoresis, fatigue, fever and unexpected weight change.   HENT: Negative for sore throat and trouble swallowing.    Respiratory: Negative for shortness of breath.    Gastrointestinal: Negative for abdominal distention, abdominal pain, anal bleeding, blood in stool, constipation, diarrhea, nausea, rectal pain and vomiting.   Endocrine: Negative for polydipsia, polyphagia and polyuria.   Genitourinary: Negative for difficulty urinating.   Musculoskeletal: Negative for arthralgias.   Skin: Negative for pallor.   Allergic/Immunologic: Negative for food allergies.   Neurological: Negative for weakness and light-headedness.   Psychiatric/Behavioral: Negative for behavioral problems.     Objective     Blood pressure 130/77, pulse 95, height 154.9 cm (61\"), weight 70 kg (154 lb 6.4 oz), not currently breastfeeding.    Physical Exam  Constitutional:       General: She is not in acute distress.     Appearance: She is well-developed. She is not diaphoretic.   HENT:      Head: Normocephalic and atraumatic.   Cardiovascular:      Rate and Rhythm: Normal rate and regular rhythm.      Heart sounds: Normal heart sounds. No murmur heard.    No friction rub. No gallop.   Pulmonary:      Effort: No respiratory distress.      Breath sounds: Normal breath sounds. No wheezing or rales.   Chest:      Chest wall: No tenderness.   Abdominal:      General: Bowel sounds are normal. There is no distension.      Palpations: Abdomen is soft. There is no mass.      Tenderness: There is no abdominal tenderness. There is no guarding or rebound.      " Hernia: No hernia is present.   Musculoskeletal:         General: Normal range of motion.   Skin:     General: Skin is warm and dry.      Coloration: Skin is not pale.      Findings: No erythema or rash.   Neurological:      Mental Status: She is alert and oriented to person, place, and time.   Psychiatric:         Behavior: Behavior normal.         Thought Content: Thought content normal.         Judgment: Judgment normal.          Assessment/Plan   Diagnoses and all orders for this visit:    1. Other dysphagia (Primary)  -     Case Request; Standing  -     COVID PRE-OP / PRE-PROCEDURE SCREENING ORDER (NO ISOLATION) - Swab, Nasopharynx; Future  -     Case Request    Other orders  -     Follow Anesthesia Guidelines / Standing Orders; Future  -     Obtain Informed Consent; Future        ESOPHAGOGASTRODUODENOSCOPY (N/A)     Diagnosis Plan   1. Other dysphagia  Case Request    COVID PRE-OP / PRE-PROCEDURE SCREENING ORDER (NO ISOLATION) - Swab, Nasopharynx    Case Request       Anticipated Surgical Procedure:  Orders Placed This Encounter   Procedures   • COVID PRE-OP / PRE-PROCEDURE SCREENING ORDER (NO ISOLATION) - Swab, Nasopharynx     Standing Status:   Future     Standing Expiration Date:   4/7/2023     Order Specific Question:   Release to patient     Answer:   Immediate     Order Specific Question:   Please select your location:     Answer:   [facility]     Order Specific Question:   COVID Screening Order:     Answer:   In-House: PRE-OP: BD MAX, 8-10 HR TAT [[id number]]     Order Specific Question:   Previously tested for COVID-19?     Answer:   Yes     Order Specific Question:   Employed in healthcare setting?     Answer:   No     Order Specific Question:   Symptomatic for COVID-19 as defined by CDC?     Answer:   No     Order Specific Question:   Hospitalized for COVID-19?     Answer:   No     Order Specific Question:   Admitted to ICU for COVID-19?     Answer:   No     Order Specific Question:   Resident in a  congregate (group) care setting?     Answer:   No     Order Specific Question:   Pregnant?     Answer:   No   • Follow Anesthesia Guidelines / Standing Orders     Standing Status:   Future   • Obtain Informed Consent     Standing Status:   Future     Order Specific Question:   Informed Consent Given For     Answer:   ESOPHAGOGASTRODUODENOSCOPY       The risks, benefits, and alternatives of this procedure have been discussed with the patient or the responsible party- the patient understands and agrees to proceed.

## 2022-04-07 NOTE — PROGRESS NOTES
[facility]      Chief Complaint:   Chief Complaint   Patient presents with   • Other - Reflux       Subjective     HPI:   Patient with symptoms of reflux and episodes of food getting caught in her esophagus.  Patient states she is losing weight because she is having difficulty eating with that.  Patient states that this been occurring over the past few weeks.    Plan; we will schedule patient for EGD to evaluate.  If stricture is seen will dilate.  If no lesions are found in the esophagus causing patient symptoms will consider CT scan of the abdomen and lab work    Past Medical History:   Past Medical History:   Diagnosis Date   • Allergic rhinitis    • Artificial lens present     Artificial lens in position - left      • Benign hypertension    • Corns and callus    • Dry cough     most likely due to ACE inhibitor     • Essential hypertension    • GERD (gastroesophageal reflux disease)    • Glaucoma, both eyes    • Hammer toe    • History of Papanicolaou smear of cervix 01/19/2004    Atrophy present.   • Malabsorption of glucose    • Nondisplaced fracture of right radial styloid process, initial encounter for closed fracture    • Nuclear senile cataract of right eye    • Nuclear senile cataract of right eye    • Obesity    • Pain in right hand    • Primary open angle glaucoma     controlled left right IOP increased but OCT stable         Past Surgical History:  Past Surgical History:   Procedure Laterality Date   • BREAST SURGERY  11/22/1974    Cystic disease, right breast. Excision of lesion of right breast.   • ENDOSCOPY AND COLONOSCOPY  02/11/2008    A few diverticula in the sigmoid.   • ESOPHAGOSCOPY / EGD  03/10/2008    Grade II esophagitis of the distal esophagus. Multiple biopsies were obtained from the distal esophagus. The stomach appeared normal. The duodenum appeared normal   • EXTERNAL EAR SURGERY  04/25/2001    Full thickness wedge excision of the left ear, with layered closure    • EYE SURGERY  02/19/2013    Remove cataract, insert lens (1)    left eye    • MASTECTOMY  01/10/1991    Total mastectomy with level I and II node dissection 2   • OTHER SURGICAL HISTORY  03/20/2015    OCT DISC NFL [id number] (2) (Primary open angle glaucoma)        Family History:  Family History   Problem Relation Age of Onset   • Cancer Other    • Diabetes Other    • Heart disease Other        Social History:   reports that she has never smoked. She has never used smokeless tobacco. She reports that she does not drink alcohol and does not use drugs.    Medications:   Prior to Admission medications    Medication Sig Start Date End Date Taking? Authorizing Provider   Aspirin Low Dose 81 MG EC tablet TAKE 1 TABLET EVERY DAY 2/14/22  Yes Lawson Garland MD   atorvastatin (LIPITOR) 40 MG tablet Take 1 tablet by mouth Daily. 5/27/21  Yes Lawson Garland MD   Calcium-Magnesium-Vitamin D (CALCIUM 500 PO) Take 1 tablet by mouth 2 (Two) Times a Day.   Yes Emergency, Nurse Dante RN   dorzolamide-timolol (COSOPT) 22.3-6.8 MG/ML ophthalmic solution Administer 1 drop to the right eye 2 (Two) Times a Day. 11/15/19  Yes Emergency, Nurse Dante RN   latanoprost (XALATAN) 0.005 % ophthalmic solution Administer 1 drop to both eyes Every Night.   Yes Ashvin Segura MD   losartan (COZAAR) 50 MG tablet Take 50 mg by mouth Daily. 5/28/21  Yes Emergency, Nurse Epic, RN   Multiple Vitamins-Minerals (MULTIVITAMIN WITH MINERALS) tablet tablet Take 1 tablet by mouth Daily.   Yes Ashvin Segura MD   Netarsudil Dimesylate (Rhopressa) 0.02 % solution Administer 1 drop to the right eye Every Night.   Yes Ashvin Segura MD   omeprazole (priLOSEC) 20 MG capsule Take 1 capsule by mouth Daily. 5/27/21  Yes Lawson Garland MD   polycarbophil (FIBERCON) 625 MG tablet Take 625 mg by mouth Daily.   Yes Ashvin Segura MD   linaclotide (LINZESS) 72 MCG capsule capsule Take 1 capsule by mouth Every Morning Before Breakfast. For  "bowels.  May increase to 2 qd after 3-4 days if needed. 9/10/21 4/7/22  Lawson Garland MD   ondansetron ODT (Zofran ODT) 4 MG disintegrating tablet Place 1 tablet on the tongue Every 8 (Eight) Hours As Needed for Nausea or Vomiting. 9/2/21 4/7/22  Lawson Garland MD       Allergies:  Sulfa antibiotics    ROS:    Review of Systems   Constitutional: Negative for activity change, appetite change, chills, diaphoresis, fatigue, fever and unexpected weight change.   HENT: Negative for sore throat and trouble swallowing.    Respiratory: Negative for shortness of breath.    Gastrointestinal: Negative for abdominal distention, abdominal pain, anal bleeding, blood in stool, constipation, diarrhea, nausea, rectal pain and vomiting.   Endocrine: Negative for polydipsia, polyphagia and polyuria.   Genitourinary: Negative for difficulty urinating.   Musculoskeletal: Negative for arthralgias.   Skin: Negative for pallor.   Allergic/Immunologic: Negative for food allergies.   Neurological: Negative for weakness and light-headedness.   Psychiatric/Behavioral: Negative for behavioral problems.     Objective     Blood pressure 130/77, pulse 95, height 154.9 cm (61\"), weight 70 kg (154 lb 6.4 oz), not currently breastfeeding.    Physical Exam  Constitutional:       General: She is not in acute distress.     Appearance: She is well-developed. She is not diaphoretic.   HENT:      Head: Normocephalic and atraumatic.   Cardiovascular:      Rate and Rhythm: Normal rate and regular rhythm.      Heart sounds: Normal heart sounds. No murmur heard.    No friction rub. No gallop.   Pulmonary:      Effort: No respiratory distress.      Breath sounds: Normal breath sounds. No wheezing or rales.   Chest:      Chest wall: No tenderness.   Abdominal:      General: Bowel sounds are normal. There is no distension.      Palpations: Abdomen is soft. There is no mass.      Tenderness: There is no abdominal tenderness. There is no guarding or rebound.      " Hernia: No hernia is present.   Musculoskeletal:         General: Normal range of motion.   Skin:     General: Skin is warm and dry.      Coloration: Skin is not pale.      Findings: No erythema or rash.   Neurological:      Mental Status: She is alert and oriented to person, place, and time.   Psychiatric:         Behavior: Behavior normal.         Thought Content: Thought content normal.         Judgment: Judgment normal.          Assessment/Plan   Diagnoses and all orders for this visit:    1. Other dysphagia (Primary)  -     Case Request; Standing  -     COVID PRE-OP / PRE-PROCEDURE SCREENING ORDER (NO ISOLATION) - Swab, Nasopharynx; Future  -     Case Request    Other orders  -     Follow Anesthesia Guidelines / Standing Orders; Future  -     Obtain Informed Consent; Future        ESOPHAGOGASTRODUODENOSCOPY (N/A)     Diagnosis Plan   1. Other dysphagia  Case Request    COVID PRE-OP / PRE-PROCEDURE SCREENING ORDER (NO ISOLATION) - Swab, Nasopharynx    Case Request       Anticipated Surgical Procedure:  Orders Placed This Encounter   Procedures   • COVID PRE-OP / PRE-PROCEDURE SCREENING ORDER (NO ISOLATION) - Swab, Nasopharynx     Standing Status:   Future     Standing Expiration Date:   4/7/2023     Order Specific Question:   Release to patient     Answer:   Immediate     Order Specific Question:   Please select your location:     Answer:   [facility]     Order Specific Question:   COVID Screening Order:     Answer:   In-House: PRE-OP: BD MAX, 8-10 HR TAT [[id number]]     Order Specific Question:   Previously tested for COVID-19?     Answer:   Yes     Order Specific Question:   Employed in healthcare setting?     Answer:   No     Order Specific Question:   Symptomatic for COVID-19 as defined by CDC?     Answer:   No     Order Specific Question:   Hospitalized for COVID-19?     Answer:   No     Order Specific Question:   Admitted to ICU for COVID-19?     Answer:   No     Order Specific Question:   Resident in a  congregate (group) care setting?     Answer:   No     Order Specific Question:   Pregnant?     Answer:   No   • Follow Anesthesia Guidelines / Standing Orders     Standing Status:   Future   • Obtain Informed Consent     Standing Status:   Future     Order Specific Question:   Informed Consent Given For     Answer:   ESOPHAGOGASTRODUODENOSCOPY       The risks, benefits, and alternatives of this procedure have been discussed with the patient or the responsible party- the patient understands and agrees to proceed.

## 2022-04-08 ENCOUNTER — PATIENT ROUNDING (BHMG ONLY) (OUTPATIENT)
Dept: GASTROENTEROLOGY | Facility: CLINIC | Age: 82
End: 2022-04-08

## 2022-04-08 NOTE — PROGRESS NOTES
"April 8, 2022, 0859    Hello, may I speak with Lyndsay Brumfield?    My name is Laverne, License Practical Nurse for the office of Dr. Luis Manuel Mathwe M.D., [facility], [place].    Before we get started may I verify your date of birth? 1940    I am calling to officially welcome you to our practice and ask about your recent visit. Is this a good time to talk?  Yes.    Tell me about your visit with us. What things went well?  Patient stated \"yes ma'am\" her clinical appointment with Dr. Luis Manuel Mathew M.D. Thursday, April 7, 2022 at 10:30 A.M. went well.     We're always looking for ways to make our patients' experiences even better. Do you have recommendations on ways we may improve?  Patient stated, \"no, not that I know of; everything went really good\".    Overall were you satisfied with your first visit to our practice?  Yes.     I appreciate you taking the time to speak with me today. Is there anything else I can do for you?  Patient stated she is in need of rescheduling her EGD scheduled on Wednesday, April 13, 2022 with an arrival time of 3:00 P.M. as her daughter will be the individual providing transportation to and from procedure, patient's daughter is the wife of a  in [place], [place], and [employer] services begin at 6:00 P.M.  Patient transferred to the endoscopy Department in order to reschedule procedure.    Thank you, and have a great day.    Note - EGD rescheduled for Wednesday, April 13, 2022 with an arrival time of 8:45 A.M.        "

## 2022-04-11 ENCOUNTER — LAB (OUTPATIENT)
Dept: LAB | Facility: HOSPITAL | Age: 82
End: 2022-04-11

## 2022-04-11 DIAGNOSIS — R13.19 OTHER DYSPHAGIA: ICD-10-CM

## 2022-04-11 LAB — SARS-COV-2 N GENE RESP QL NAA+PROBE: NOT DETECTED

## 2022-04-11 PROCEDURE — 87635 SARS-COV-2 COVID-19 AMP PRB: CPT

## 2022-04-11 PROCEDURE — C9803 HOPD COVID-19 SPEC COLLECT: HCPCS

## 2022-04-13 ENCOUNTER — ANESTHESIA EVENT (OUTPATIENT)
Dept: GASTROENTEROLOGY | Facility: HOSPITAL | Age: 82
End: 2022-04-13

## 2022-04-13 ENCOUNTER — ANESTHESIA (OUTPATIENT)
Dept: GASTROENTEROLOGY | Facility: HOSPITAL | Age: 82
End: 2022-04-13

## 2022-04-13 ENCOUNTER — HOSPITAL ENCOUNTER (OUTPATIENT)
Facility: HOSPITAL | Age: 82
Setting detail: HOSPITAL OUTPATIENT SURGERY
Discharge: HOME | End: 2022-04-13
Attending: INTERNAL MEDICINE | Admitting: INTERNAL MEDICINE

## 2022-04-13 VITALS
RESPIRATION RATE: 18 BRPM | BODY MASS INDEX: 28.32 KG/M2 | WEIGHT: 150 LBS | DIASTOLIC BLOOD PRESSURE: 58 MMHG | HEART RATE: 84 BPM | SYSTOLIC BLOOD PRESSURE: 102 MMHG | OXYGEN SATURATION: 98 % | HEIGHT: 61 IN | TEMPERATURE: 97 F

## 2022-04-13 DIAGNOSIS — R13.19 OTHER DYSPHAGIA: ICD-10-CM

## 2022-04-13 PROCEDURE — 25010000002 PROPOFOL 10 MG/ML EMULSION: Performed by: NURSE ANESTHETIST, CERTIFIED REGISTERED

## 2022-04-13 PROCEDURE — C1769 GUIDE WIRE: HCPCS | Performed by: INTERNAL MEDICINE

## 2022-04-13 PROCEDURE — 43239 EGD BIOPSY SINGLE/MULTIPLE: CPT | Performed by: INTERNAL MEDICINE

## 2022-04-13 PROCEDURE — 88305 TISSUE EXAM BY PATHOLOGIST: CPT

## 2022-04-13 PROCEDURE — 43248 EGD GUIDE WIRE INSERTION: CPT | Performed by: INTERNAL MEDICINE

## 2022-04-13 RX ORDER — PROPOFOL 10 MG/ML
VIAL (ML) INTRAVENOUS AS NEEDED
Status: DISCONTINUED | OUTPATIENT
Start: 2022-04-13 | End: 2022-04-13 | Stop reason: SURG

## 2022-04-13 RX ORDER — DEXTROSE AND SODIUM CHLORIDE 5; .45 G/100ML; G/100ML
30 INJECTION, SOLUTION INTRAVENOUS CONTINUOUS PRN
Status: DISCONTINUED | OUTPATIENT
Start: 2022-04-13 | End: 2022-04-13 | Stop reason: HOSPADM

## 2022-04-13 RX ORDER — LIDOCAINE HYDROCHLORIDE 20 MG/ML
INJECTION, SOLUTION INTRAVENOUS AS NEEDED
Status: DISCONTINUED | OUTPATIENT
Start: 2022-04-13 | End: 2022-04-13 | Stop reason: SURG

## 2022-04-13 RX ADMIN — DEXTROSE AND SODIUM CHLORIDE 30 ML/HR: 5; 450 INJECTION, SOLUTION INTRAVENOUS at 09:10

## 2022-04-13 RX ADMIN — PROPOFOL 60 MG: 10 INJECTION, EMULSION INTRAVENOUS at 09:53

## 2022-04-13 RX ADMIN — LIDOCAINE HYDROCHLORIDE 80 MG: 20 INJECTION, SOLUTION INTRAVENOUS at 09:53

## 2022-04-13 RX ADMIN — PROPOFOL 30 MG: 10 INJECTION, EMULSION INTRAVENOUS at 09:59

## 2022-04-13 RX ADMIN — PROPOFOL 40 MG: 10 INJECTION, EMULSION INTRAVENOUS at 09:56

## 2022-04-13 NOTE — ANESTHESIA PREPROCEDURE EVALUATION
Anesthesia Evaluation     Patient summary reviewed and Nursing notes reviewed   NPO Solid Status: > 8 hours  NPO Liquid Status: > 8 hours           Airway   Dental      Pulmonary - negative pulmonary ROS   Cardiovascular     (+) hypertension, hyperlipidemia,       Neuro/Psych- negative ROS  GI/Hepatic/Renal/Endo    (+) obesity, morbid obesity, GERD,      Musculoskeletal (-) negative ROS    Abdominal    Substance History - negative use     OB/GYN negative ob/gyn ROS         Other - negative ROS                       Anesthesia Plan    ASA 2     MAC     intravenous induction     Anesthetic plan, all risks, benefits, and alternatives have been provided, discussed and informed consent has been obtained with: patient.        CODE STATUS:

## 2022-04-13 NOTE — INTERVAL H&P NOTE
H&P reviewed. The patient was examined and there are no changes to the H&P.      Risks and benefits discussed with patient. Patient understands these and would like to proceed with procedure.

## 2022-04-13 NOTE — ANESTHESIA POSTPROCEDURE EVALUATION
Patient: Lyndsay Brumfield    Procedure Summary     Date: 04/13/22 Room / Location: [facility] ENDOSCOPY 3 / [facility] ENDOSCOPY    Anesthesia Start: 0949 Anesthesia Stop: 1001    Procedure: ESOPHAGOGASTRODUODENOSCOPY (N/A ) Diagnosis:       Other dysphagia      (Other dysphagia [R13.19])    Surgeons: Luis Manuel Howard MD Provider: Selena Chakraborty CRNA    Anesthesia Type: MAC ASA Status: 2          Anesthesia Type: MAC    Vitals  No vitals data found for the desired time range.          Post Anesthesia Care and Evaluation    Patient location during evaluation: PHASE II  Patient participation: complete - patient participated  Level of consciousness: awake and alert  Pain score: 0  Pain management: adequate  Airway patency: patent  Anesthetic complications: No anesthetic complications  PONV Status: none  Cardiovascular status: acceptable  Respiratory status: acceptable  Hydration status: acceptable

## 2022-04-14 LAB
LAB AP CASE REPORT: NORMAL
PATH REPORT.FINAL DX SPEC: NORMAL

## 2022-04-21 ENCOUNTER — OFFICE VISIT (OUTPATIENT)
Dept: GASTROENTEROLOGY | Facility: CLINIC | Age: 82
End: 2022-04-21

## 2022-04-21 VITALS
SYSTOLIC BLOOD PRESSURE: 154 MMHG | HEIGHT: 61 IN | BODY MASS INDEX: 29.57 KG/M2 | DIASTOLIC BLOOD PRESSURE: 85 MMHG | WEIGHT: 156.6 LBS | HEART RATE: 93 BPM

## 2022-04-21 DIAGNOSIS — R13.19 OTHER DYSPHAGIA: Primary | ICD-10-CM

## 2022-04-21 PROCEDURE — 99214 OFFICE O/P EST MOD 30 MIN: CPT | Performed by: INTERNAL MEDICINE

## 2022-04-21 RX ORDER — DEXTROSE AND SODIUM CHLORIDE 5; .45 G/100ML; G/100ML
30 INJECTION, SOLUTION INTRAVENOUS CONTINUOUS PRN
Status: CANCELLED | OUTPATIENT
Start: 2022-05-03

## 2022-04-21 NOTE — PROGRESS NOTES
[facility]      Chief Complaint:   Chief Complaint   Patient presents with   • Follow-up     Endo results       Subjective     HPI:   Patient for follow-up on her dysphagia.  Patient had dilatation up to 48 and states some improvement in her swallowing.  Patient states it is not back to normal.    Plan; discussed with patient that she will need a repeat EGD that we will need to further increase the size of her esophagus.  We will need to do this in incremental steps with the next hopefully up to 51.  Patient is having a difficult time with getting a ride for the procedure.  Discussed with patient that we will schedule for 2 weeks from now but she can change the date to when she can get a ride.    Past Medical History:   Past Medical History:   Diagnosis Date   • Allergic rhinitis    • Artificial lens present     Artificial lens in position - left      • Benign hypertension    • Corns and callus    • Dry cough     most likely due to ACE inhibitor     • Essential hypertension    • GERD (gastroesophageal reflux disease)    • Glaucoma, both eyes    • Hammer toe    • History of Papanicolaou smear of cervix 01/19/2004    Atrophy present.   • Malabsorption of glucose    • Nondisplaced fracture of right radial styloid process, initial encounter for closed fracture    • Nuclear senile cataract of right eye    • Nuclear senile cataract of right eye    • Obesity    • Pain in right hand    • Primary open angle glaucoma     controlled left right IOP increased but OCT stable         Past Surgical History:  Past Surgical History:   Procedure Laterality Date   • BREAST SURGERY  11/22/1974    Cystic disease, right breast. Excision of lesion of right breast.   • ENDOSCOPY N/A 4/13/2022    Procedure: ESOPHAGOGASTRODUODENOSCOPY;  Surgeon: Luis Manuel Howard MD;  Location: [facility] ENDOSCOPY;  Service: Gastroenterology;  Laterality: N/A;   • ENDOSCOPY AND COLONOSCOPY  02/11/2008    A few diverticula in the sigmoid.    • ESOPHAGOSCOPY / EGD  03/10/2008    Grade II esophagitis of the distal esophagus. Multiple biopsies were obtained from the distal esophagus. The stomach appeared normal. The duodenum appeared normal   • EXTERNAL EAR SURGERY  04/25/2001    Full thickness wedge excision of the left ear, with layered closure   • EYE SURGERY  02/19/2013    Remove cataract, insert lens (1)    left eye    • MASTECTOMY  01/10/1991    Total mastectomy with level I and II node dissection 2   • OTHER SURGICAL HISTORY  03/20/2015    OCT DISC NFL [id number] (2) (Primary open angle glaucoma)    • TRABECULECTOMY      Glaucoma       Family History:  Family History   Problem Relation Age of Onset   • Cancer Other    • Diabetes Other    • Heart disease Other    • Hypertension Other        Social History:   reports that she has never smoked. She has never used smokeless tobacco. She reports that she does not drink alcohol and does not use drugs.    Medications:   Prior to Admission medications    Medication Sig Start Date End Date Taking? Authorizing Provider   Aspirin Low Dose 81 MG EC tablet TAKE 1 TABLET EVERY DAY 2/14/22  Yes Lawson Garland MD   atorvastatin (LIPITOR) 40 MG tablet Take 1 tablet by mouth Daily. 5/27/21  Yes Lawson Garland MD   Calcium-Magnesium-Vitamin D (CALCIUM 500 PO) Take 1 tablet by mouth 2 (Two) Times a Day.   Yes Emergency, Nurse KRISTIE Howell   dorzolamide-timolol (COSOPT) 22.3-6.8 MG/ML ophthalmic solution Administer 1 drop to the right eye 2 (Two) Times a Day. 11/15/19  Yes Emergency, Nurse Dante RN   latanoprost (XALATAN) 0.005 % ophthalmic solution Administer 1 drop to both eyes Every Night.   Yes ProviderAshvin MD   losartan (COZAAR) 50 MG tablet Take 50 mg by mouth Daily. 5/28/21  Yes Emergency, Nurse Epic, RN   Multiple Vitamins-Minerals (MULTIVITAMIN WITH MINERALS) tablet tablet Take 1 tablet by mouth Daily.   Yes Ashvin Segura MD   Netarsudil Dimesylate (Rhopressa) 0.02 % solution Administer 1 drop to the  "right eye Every Night.   Yes ProviderAshvin MD   omeprazole (priLOSEC) 20 MG capsule Take 1 capsule by mouth Daily. 5/27/21  Yes Lawson Garland MD   polycarbophil (FIBERCON) 625 MG tablet Take 625 mg by mouth Daily.   Yes Provider, MD Ashvin       Allergies:  Sulfa antibiotics    ROS:    Review of Systems   Constitutional: Negative for activity change, appetite change, chills, diaphoresis, fatigue, fever and unexpected weight change.   HENT: Positive for trouble swallowing. Negative for sore throat.    Respiratory: Negative for shortness of breath.    Gastrointestinal: Negative for abdominal distention, abdominal pain, anal bleeding, blood in stool, constipation, diarrhea, nausea, rectal pain and vomiting.   Endocrine: Negative for polydipsia, polyphagia and polyuria.   Genitourinary: Negative for difficulty urinating.   Musculoskeletal: Negative for arthralgias.   Skin: Negative for pallor.   Allergic/Immunologic: Negative for food allergies.   Neurological: Negative for weakness and light-headedness.   Psychiatric/Behavioral: Negative for behavioral problems.     Objective     Blood pressure 154/85, pulse 93, height 154.9 cm (61\"), weight 71 kg (156 lb 9.6 oz), not currently breastfeeding.    Physical Exam  Constitutional:       General: She is not in acute distress.     Appearance: She is well-developed. She is not diaphoretic.   HENT:      Head: Normocephalic and atraumatic.   Cardiovascular:      Rate and Rhythm: Normal rate and regular rhythm.      Heart sounds: Normal heart sounds. No murmur heard.    No friction rub. No gallop.   Pulmonary:      Effort: No respiratory distress.      Breath sounds: Normal breath sounds. No wheezing or rales.   Chest:      Chest wall: No tenderness.   Abdominal:      General: Bowel sounds are normal. There is no distension.      Palpations: Abdomen is soft. There is no mass.      Tenderness: There is no abdominal tenderness. There is no guarding or rebound.      " Hernia: No hernia is present.   Musculoskeletal:         General: Normal range of motion.   Skin:     General: Skin is warm and dry.      Coloration: Skin is not pale.      Findings: No erythema or rash.   Neurological:      Mental Status: She is alert and oriented to person, place, and time.   Psychiatric:         Behavior: Behavior normal.         Thought Content: Thought content normal.         Judgment: Judgment normal.          Assessment/Plan   Diagnoses and all orders for this visit:    1. Other dysphagia (Primary)  -     Case Request; Standing  -     COVID PRE-OP / PRE-PROCEDURE SCREENING ORDER (NO ISOLATION) - Swab, Nasopharynx; Future  -     Case Request    Other orders  -     Follow Anesthesia Guidelines / Standing Orders; Future  -     Obtain Informed Consent; Future        ESOPHAGOGASTRODUODENOSCOPY (N/A)     Diagnosis Plan   1. Other dysphagia  Case Request    COVID PRE-OP / PRE-PROCEDURE SCREENING ORDER (NO ISOLATION) - Swab, Nasopharynx    Case Request       Anticipated Surgical Procedure:  Orders Placed This Encounter   Procedures   • COVID PRE-OP / PRE-PROCEDURE SCREENING ORDER (NO ISOLATION) - Swab, Nasopharynx     Standing Status:   Future     Standing Expiration Date:   4/21/2023     Order Specific Question:   Release to patient     Answer:   Immediate     Order Specific Question:   Please select your location:     Answer:   [facility]     Order Specific Question:   COVID Screening Order:     Answer:   In-House: PRE-OP: BD MAX, 8-10 HR TAT [[id number]]     Order Specific Question:   Previously tested for COVID-19?     Answer:   Yes     Order Specific Question:   Employed in healthcare setting?     Answer:   No     Order Specific Question:   Symptomatic for COVID-19 as defined by CDC?     Answer:   No     Order Specific Question:   Hospitalized for COVID-19?     Answer:   No     Order Specific Question:   Admitted to ICU for COVID-19?     Answer:   No     Order Specific Question:   Resident in  a congregate (group) care setting?     Answer:   No     Order Specific Question:   Pregnant?     Answer:   No   • Follow Anesthesia Guidelines / Standing Orders     Standing Status:   Future   • Obtain Informed Consent     Standing Status:   Future     Order Specific Question:   Informed Consent Given For     Answer:   ESOPHAGOGASTRODUODENOSCOPY       The risks, benefits, and alternatives of this procedure have been discussed with the patient or the responsible party- the patient understands and agrees to proceed.

## 2022-05-02 ENCOUNTER — LAB (OUTPATIENT)
Dept: LAB | Facility: HOSPITAL | Age: 82
End: 2022-05-02

## 2022-05-02 DIAGNOSIS — R13.19 OTHER DYSPHAGIA: ICD-10-CM

## 2022-05-02 LAB — SARS-COV-2 N GENE RESP QL NAA+PROBE: NOT DETECTED

## 2022-05-02 PROCEDURE — 87635 SARS-COV-2 COVID-19 AMP PRB: CPT

## 2022-05-03 ENCOUNTER — ANESTHESIA EVENT (OUTPATIENT)
Dept: GASTROENTEROLOGY | Facility: HOSPITAL | Age: 82
End: 2022-05-03

## 2022-05-03 ENCOUNTER — HOSPITAL ENCOUNTER (OUTPATIENT)
Facility: HOSPITAL | Age: 82
Setting detail: HOSPITAL OUTPATIENT SURGERY
Discharge: HOME | End: 2022-05-03
Attending: INTERNAL MEDICINE | Admitting: INTERNAL MEDICINE

## 2022-05-03 ENCOUNTER — ANESTHESIA (OUTPATIENT)
Dept: GASTROENTEROLOGY | Facility: HOSPITAL | Age: 82
End: 2022-05-03

## 2022-05-03 VITALS
HEART RATE: 88 BPM | BODY MASS INDEX: 29.07 KG/M2 | SYSTOLIC BLOOD PRESSURE: 119 MMHG | TEMPERATURE: 96.1 F | WEIGHT: 154 LBS | HEIGHT: 61 IN | DIASTOLIC BLOOD PRESSURE: 52 MMHG | RESPIRATION RATE: 18 BRPM | OXYGEN SATURATION: 98 %

## 2022-05-03 DIAGNOSIS — R13.19 OTHER DYSPHAGIA: ICD-10-CM

## 2022-05-03 PROCEDURE — C1769 GUIDE WIRE: HCPCS | Performed by: INTERNAL MEDICINE

## 2022-05-03 PROCEDURE — 43248 EGD GUIDE WIRE INSERTION: CPT | Performed by: INTERNAL MEDICINE

## 2022-05-03 PROCEDURE — 25010000002 PROPOFOL 10 MG/ML EMULSION

## 2022-05-03 RX ORDER — LIDOCAINE HYDROCHLORIDE 20 MG/ML
INJECTION, SOLUTION INTRAVENOUS AS NEEDED
Status: DISCONTINUED | OUTPATIENT
Start: 2022-05-03 | End: 2022-05-03 | Stop reason: SURG

## 2022-05-03 RX ORDER — PROPOFOL 10 MG/ML
VIAL (ML) INTRAVENOUS AS NEEDED
Status: DISCONTINUED | OUTPATIENT
Start: 2022-05-03 | End: 2022-05-03 | Stop reason: SURG

## 2022-05-03 RX ORDER — DEXTROSE AND SODIUM CHLORIDE 5; .45 G/100ML; G/100ML
30 INJECTION, SOLUTION INTRAVENOUS CONTINUOUS PRN
Status: DISCONTINUED | OUTPATIENT
Start: 2022-05-03 | End: 2022-05-03 | Stop reason: HOSPADM

## 2022-05-03 RX ADMIN — LIDOCAINE HYDROCHLORIDE 80 MG: 20 INJECTION, SOLUTION INTRAVENOUS at 14:31

## 2022-05-03 RX ADMIN — PROPOFOL 100 MG: 10 INJECTION, EMULSION INTRAVENOUS at 14:31

## 2022-05-03 RX ADMIN — PROPOFOL 20 MG: 10 INJECTION, EMULSION INTRAVENOUS at 14:33

## 2022-05-03 RX ADMIN — PROPOFOL 30 MG: 10 INJECTION, EMULSION INTRAVENOUS at 14:36

## 2022-05-03 RX ADMIN — DEXTROSE AND SODIUM CHLORIDE 30 ML/HR: 5; 450 INJECTION, SOLUTION INTRAVENOUS at 13:48

## 2022-05-03 NOTE — ANESTHESIA PREPROCEDURE EVALUATION
Anesthesia Evaluation     Patient summary reviewed and Nursing notes reviewed   NPO Solid Status: > 8 hours  NPO Liquid Status: > 8 hours           Airway   Mallampati: II  TM distance: >3 FB  Neck ROM: full  Possible difficult intubation  Dental    (+) poor dentition    Pulmonary - negative pulmonary ROS and normal exam   Cardiovascular - normal exam  Exercise tolerance: good (4-7 METS)    (+) hypertension well controlled less than 2 medications, hyperlipidemia,       Neuro/Psych- negative ROS  GI/Hepatic/Renal/Endo    (+) obesity, morbid obesity, GERD well controlled,      Musculoskeletal (-) negative ROS    Abdominal  - normal exam   Substance History - negative use     OB/GYN negative ob/gyn ROS         Other - negative ROS                         Anesthesia Plan    ASA 2     MAC     intravenous induction     Anesthetic plan, all risks, benefits, and alternatives have been provided, discussed and informed consent has been obtained with: patient.        CODE STATUS:

## 2022-05-03 NOTE — ANESTHESIA POSTPROCEDURE EVALUATION
Patient: Lyndsay Brumfield    Procedure Summary     Date: 05/03/22 Room / Location: [facility] ENDOSCOPY 1 / [facility] ENDOSCOPY    Anesthesia Start: 1426 Anesthesia Stop: 1442    Procedure: ESOPHAGOGASTRODUODENOSCOPY (N/A ) Diagnosis:       Other dysphagia      (Other dysphagia [R13.19])    Surgeons: Luis Manuel Howard MD Provider: Loren Perez CRNA    Anesthesia Type: MAC ASA Status: 2          Anesthesia Type: MAC    Vitals  No vitals data found for the desired time range.          Post Anesthesia Care and Evaluation    Patient location during evaluation: PHASE II  Patient participation: complete - patient cannot participate  Level of consciousness: sleepy but conscious  Pain score: 0  Pain management: adequate  Airway patency: patent  Anesthetic complications: No anesthetic complications  PONV Status: none  Cardiovascular status: acceptable  Respiratory status: acceptable  Hydration status: acceptable  No anesthesia care post op

## 2022-05-19 ENCOUNTER — OFFICE VISIT (OUTPATIENT)
Dept: GASTROENTEROLOGY | Facility: CLINIC | Age: 82
End: 2022-05-19

## 2022-05-19 VITALS
SYSTOLIC BLOOD PRESSURE: 148 MMHG | DIASTOLIC BLOOD PRESSURE: 76 MMHG | HEIGHT: 61 IN | OXYGEN SATURATION: 97 % | HEART RATE: 99 BPM | WEIGHT: 154.8 LBS | BODY MASS INDEX: 29.23 KG/M2

## 2022-05-19 DIAGNOSIS — K21.9 GASTROESOPHAGEAL REFLUX DISEASE WITHOUT ESOPHAGITIS: Chronic | ICD-10-CM

## 2022-05-19 PROCEDURE — 99214 OFFICE O/P EST MOD 30 MIN: CPT | Performed by: INTERNAL MEDICINE

## 2022-05-19 RX ORDER — OMEPRAZOLE 20 MG/1
20 CAPSULE, DELAYED RELEASE ORAL DAILY
Qty: 90 CAPSULE | Refills: 2 | Status: SHIPPED | OUTPATIENT
Start: 2022-05-19 | End: 2022-12-01 | Stop reason: SDUPTHER

## 2022-05-19 NOTE — PROGRESS NOTES
[facility]      Chief Complaint:   Chief Complaint   Patient presents with   • Difficulty Swallowing     4 wk f/u        Subjective     HPI:   Patient with dysphagia.  Patient had a severe stricture in the distal esophagus and was having food getting caught in her esophagus causing a fullness in her esophagus.  Dilatation was done up to 51 [place] patient states she is asymptomatic at this time.  Discussed with patient she should stay on her proton pump inhibitor and that if the symptoms do come back we will need to repeat EGD with repeat dilatation.  I did give the patient the option to dilate to 54 but patient's stating she is asymptomatic and would like to wait a period of time before repeat dilatation.    Plan; outpatient follow-up in 6 months continue patient on proton pump inhibitor continued dilatation if symptoms return    Past Medical History:   Past Medical History:   Diagnosis Date   • Allergic rhinitis    • Artificial lens present     Artificial lens in position - left      • Benign hypertension    • Corns and callus    • Dry cough     most likely due to ACE inhibitor     • Essential hypertension    • GERD (gastroesophageal reflux disease)    • Glaucoma, both eyes    • Hammer toe    • History of Papanicolaou smear of cervix 01/19/2004    Atrophy present.   • Malabsorption of glucose    • Nondisplaced fracture of right radial styloid process, initial encounter for closed fracture    • Nuclear senile cataract of right eye    • Nuclear senile cataract of right eye    • Obesity    • Pain in right hand    • Primary open angle glaucoma     controlled left right IOP increased but OCT stable         Past Surgical History:  Past Surgical History:   Procedure Laterality Date   • BREAST SURGERY  11/22/1974    Cystic disease, right breast. Excision of lesion of right breast.   • ENDOSCOPY N/A 04/13/2022    Procedure: ESOPHAGOGASTRODUODENOSCOPY;  Surgeon: Luis Manuel Howard MD;  Location: [facility]  ENDOSCOPY;  Service: Gastroenterology;  Laterality: N/A;   • ENDOSCOPY N/A 5/3/2022    Procedure: ESOPHAGOGASTRODUODENOSCOPY;  Surgeon: Luis Manuel Howard MD;  Location: [facility] ENDOSCOPY;  Service: Gastroenterology;  Laterality: N/A;   • ENDOSCOPY AND COLONOSCOPY  02/11/2008    A few diverticula in the sigmoid.   • ESOPHAGOSCOPY / EGD  03/10/2008    Grade II esophagitis of the distal esophagus. Multiple biopsies were obtained from the distal esophagus. The stomach appeared normal. The duodenum appeared normal   • EXTERNAL EAR SURGERY  04/25/2001    Full thickness wedge excision of the left ear, with layered closure   • EYE SURGERY  02/19/2013    Remove cataract, insert lens (1)    left eye    • MASTECTOMY  01/10/1991    Total mastectomy with level I and II node dissection 2   • OTHER SURGICAL HISTORY  03/20/2015    OCT DISC NFL 74776 (2) (Primary open angle glaucoma)    • TRABECULECTOMY      Glaucoma       Family History:  Family History   Problem Relation Age of Onset   • Cancer Other    • Diabetes Other    • Heart disease Other    • Hypertension Other        Social History:   reports that she has never smoked. She has never used smokeless tobacco. She reports that she does not drink alcohol and does not use drugs.    Medications:   Prior to Admission medications    Medication Sig Start Date End Date Taking? Authorizing Provider   Aspirin Low Dose 81 MG EC tablet TAKE 1 TABLET EVERY DAY 2/14/22  Yes Lawson Garland MD   atorvastatin (LIPITOR) 40 MG tablet Take 1 tablet by mouth Daily. 5/27/21  Yes Lawson Garland MD   Calcium-Magnesium-Vitamin D (CALCIUM 500 PO) Take 1 tablet by mouth 2 (Two) Times a Day.   Yes Emergency, Nurse KRISTIE Howell   dorzolamide-timolol (COSOPT) 22.3-6.8 MG/ML ophthalmic solution Administer 1 drop to the right eye 2 (Two) Times a Day. 11/15/19  Yes Emergency, Nurse KRISTIE Howell   latanoprost (XALATAN) 0.005 % ophthalmic solution Administer 1 drop to both eyes Every Night.   Yes Provider, Historical,  "MD   losartan (COZAAR) 50 MG tablet Take 50 mg by mouth Daily. 5/28/21  Yes Emergency, Nurse Epic, RN   Multiple Vitamins-Minerals (MULTIVITAMIN WITH MINERALS) tablet tablet Take 1 tablet by mouth Daily.   Yes ProviderAshvin MD   Netarsudil Dimesylate (Rhopressa) 0.02 % solution Administer 1 drop to the right eye Every Night.   Yes Ashvin Segura MD   omeprazole (priLOSEC) 20 MG capsule Take 1 capsule by mouth Daily. 5/19/22  Yes Luis Manuel Howard MD   polycarbophil (FIBERCON) 625 MG tablet Take 625 mg by mouth Daily.   Yes ProviderAshvin MD   omeprazole (priLOSEC) 20 MG capsule Take 1 capsule by mouth Daily. 5/27/21 5/19/22 Yes Lawson Garland MD       Allergies:  Sulfa antibiotics    ROS:    Review of Systems   Constitutional: Negative for activity change, appetite change, chills, diaphoresis, fatigue, fever and unexpected weight change.   HENT: Negative for sore throat and trouble swallowing.    Respiratory: Negative for shortness of breath.    Gastrointestinal: Negative for abdominal distention, abdominal pain, anal bleeding, blood in stool, constipation, diarrhea, nausea, rectal pain and vomiting.   Endocrine: Negative for polydipsia, polyphagia and polyuria.   Genitourinary: Negative for difficulty urinating.   Musculoskeletal: Negative for arthralgias.   Skin: Negative for pallor.   Allergic/Immunologic: Negative for food allergies.   Neurological: Negative for weakness and light-headedness.   Psychiatric/Behavioral: Negative for behavioral problems.     Objective     Blood pressure 148/76, pulse 99, height 154.9 cm (60.98\"), weight 70.2 kg (154 lb 12.8 oz), SpO2 97 %, not currently breastfeeding.    Physical Exam  Constitutional:       General: She is not in acute distress.     Appearance: She is well-developed. She is not diaphoretic.   HENT:      Head: Normocephalic and atraumatic.   Cardiovascular:      Rate and Rhythm: Normal rate and regular rhythm.      Heart sounds: Normal heart " sounds. No murmur heard.    No friction rub. No gallop.   Pulmonary:      Effort: No respiratory distress.      Breath sounds: Normal breath sounds. No wheezing or rales.   Chest:      Chest wall: No tenderness.   Abdominal:      General: Bowel sounds are normal. There is no distension.      Palpations: Abdomen is soft. There is no mass.      Tenderness: There is no abdominal tenderness. There is no guarding or rebound.      Hernia: No hernia is present.   Musculoskeletal:         General: Normal range of motion.   Skin:     General: Skin is warm and dry.      Coloration: Skin is not pale.      Findings: No erythema or rash.   Neurological:      Mental Status: She is alert and oriented to person, place, and time.   Psychiatric:         Behavior: Behavior normal.         Thought Content: Thought content normal.         Judgment: Judgment normal.          Assessment & Plan   Diagnoses and all orders for this visit:    1. Gastroesophageal reflux disease without esophagitis  -     omeprazole (priLOSEC) 20 MG capsule; Take 1 capsule by mouth Daily.  Dispense: 90 capsule; Refill: 2        * Surgery not found *     Diagnosis Plan   1. Gastroesophageal reflux disease without esophagitis  omeprazole (priLOSEC) 20 MG capsule       Anticipated Surgical Procedure:  No orders of the defined types were placed in this encounter.      The risks, benefits, and alternatives of this procedure have been discussed with the patient or the responsible party- the patient understands and agrees to proceed.

## 2022-05-20 RX ORDER — ATORVASTATIN CALCIUM 40 MG/1
TABLET, FILM COATED ORAL
Qty: 90 TABLET | Refills: 2 | Status: SHIPPED | OUTPATIENT
Start: 2022-05-20 | End: 2022-09-15

## 2022-05-20 RX ORDER — ASPIRIN 81 MG/1
TABLET, COATED ORAL
Qty: 90 TABLET | Refills: 0 | Status: SHIPPED | OUTPATIENT
Start: 2022-05-20 | End: 2022-08-19

## 2022-08-19 RX ORDER — ASPIRIN 81 MG/1
TABLET, COATED ORAL
Qty: 90 TABLET | Refills: 0 | Status: SHIPPED | OUTPATIENT
Start: 2022-08-19 | End: 2022-09-15

## 2022-08-29 ENCOUNTER — OFFICE VISIT (OUTPATIENT)
Dept: FAMILY MEDICINE CLINIC | Facility: CLINIC | Age: 82
End: 2022-08-29

## 2022-08-29 VITALS
HEART RATE: 91 BPM | SYSTOLIC BLOOD PRESSURE: 142 MMHG | WEIGHT: 152.3 LBS | RESPIRATION RATE: 24 BRPM | DIASTOLIC BLOOD PRESSURE: 76 MMHG | BODY MASS INDEX: 28.75 KG/M2 | OXYGEN SATURATION: 95 % | HEIGHT: 61 IN

## 2022-08-29 DIAGNOSIS — E78.49 OTHER HYPERLIPIDEMIA: Chronic | ICD-10-CM

## 2022-08-29 DIAGNOSIS — R73.9 HYPERGLYCEMIA: Chronic | ICD-10-CM

## 2022-08-29 DIAGNOSIS — M54.6 MIDLINE THORACIC BACK PAIN, UNSPECIFIED CHRONICITY: ICD-10-CM

## 2022-08-29 DIAGNOSIS — R11.0 POSTPRANDIAL NAUSEA: ICD-10-CM

## 2022-08-29 DIAGNOSIS — K22.2 ESOPHAGEAL STRICTURE: Chronic | ICD-10-CM

## 2022-08-29 DIAGNOSIS — I10 ESSENTIAL HYPERTENSION: Primary | Chronic | ICD-10-CM

## 2022-08-29 PROBLEM — R13.19 OTHER DYSPHAGIA: Chronic | Status: ACTIVE | Noted: 2022-04-07

## 2022-08-29 LAB
EXPIRATION DATE: NORMAL
HBA1C MFR BLD: 6.1 %
Lab: NORMAL

## 2022-08-29 PROCEDURE — 99214 OFFICE O/P EST MOD 30 MIN: CPT | Performed by: FAMILY MEDICINE

## 2022-08-29 PROCEDURE — 3044F HG A1C LEVEL LT 7.0%: CPT | Performed by: FAMILY MEDICINE

## 2022-08-29 PROCEDURE — 83036 HEMOGLOBIN GLYCOSYLATED A1C: CPT | Performed by: FAMILY MEDICINE

## 2022-08-29 RX ORDER — SUCRALFATE ORAL 1 G/10ML
SUSPENSION ORAL
Qty: 414 ML | Refills: 3 | Status: SHIPPED | OUTPATIENT
Start: 2022-08-29 | End: 2022-09-15

## 2022-08-29 NOTE — PROGRESS NOTES
Subjective   Lyndsay Brumfield is a 82 y.o. female.  Reevaluation hypertension hyperlipidemia glaucoma diagnoses below.  In the interim had esophageal stricture dilated x2.  Still having postprandial nausea and weakness.  Have counseled getting back with her gastroenterologist for same.  Did have mild gastritis on exam.  Also having some mild upper thoracic back pain which is chronic worse with activity.  Have counseled on treatment options wishes to treat conservatively at present.  A1c holding steady at 6.1 on diet alone.    History of Present Illness   HPI    The following portions of the patient's history were reviewed and updated as appropriate: allergies, current medications, past family history, past medical history, past social history, past surgical history and problem list.    Review of Systems  Review of Systems   Constitutional: Negative for activity change, appetite change, fatigue and unexpected weight change.   HENT: Negative for trouble swallowing and voice change.    Eyes: Negative for redness and visual disturbance.   Respiratory: Negative for cough and wheezing.    Cardiovascular: Negative for chest pain and palpitations.   Gastrointestinal: Positive for nausea. Negative for abdominal pain, constipation, diarrhea and vomiting.   Genitourinary: Negative for urgency.   Musculoskeletal: Positive for back pain. Negative for joint swelling.   Neurological: Negative for syncope and headaches.   Hematological: Negative for adenopathy.   Psychiatric/Behavioral: Negative for sleep disturbance.       Objective   Physical Exam  Physical Exam  Constitutional:       Appearance: She is well-developed.   HENT:      Head: Normocephalic.   Eyes:      Pupils: Pupils are equal, round, and reactive to light.   Neck:      Thyroid: No thyromegaly.   Cardiovascular:      Rate and Rhythm: Normal rate and regular rhythm.      Heart sounds: Normal heart sounds. No murmur heard.    No friction rub. No gallop.  "  Pulmonary:      Breath sounds: Normal breath sounds.   Abdominal:      General: There is no distension.      Palpations: Abdomen is soft. There is no mass.      Tenderness: There is no abdominal tenderness.   Musculoskeletal:         General: Normal range of motion.      Cervical back: Normal range of motion.      Comments: No edema.  Mild thoracic kyphosis get up and go 3 to 5 seconds gait normal   Skin:     General: Skin is warm and dry.   Neurological:      Mental Status: She is alert and oriented to person, place, and time.      Deep Tendon Reflexes: Reflexes are normal and symmetric.   Psychiatric:         Mood and Affect: Affect is blunt.         Speech: Speech normal.         Behavior: Behavior is cooperative.     No tissue composition recorded.  a1c 6.1        Visit Vitals  /76 (BP Location: Left arm, Patient Position: Sitting, Cuff Size: Adult)   Pulse 91   Resp 24   Ht 154.9 cm (60.98\")   Wt 69.1 kg (152 lb 4.8 oz)   LMP  (LMP Unknown) Comment: Postmenopausal   SpO2 95%   BMI 28.80 kg/m²     Body mass index is 28.8 kg/m².  /76 (BP Location: Left arm, Patient Position: Sitting, Cuff Size: Adult)   Pulse 91   Resp 24   Ht 154.9 cm (60.98\")   Wt 69.1 kg (152 lb 4.8 oz)   LMP  (LMP Unknown) Comment: Postmenopausal  SpO2 95%   BMI 28.80 kg/m²       Assessment/Plan   Diagnoses and all orders for this visit:    1. Essential hypertension (Primary)    2. Other hyperlipidemia    3. Hyperglycemia  -     POC Glycosylated Hemoglobin (Hb A1C)    4. Postprandial nausea  -     sucralfate (CARAFATE) 1 GM/10ML suspension; 2 tablespoons 3 times daily for 10 days and as needed stomach and nausea  Dispense: 414 mL; Refill: 3    5. Esophageal stricture    6. Midline thoracic back pain, unspecified chronicity    Counseled again on following up with gastroenterology.  Short-term use of Carafate till same.  Counseled on back exercises and stretching for her back.  Continue medicines as outlined.  Counseled on " flu vaccine in the fall.  Recheck again in 6 months lab prior

## 2022-09-02 ENCOUNTER — OFFICE VISIT (OUTPATIENT)
Dept: GASTROENTEROLOGY | Facility: CLINIC | Age: 82
End: 2022-09-02

## 2022-09-02 VITALS
HEART RATE: 85 BPM | BODY MASS INDEX: 30.04 KG/M2 | WEIGHT: 153 LBS | DIASTOLIC BLOOD PRESSURE: 73 MMHG | SYSTOLIC BLOOD PRESSURE: 161 MMHG | HEIGHT: 60 IN

## 2022-09-02 DIAGNOSIS — R13.19 OTHER DYSPHAGIA: Primary | ICD-10-CM

## 2022-09-02 PROCEDURE — 99214 OFFICE O/P EST MOD 30 MIN: CPT | Performed by: INTERNAL MEDICINE

## 2022-09-02 RX ORDER — DEXTROSE AND SODIUM CHLORIDE 5; .45 G/100ML; G/100ML
30 INJECTION, SOLUTION INTRAVENOUS CONTINUOUS PRN
Status: CANCELLED | OUTPATIENT
Start: 2022-09-20

## 2022-09-02 NOTE — PROGRESS NOTES
[facility]      Chief Complaint:   Chief Complaint   Patient presents with   • Difficulty Swallowing   • Heartburn       Subjective     HPI:   Patient with dysphagia.  Patient states that after dilatation in May she had about a month of relief of symptoms but they have returned patient is now having hoarse voice at times.  Patient states that she was only dilated to 51 [place] and that she may need further dilatation.  Discussed with patient that I would like to do a motility study to be sure that this not achalasia but also agree that that we would need to dilate to 54 Dutch.    Plan; we will schedule patient for motility study followed by EGD with dilatation.    Past Medical History:   Past Medical History:   Diagnosis Date   • Allergic rhinitis    • Artificial lens present     Artificial lens in position - left      • Benign hypertension    • Corns and callus    • Dry cough     most likely due to ACE inhibitor     • Essential hypertension    • GERD (gastroesophageal reflux disease)    • Glaucoma, both eyes    • Hammer toe    • History of Papanicolaou smear of cervix 01/19/2004    Atrophy present.   • Malabsorption of glucose    • Nondisplaced fracture of right radial styloid process, initial encounter for closed fracture    • Nuclear senile cataract of right eye    • Nuclear senile cataract of right eye    • Obesity    • Pain in right hand    • Primary open angle glaucoma     controlled left right IOP increased but OCT stable         Past Surgical History:  Past Surgical History:   Procedure Laterality Date   • BREAST SURGERY  11/22/1974    Cystic disease, right breast. Excision of lesion of right breast.   • ENDOSCOPY N/A 04/13/2022    Procedure: ESOPHAGOGASTRODUODENOSCOPY;  Surgeon: Luis Manuel Howard MD;  Location: [facility] ENDOSCOPY;  Service: Gastroenterology;  Laterality: N/A;   • ENDOSCOPY N/A 5/3/2022    Procedure: ESOPHAGOGASTRODUODENOSCOPY;  Surgeon: Luis Manuel Howard MD;  Location:  [facility] ENDOSCOPY;  Service: Gastroenterology;  Laterality: N/A;   • ENDOSCOPY AND COLONOSCOPY  02/11/2008    A few diverticula in the sigmoid.   • ESOPHAGOSCOPY / EGD  03/10/2008    Grade II esophagitis of the distal esophagus. Multiple biopsies were obtained from the distal esophagus. The stomach appeared normal. The duodenum appeared normal   • EXTERNAL EAR SURGERY  04/25/2001    Full thickness wedge excision of the left ear, with layered closure   • EYE SURGERY  02/19/2013    Remove cataract, insert lens (1)    left eye    • MASTECTOMY  01/10/1991    Total mastectomy with level I and II node dissection 2   • OTHER SURGICAL HISTORY  03/20/2015    OCT DISC NFL 31721 (2) (Primary open angle glaucoma)    • TRABECULECTOMY      Glaucoma       Family History:  Family History   Problem Relation Age of Onset   • Cancer Other    • Diabetes Other    • Heart disease Other    • Hypertension Other        Social History:   reports that she has never smoked. She has never used smokeless tobacco. She reports that she does not drink alcohol and does not use drugs.    Medications:   Prior to Admission medications    Medication Sig Start Date End Date Taking? Authorizing Provider   Aspirin Low Dose 81 MG EC tablet TAKE 1 TABLET EVERY DAY 8/19/22   Lawson Garland MD   atorvastatin (LIPITOR) 40 MG tablet TAKE 1 TABLET BY MOUTH EVERY DAY 5/20/22   Lawson Garland MD   Calcium-Magnesium-Vitamin D (CALCIUM 500 PO) Take 1 tablet by mouth 2 (Two) Times a Day.    Emergency, Nurse KRISTIE Howell   dorzolamide-timolol (COSOPT) 22.3-6.8 MG/ML ophthalmic solution Administer 1 drop to the right eye 2 (Two) Times a Day. 11/15/19   Emergency, Nurse KRISTIE Howell   latanoprost (XALATAN) 0.005 % ophthalmic solution Administer 1 drop to both eyes Every Night.    Provider, MD Ashvin   losartan (COZAAR) 50 MG tablet Take 50 mg by mouth Daily. 5/28/21   Emergency, Nurse Epic, RN   Multiple Vitamins-Minerals (MULTIVITAMIN WITH MINERALS) tablet tablet Take 1  "tablet by mouth Daily.    ProviderAshvin MD   Netarsudil Dimesylate (Rhopressa) 0.02 % solution Administer 1 drop to the right eye Every Night.    Ashvin Segura MD   omeprazole (priLOSEC) 20 MG capsule Take 1 capsule by mouth Daily. 5/19/22   Luis Manuel Howard MD   polycarbophil (FIBERCON) 625 MG tablet Take 625 mg by mouth Daily.    ProviderAshvin MD   sucralfate (CARAFATE) 1 GM/10ML suspension 2 tablespoons 3 times daily for 10 days and as needed stomach and nausea 8/29/22   Lawson Garland MD       Allergies:  Sulfa antibiotics    ROS:    Review of Systems   Constitutional: Negative for activity change, appetite change, chills, diaphoresis, fatigue, fever and unexpected weight change.   HENT: Positive for trouble swallowing. Negative for sore throat.    Respiratory: Negative for shortness of breath.    Gastrointestinal: Negative for abdominal distention, abdominal pain, anal bleeding, blood in stool, constipation, diarrhea, nausea, rectal pain and vomiting.   Endocrine: Negative for polydipsia, polyphagia and polyuria.   Genitourinary: Negative for difficulty urinating.   Musculoskeletal: Negative for arthralgias.   Skin: Negative for pallor.   Allergic/Immunologic: Negative for food allergies.   Neurological: Negative for weakness and light-headedness.   Psychiatric/Behavioral: Negative for behavioral problems.     Objective     Blood pressure 161/73, pulse 85, height 152.4 cm (60\"), weight 69.4 kg (153 lb), not currently breastfeeding.    Physical Exam  Constitutional:       General: She is not in acute distress.     Appearance: She is well-developed. She is not diaphoretic.   HENT:      Head: Normocephalic and atraumatic.   Cardiovascular:      Rate and Rhythm: Normal rate and regular rhythm.      Heart sounds: Normal heart sounds. No murmur heard.    No friction rub. No gallop.   Pulmonary:      Effort: No respiratory distress.      Breath sounds: Normal breath sounds. No wheezing or " rales.   Chest:      Chest wall: No tenderness.   Abdominal:      General: Bowel sounds are normal. There is no distension.      Palpations: Abdomen is soft. There is no mass.      Tenderness: There is no abdominal tenderness. There is no guarding or rebound.      Hernia: No hernia is present.   Musculoskeletal:         General: Normal range of motion.   Skin:     General: Skin is warm and dry.      Coloration: Skin is not pale.      Findings: No erythema or rash.   Neurological:      Mental Status: She is alert and oriented to person, place, and time.   Psychiatric:         Behavior: Behavior normal.         Thought Content: Thought content normal.         Judgment: Judgment normal.          Assessment & Plan   Diagnoses and all orders for this visit:    1. Other dysphagia (Primary)  -     Motility Study, Esophageal; Future  -     Case Request; Standing  -     Case Request    Other orders  -     Follow Anesthesia Guidelines / Standing Orders; Future  -     Obtain Informed Consent; Future        ESOPHAGOGASTRODUODENOSCOPY (N/A)     Diagnosis Plan   1. Other dysphagia  Motility Study, Esophageal    Case Request    Case Request       Anticipated Surgical Procedure:  Orders Placed This Encounter   Procedures   • Follow Anesthesia Guidelines / Standing Orders     Standing Status:   Future   • Obtain Informed Consent     Standing Status:   Future     Order Specific Question:   Informed Consent Given For     Answer:   ESOPHAGOGASTRODUODENOSCOPY   • Motility Study, Esophageal     Standing Status:   Future     Standing Expiration Date:   9/2/2023     Order Specific Question:   Pre-op diagnosis     Answer:   dysphagia       The risks, benefits, and alternatives of this procedure have been discussed with the patient or the responsible party- the patient understands and agrees to proceed.

## 2022-09-02 NOTE — H&P (VIEW-ONLY)
[facility]      Chief Complaint:   Chief Complaint   Patient presents with   • Difficulty Swallowing   • Heartburn       Subjective     HPI:   Patient with dysphagia.  Patient states that after dilatation in May she had about a month of relief of symptoms but they have returned patient is now having hoarse voice at times.  Patient states that she was only dilated to 51 British and that she may need further dilatation.  Discussed with patient that I would like to do a motility study to be sure that this not achalasia but also agree that that we would need to dilate to 54 British.    Plan; we will schedule patient for motility study followed by EGD with dilatation.    Past Medical History:   Past Medical History:   Diagnosis Date   • Allergic rhinitis    • Artificial lens present     Artificial lens in position - left      • Benign hypertension    • Corns and callus    • Dry cough     most likely due to ACE inhibitor     • Essential hypertension    • GERD (gastroesophageal reflux disease)    • Glaucoma, both eyes    • Hammer toe    • History of Papanicolaou smear of cervix 01/19/2004    Atrophy present.   • Malabsorption of glucose    • Nondisplaced fracture of right radial styloid process, initial encounter for closed fracture    • Nuclear senile cataract of right eye    • Nuclear senile cataract of right eye    • Obesity    • Pain in right hand    • Primary open angle glaucoma     controlled left right IOP increased but OCT stable         Past Surgical History:  Past Surgical History:   Procedure Laterality Date   • BREAST SURGERY  11/22/1974    Cystic disease, right breast. Excision of lesion of right breast.   • ENDOSCOPY N/A 04/13/2022    Procedure: ESOPHAGOGASTRODUODENOSCOPY;  Surgeon: Luis Manuel Howard MD;  Location: [facility] ENDOSCOPY;  Service: Gastroenterology;  Laterality: N/A;   • ENDOSCOPY N/A 5/3/2022    Procedure: ESOPHAGOGASTRODUODENOSCOPY;  Surgeon: Luis Manuel Howard MD;  Location:  [facility] ENDOSCOPY;  Service: Gastroenterology;  Laterality: N/A;   • ENDOSCOPY AND COLONOSCOPY  02/11/2008    A few diverticula in the sigmoid.   • ESOPHAGOSCOPY / EGD  03/10/2008    Grade II esophagitis of the distal esophagus. Multiple biopsies were obtained from the distal esophagus. The stomach appeared normal. The duodenum appeared normal   • EXTERNAL EAR SURGERY  04/25/2001    Full thickness wedge excision of the left ear, with layered closure   • EYE SURGERY  02/19/2013    Remove cataract, insert lens (1)    left eye    • MASTECTOMY  01/10/1991    Total mastectomy with level I and II node dissection 2   • OTHER SURGICAL HISTORY  03/20/2015    OCT DISC NFL 88978 (2) (Primary open angle glaucoma)    • TRABECULECTOMY      Glaucoma       Family History:  Family History   Problem Relation Age of Onset   • Cancer Other    • Diabetes Other    • Heart disease Other    • Hypertension Other        Social History:   reports that she has never smoked. She has never used smokeless tobacco. She reports that she does not drink alcohol and does not use drugs.    Medications:   Prior to Admission medications    Medication Sig Start Date End Date Taking? Authorizing Provider   Aspirin Low Dose 81 MG EC tablet TAKE 1 TABLET EVERY DAY 8/19/22   Lawson Garland MD   atorvastatin (LIPITOR) 40 MG tablet TAKE 1 TABLET BY MOUTH EVERY DAY 5/20/22   Lawson Garland MD   Calcium-Magnesium-Vitamin D (CALCIUM 500 PO) Take 1 tablet by mouth 2 (Two) Times a Day.    Emergency, Nurse KRISTIE Howell   dorzolamide-timolol (COSOPT) 22.3-6.8 MG/ML ophthalmic solution Administer 1 drop to the right eye 2 (Two) Times a Day. 11/15/19   Emergency, Nurse KRISTIE Howell   latanoprost (XALATAN) 0.005 % ophthalmic solution Administer 1 drop to both eyes Every Night.    Provider, MD Ashvin   losartan (COZAAR) 50 MG tablet Take 50 mg by mouth Daily. 5/28/21   Emergency, Nurse Epic, RN   Multiple Vitamins-Minerals (MULTIVITAMIN WITH MINERALS) tablet tablet Take 1  "tablet by mouth Daily.    ProviderAshvin MD   Netarsudil Dimesylate (Rhopressa) 0.02 % solution Administer 1 drop to the right eye Every Night.    Ashvin Segura MD   omeprazole (priLOSEC) 20 MG capsule Take 1 capsule by mouth Daily. 5/19/22   Luis Manuel Howard MD   polycarbophil (FIBERCON) 625 MG tablet Take 625 mg by mouth Daily.    ProviderAshvin MD   sucralfate (CARAFATE) 1 GM/10ML suspension 2 tablespoons 3 times daily for 10 days and as needed stomach and nausea 8/29/22   Lawson Garland MD       Allergies:  Sulfa antibiotics    ROS:    Review of Systems   Constitutional: Negative for activity change, appetite change, chills, diaphoresis, fatigue, fever and unexpected weight change.   HENT: Positive for trouble swallowing. Negative for sore throat.    Respiratory: Negative for shortness of breath.    Gastrointestinal: Negative for abdominal distention, abdominal pain, anal bleeding, blood in stool, constipation, diarrhea, nausea, rectal pain and vomiting.   Endocrine: Negative for polydipsia, polyphagia and polyuria.   Genitourinary: Negative for difficulty urinating.   Musculoskeletal: Negative for arthralgias.   Skin: Negative for pallor.   Allergic/Immunologic: Negative for food allergies.   Neurological: Negative for weakness and light-headedness.   Psychiatric/Behavioral: Negative for behavioral problems.     Objective     Blood pressure 161/73, pulse 85, height 152.4 cm (60\"), weight 69.4 kg (153 lb), not currently breastfeeding.    Physical Exam  Constitutional:       General: She is not in acute distress.     Appearance: She is well-developed. She is not diaphoretic.   HENT:      Head: Normocephalic and atraumatic.   Cardiovascular:      Rate and Rhythm: Normal rate and regular rhythm.      Heart sounds: Normal heart sounds. No murmur heard.    No friction rub. No gallop.   Pulmonary:      Effort: No respiratory distress.      Breath sounds: Normal breath sounds. No wheezing or " rales.   Chest:      Chest wall: No tenderness.   Abdominal:      General: Bowel sounds are normal. There is no distension.      Palpations: Abdomen is soft. There is no mass.      Tenderness: There is no abdominal tenderness. There is no guarding or rebound.      Hernia: No hernia is present.   Musculoskeletal:         General: Normal range of motion.   Skin:     General: Skin is warm and dry.      Coloration: Skin is not pale.      Findings: No erythema or rash.   Neurological:      Mental Status: She is alert and oriented to person, place, and time.   Psychiatric:         Behavior: Behavior normal.         Thought Content: Thought content normal.         Judgment: Judgment normal.          Assessment & Plan   Diagnoses and all orders for this visit:    1. Other dysphagia (Primary)  -     Motility Study, Esophageal; Future  -     Case Request; Standing  -     Case Request    Other orders  -     Follow Anesthesia Guidelines / Standing Orders; Future  -     Obtain Informed Consent; Future        ESOPHAGOGASTRODUODENOSCOPY (N/A)     Diagnosis Plan   1. Other dysphagia  Motility Study, Esophageal    Case Request    Case Request       Anticipated Surgical Procedure:  Orders Placed This Encounter   Procedures   • Follow Anesthesia Guidelines / Standing Orders     Standing Status:   Future   • Obtain Informed Consent     Standing Status:   Future     Order Specific Question:   Informed Consent Given For     Answer:   ESOPHAGOGASTRODUODENOSCOPY   • Motility Study, Esophageal     Standing Status:   Future     Standing Expiration Date:   9/2/2023     Order Specific Question:   Pre-op diagnosis     Answer:   dysphagia       The risks, benefits, and alternatives of this procedure have been discussed with the patient or the responsible party- the patient understands and agrees to proceed.

## 2022-09-14 ENCOUNTER — HOSPITAL ENCOUNTER (OUTPATIENT)
Dept: GASTROENTEROLOGY | Facility: HOSPITAL | Age: 82
Discharge: HOME | End: 2022-09-14
Admitting: INTERNAL MEDICINE

## 2022-09-14 DIAGNOSIS — R13.19 OTHER DYSPHAGIA: ICD-10-CM

## 2022-09-14 PROCEDURE — 91010 ESOPHAGUS MOTILITY STUDY: CPT

## 2022-09-14 NOTE — NURSING NOTE
Time in Room:0730    B/P:202/92    Heart Rate:91    Respirations:20    O2 Sat %:99    Temp:97.4    NPO Status:0600 sip of water with b/p med    Pain:NO          If yes, location and VAS:      Patient arrived to the Endoscopy department ambulatory alert and oriented.  Procedure  Explained to patient, patient verbalized understanding.  Consent obtained. B/P rechecked 185/83.     Probe inserted into left ___ nare to _50__ cm.   Patient tolerated well.     Procedure start time:0748  Procedure stop time:0804    Study complete, probe removed intact.  Patient tolerated well. No bleeding noted from left nare.      B/P:198/88    Heart Rate:75    Respirations:18    O2 Sat %:97      Patient Discharged to home time:0810 Pt states has appt for EGD next week with Dr. Mathew and then an office appt after that. Instructed pt to keep those appts and also to check her b/p when she gets home. She voices understanding. Pt left endo unit ambulatory.

## 2022-09-15 RX ORDER — ATORVASTATIN CALCIUM 40 MG/1
40 TABLET, FILM COATED ORAL NIGHTLY
COMMUNITY
End: 2023-02-14

## 2022-09-15 RX ORDER — ASPIRIN 81 MG/1
81 TABLET ORAL DAILY
COMMUNITY
End: 2022-11-14

## 2022-09-20 ENCOUNTER — ANESTHESIA (OUTPATIENT)
Dept: GASTROENTEROLOGY | Facility: HOSPITAL | Age: 82
End: 2022-09-20

## 2022-09-20 ENCOUNTER — HOSPITAL ENCOUNTER (OUTPATIENT)
Facility: HOSPITAL | Age: 82
Setting detail: HOSPITAL OUTPATIENT SURGERY
Discharge: HOME | End: 2022-09-20
Attending: INTERNAL MEDICINE | Admitting: INTERNAL MEDICINE

## 2022-09-20 ENCOUNTER — ANESTHESIA EVENT (OUTPATIENT)
Dept: GASTROENTEROLOGY | Facility: HOSPITAL | Age: 82
End: 2022-09-20

## 2022-09-20 VITALS
SYSTOLIC BLOOD PRESSURE: 133 MMHG | DIASTOLIC BLOOD PRESSURE: 60 MMHG | WEIGHT: 152 LBS | HEIGHT: 63 IN | RESPIRATION RATE: 18 BRPM | OXYGEN SATURATION: 98 % | HEART RATE: 80 BPM | BODY MASS INDEX: 26.93 KG/M2 | TEMPERATURE: 97 F

## 2022-09-20 DIAGNOSIS — R13.19 OTHER DYSPHAGIA: ICD-10-CM

## 2022-09-20 PROCEDURE — 25010000002 PROPOFOL 10 MG/ML EMULSION

## 2022-09-20 PROCEDURE — 43248 EGD GUIDE WIRE INSERTION: CPT | Performed by: INTERNAL MEDICINE

## 2022-09-20 PROCEDURE — C1769 GUIDE WIRE: HCPCS | Performed by: INTERNAL MEDICINE

## 2022-09-20 RX ORDER — DEXTROSE AND SODIUM CHLORIDE 5; .45 G/100ML; G/100ML
30 INJECTION, SOLUTION INTRAVENOUS CONTINUOUS PRN
Status: DISCONTINUED | OUTPATIENT
Start: 2022-09-20 | End: 2022-09-20 | Stop reason: HOSPADM

## 2022-09-20 RX ORDER — PROPOFOL 10 MG/ML
VIAL (ML) INTRAVENOUS AS NEEDED
Status: DISCONTINUED | OUTPATIENT
Start: 2022-09-20 | End: 2022-09-20 | Stop reason: SURG

## 2022-09-20 RX ADMIN — PROPOFOL 20 MG: 10 INJECTION, EMULSION INTRAVENOUS at 15:06

## 2022-09-20 RX ADMIN — DEXTROSE AND SODIUM CHLORIDE 30 ML/HR: 5; 450 INJECTION, SOLUTION INTRAVENOUS at 14:05

## 2022-09-20 RX ADMIN — PROPOFOL 80 MG: 10 INJECTION, EMULSION INTRAVENOUS at 15:05

## 2022-09-20 RX ADMIN — PROPOFOL 10 MG: 10 INJECTION, EMULSION INTRAVENOUS at 15:07

## 2022-09-20 NOTE — ANESTHESIA PREPROCEDURE EVALUATION
Anesthesia Evaluation     Patient summary reviewed and Nursing notes reviewed   history of anesthetic complications: PONV  NPO Solid Status: > 8 hours  NPO Liquid Status: > 8 hours           Airway   Mallampati: II  TM distance: >3 FB  Neck ROM: full  Possible difficult intubation  Dental    (+) poor dentition    Pulmonary - normal exam   (-) not a smoker    ROS comment: Dry cough; likely due to ACE inhibitor  Cardiovascular - normal exam  Exercise tolerance: good (4-7 METS)    (+) hypertension well controlled less than 2 medications, hyperlipidemia,       Neuro/Psych- negative ROS  GI/Hepatic/Renal/Endo    (+)  GERD well controlled,  renal disease,     Musculoskeletal (-) negative ROS    Abdominal  - normal exam   Substance History - negative use     OB/GYN negative ob/gyn ROS         Other      history of cancer      Other Comment: Breast CA; post mastectomy                  Anesthesia Plan    ASA 2     MAC   total IV anesthesia  intravenous induction     Anesthetic plan, risks, benefits, and alternatives have been provided, discussed and informed consent has been obtained with: patient.        CODE STATUS:

## 2022-09-20 NOTE — ANESTHESIA POSTPROCEDURE EVALUATION
Patient: Lyndsay Brumfield    Procedure Summary     Date: 09/20/22 Room / Location: [facility] ENDOSCOPY 3 / [facility] ENDOSCOPY    Anesthesia Start: 1459 Anesthesia Stop: 1510    Procedure: ESOPHAGOGASTRODUODENOSCOPY (N/A ) Diagnosis:       Other dysphagia      (Other dysphagia [R13.19])    Surgeons: Luis Manuel Howard MD Provider: Wilma Ruiz CRNA    Anesthesia Type: MAC ASA Status: 2          Anesthesia Type: MAC    Vitals  No vitals data found for the desired time range.          Post Anesthesia Care and Evaluation    Patient location during evaluation: bedside  Patient participation: waiting for patient participation  Level of consciousness: responsive to verbal stimuli  Pain management: adequate    Airway patency: patent  Anesthetic complications: No anesthetic complications  PONV Status: none  Cardiovascular status: acceptable  Respiratory status: acceptable  Hydration status: acceptable    Comments: ---------------------------               09/20/22                      1353         ---------------------------   BP:        (!) 204/64       Pulse:         86           Resp:          20           Temp:   97.5 °F (36.4 °C)   SpO2:         100%         ---------------------------

## 2022-10-19 ENCOUNTER — CLINICAL SUPPORT (OUTPATIENT)
Dept: FAMILY MEDICINE CLINIC | Facility: CLINIC | Age: 82
End: 2022-10-19

## 2022-10-19 DIAGNOSIS — Z23 NEED FOR INFLUENZA VACCINATION: Primary | ICD-10-CM

## 2022-10-19 PROCEDURE — G0008 ADMIN INFLUENZA VIRUS VAC: HCPCS | Performed by: FAMILY MEDICINE

## 2022-10-19 PROCEDURE — 90662 IIV NO PRSV INCREASED AG IM: CPT | Performed by: FAMILY MEDICINE

## 2022-11-04 ENCOUNTER — APPOINTMENT (OUTPATIENT)
Dept: GENERAL RADIOLOGY | Facility: HOSPITAL | Age: 82
End: 2022-11-04

## 2022-11-04 ENCOUNTER — HOSPITAL ENCOUNTER (EMERGENCY)
Facility: HOSPITAL | Age: 82
Discharge: HOME | End: 2022-11-04
Attending: EMERGENCY MEDICINE | Admitting: EMERGENCY MEDICINE

## 2022-11-04 VITALS
DIASTOLIC BLOOD PRESSURE: 70 MMHG | SYSTOLIC BLOOD PRESSURE: 149 MMHG | RESPIRATION RATE: 18 BRPM | HEIGHT: 63 IN | WEIGHT: 154 LBS | HEART RATE: 79 BPM | BODY MASS INDEX: 27.29 KG/M2 | OXYGEN SATURATION: 97 % | TEMPERATURE: 97.9 F

## 2022-11-04 DIAGNOSIS — R00.0 TACHYCARDIA: Primary | ICD-10-CM

## 2022-11-04 DIAGNOSIS — E83.42 HYPOMAGNESEMIA: ICD-10-CM

## 2022-11-04 LAB
ALBUMIN SERPL-MCNC: 4.6 G/DL (ref 3.5–5.2)
ALBUMIN/GLOB SERPL: 1.4 G/DL
ALP SERPL-CCNC: 67 U/L (ref 39–117)
ALT SERPL W P-5'-P-CCNC: 17 U/L (ref 1–33)
ANION GAP SERPL CALCULATED.3IONS-SCNC: 13 MMOL/L (ref 5–15)
AST SERPL-CCNC: 26 U/L (ref 1–32)
BASOPHILS # BLD AUTO: 0.07 10*3/MM3 (ref 0–0.2)
BASOPHILS NFR BLD AUTO: 0.9 % (ref 0–1.5)
BILIRUB SERPL-MCNC: 0.3 MG/DL (ref 0–1.2)
BUN SERPL-MCNC: 20 MG/DL (ref 8–23)
BUN/CREAT SERPL: 16.9 (ref 7–25)
CALCIUM SPEC-SCNC: 9.9 MG/DL (ref 8.6–10.5)
CHLORIDE SERPL-SCNC: 97 MMOL/L (ref 98–107)
CO2 SERPL-SCNC: 24 MMOL/L (ref 22–29)
CREAT SERPL-MCNC: 1.18 MG/DL (ref 0.57–1)
DEPRECATED RDW RBC AUTO: 43 FL (ref 37–54)
EGFRCR SERPLBLD CKD-EPI 2021: 45.6 ML/MIN/1.73
EOSINOPHIL # BLD AUTO: 0.22 10*3/MM3 (ref 0–0.4)
EOSINOPHIL NFR BLD AUTO: 2.7 % (ref 0.3–6.2)
ERYTHROCYTE [DISTWIDTH] IN BLOOD BY AUTOMATED COUNT: 12.3 % (ref 12.3–15.4)
GLOBULIN UR ELPH-MCNC: 3.4 GM/DL
GLUCOSE SERPL-MCNC: 193 MG/DL (ref 65–99)
HCT VFR BLD AUTO: 39.5 % (ref 34–46.6)
HGB BLD-MCNC: 13.1 G/DL (ref 12–15.9)
HOLD SPECIMEN: NORMAL
HOLD SPECIMEN: NORMAL
IMM GRANULOCYTES # BLD AUTO: 0.01 10*3/MM3 (ref 0–0.05)
IMM GRANULOCYTES NFR BLD AUTO: 0.1 % (ref 0–0.5)
LYMPHOCYTES # BLD AUTO: 3.21 10*3/MM3 (ref 0.7–3.1)
LYMPHOCYTES NFR BLD AUTO: 39.7 % (ref 19.6–45.3)
MAGNESIUM SERPL-MCNC: 1.5 MG/DL (ref 1.6–2.4)
MCH RBC QN AUTO: 31.8 PG (ref 26.6–33)
MCHC RBC AUTO-ENTMCNC: 33.2 G/DL (ref 31.5–35.7)
MCV RBC AUTO: 95.9 FL (ref 79–97)
MONOCYTES # BLD AUTO: 0.96 10*3/MM3 (ref 0.1–0.9)
MONOCYTES NFR BLD AUTO: 11.9 % (ref 5–12)
NEUTROPHILS NFR BLD AUTO: 3.61 10*3/MM3 (ref 1.7–7)
NEUTROPHILS NFR BLD AUTO: 44.7 % (ref 42.7–76)
NRBC BLD AUTO-RTO: 0 /100 WBC (ref 0–0.2)
NT-PROBNP SERPL-MCNC: 307.8 PG/ML (ref 0–1800)
PLATELET # BLD AUTO: 265 10*3/MM3 (ref 140–450)
PMV BLD AUTO: 10 FL (ref 6–12)
POTASSIUM SERPL-SCNC: 3.7 MMOL/L (ref 3.5–5.2)
PROT SERPL-MCNC: 8 G/DL (ref 6–8.5)
RBC # BLD AUTO: 4.12 10*6/MM3 (ref 3.77–5.28)
SODIUM SERPL-SCNC: 134 MMOL/L (ref 136–145)
T4 FREE SERPL-MCNC: 1.39 NG/DL (ref 0.93–1.7)
TROPONIN T SERPL-MCNC: <0.01 NG/ML (ref 0–0.03)
TROPONIN T SERPL-MCNC: <0.01 NG/ML (ref 0–0.03)
TSH SERPL DL<=0.05 MIU/L-ACNC: 2.85 UIU/ML (ref 0.27–4.2)
WBC NRBC COR # BLD: 8.08 10*3/MM3 (ref 3.4–10.8)
WHOLE BLOOD HOLD COAG: NORMAL
WHOLE BLOOD HOLD SPECIMEN: NORMAL

## 2022-11-04 PROCEDURE — 71045 X-RAY EXAM CHEST 1 VIEW: CPT

## 2022-11-04 PROCEDURE — 84484 ASSAY OF TROPONIN QUANT: CPT | Performed by: EMERGENCY MEDICINE

## 2022-11-04 PROCEDURE — 84443 ASSAY THYROID STIM HORMONE: CPT | Performed by: EMERGENCY MEDICINE

## 2022-11-04 PROCEDURE — 36415 COLL VENOUS BLD VENIPUNCTURE: CPT

## 2022-11-04 PROCEDURE — 93010 ELECTROCARDIOGRAM REPORT: CPT | Performed by: INTERNAL MEDICINE

## 2022-11-04 PROCEDURE — 25010000002 MAGNESIUM SULFATE 2 GM/50ML SOLUTION: Performed by: EMERGENCY MEDICINE

## 2022-11-04 PROCEDURE — 93005 ELECTROCARDIOGRAM TRACING: CPT

## 2022-11-04 PROCEDURE — 93005 ELECTROCARDIOGRAM TRACING: CPT | Performed by: EMERGENCY MEDICINE

## 2022-11-04 PROCEDURE — 99284 EMERGENCY DEPT VISIT MOD MDM: CPT

## 2022-11-04 PROCEDURE — 83880 ASSAY OF NATRIURETIC PEPTIDE: CPT | Performed by: EMERGENCY MEDICINE

## 2022-11-04 PROCEDURE — 84439 ASSAY OF FREE THYROXINE: CPT | Performed by: EMERGENCY MEDICINE

## 2022-11-04 PROCEDURE — 85025 COMPLETE CBC W/AUTO DIFF WBC: CPT | Performed by: EMERGENCY MEDICINE

## 2022-11-04 PROCEDURE — 83735 ASSAY OF MAGNESIUM: CPT | Performed by: EMERGENCY MEDICINE

## 2022-11-04 PROCEDURE — 96365 THER/PROPH/DIAG IV INF INIT: CPT

## 2022-11-04 PROCEDURE — 80053 COMPREHEN METABOLIC PANEL: CPT | Performed by: EMERGENCY MEDICINE

## 2022-11-04 RX ORDER — MAGNESIUM OXIDE 400 MG/1
400 TABLET ORAL DAILY
Qty: 14 TABLET | Refills: 0 | Status: SHIPPED | OUTPATIENT
Start: 2022-11-04 | End: 2022-12-30

## 2022-11-04 RX ORDER — SODIUM CHLORIDE 0.9 % (FLUSH) 0.9 %
10 SYRINGE (ML) INJECTION AS NEEDED
Status: DISCONTINUED | OUTPATIENT
Start: 2022-11-04 | End: 2022-11-04 | Stop reason: HOSPADM

## 2022-11-04 RX ORDER — ASPIRIN 325 MG
325 TABLET ORAL ONCE
Status: COMPLETED | OUTPATIENT
Start: 2022-11-04 | End: 2022-11-04

## 2022-11-04 RX ORDER — MAGNESIUM SULFATE HEPTAHYDRATE 40 MG/ML
2 INJECTION, SOLUTION INTRAVENOUS ONCE
Status: COMPLETED | OUTPATIENT
Start: 2022-11-04 | End: 2022-11-04

## 2022-11-04 RX ORDER — ADENOSINE 3 MG/ML
6 INJECTION, SOLUTION INTRAVENOUS ONCE
Status: DISCONTINUED | OUTPATIENT
Start: 2022-11-04 | End: 2022-11-04

## 2022-11-04 RX ADMIN — MAGNESIUM SULFATE HEPTAHYDRATE 2 G: 40 INJECTION, SOLUTION INTRAVENOUS at 17:03

## 2022-11-04 RX ADMIN — SODIUM CHLORIDE 500 ML: 9 INJECTION, SOLUTION INTRAVENOUS at 15:16

## 2022-11-04 RX ADMIN — ASPIRIN 325 MG: 325 TABLET ORAL at 15:16

## 2022-11-04 NOTE — ED PROVIDER NOTES
Subjective   History of Present Illness  The patient denies any past significant cardiovascular history.  Does have history of hypertension.  Last cardiology note I can see is from 2017 and there is no indication of cardiac dysrhythmia in that documentation.    Palpitations  Palpitations quality:  Fast  Onset quality:  Sudden (At rest)  Duration:  3 hours  Timing:  Constant  Progression:  Unchanged  Chronicity:  New  Context: not anxiety, not blood loss, not caffeine and not illicit drugs    Relieved by:  Nothing  Worsened by:  Nothing  Ineffective treatments:  None tried  Associated symptoms: shortness of breath    Associated symptoms: no chest pain, no chest pressure, no diaphoresis, no dizziness, no hemoptysis, no leg pain, no lower extremity edema, no syncope and no vomiting        Review of Systems   Constitutional: Negative for diaphoresis.   Respiratory: Positive for shortness of breath. Negative for hemoptysis.    Cardiovascular: Positive for palpitations. Negative for chest pain and syncope.   Gastrointestinal: Negative for vomiting.   Neurological: Negative for dizziness.   All other systems reviewed and are negative.      Past Medical History:   Diagnosis Date   • Allergic rhinitis    • Artificial lens present     Artificial lens in position - left      • Benign hypertension    • Breast cancer (HCC)    • CKD (chronic kidney disease)    • Corns and callus    • Dry cough     most likely due to ACE inhibitor     • Elevated cholesterol    • Essential hypertension    • GERD (gastroesophageal reflux disease)    • Glaucoma, both eyes    • Hammer toe    • History of Papanicolaou smear of cervix 01/19/2004    Atrophy present.   • Malabsorption of glucose    • Nondisplaced fracture of right radial styloid process, initial encounter for closed fracture    • Nuclear senile cataract of right eye    • Nuclear senile cataract of right eye    • Obesity    • Pain in right hand    • PONV (postoperative nausea and vomiting)     • Primary open angle glaucoma     controlled left right IOP increased but OCT stable         Allergies   Allergen Reactions   • Sulfa Antibiotics Itching       Past Surgical History:   Procedure Laterality Date   • BREAST SURGERY Right 11/22/1974    Cystic disease, right breast. Excision of lesion of right breast.   • ENDOSCOPY N/A 04/13/2022    Procedure: ESOPHAGOGASTRODUODENOSCOPY;  Surgeon: Luis Manuel Howard MD;  Location: [facility] ENDOSCOPY;  Service: Gastroenterology;  Laterality: N/A;   • ENDOSCOPY N/A 05/03/2022    Procedure: ESOPHAGOGASTRODUODENOSCOPY;  Surgeon: Luis Manuel Howard MD;  Location: [facility] ENDOSCOPY;  Service: Gastroenterology;  Laterality: N/A;   • ENDOSCOPY N/A 9/20/2022    Procedure: ESOPHAGOGASTRODUODENOSCOPY;  Surgeon: Luis Manuel Howard MD;  Location: [facility] ENDOSCOPY;  Service: Gastroenterology;  Laterality: N/A;   • ENDOSCOPY AND COLONOSCOPY  02/11/2008    A few diverticula in the sigmoid.   • ESOPHAGOSCOPY / EGD  03/10/2008    Grade II esophagitis of the distal esophagus. Multiple biopsies were obtained from the distal esophagus. The stomach appeared normal. The duodenum appeared normal   • EXTERNAL EAR SURGERY Left 04/25/2001    Full thickness wedge excision of the left ear, with layered closure   • EYE SURGERY Bilateral 02/19/2013    Remove cataract, insert lens (1)    left eye    • MASTECTOMY Bilateral 01/10/1991    Total mastectomy with level I and II node dissection 2   • TRABECULECTOMY Bilateral     Glaucoma       Family History   Problem Relation Age of Onset   • Cancer Other    • Diabetes Other    • Heart disease Other    • Hypertension Other        Social History     Socioeconomic History   • Marital status:    Tobacco Use   • Smoking status: Never   • Smokeless tobacco: Never   Vaping Use   • Vaping Use: Never used   Substance and Sexual Activity   • Alcohol use: Never   • Drug use: Never   • Sexual activity: Defer           Objective   Physical Exam  Vitals and nursing  note reviewed.   Constitutional:       Appearance: She is not ill-appearing.   HENT:      Head: Normocephalic.      Mouth/Throat:      Mouth: Mucous membranes are moist.   Neck:      Vascular: No JVD.   Cardiovascular:      Rate and Rhythm: Regular rhythm. Tachycardia present.   Pulmonary:      Effort: Pulmonary effort is normal.      Breath sounds: Normal breath sounds.   Chest:      Chest wall: No tenderness.   Abdominal:      Palpations: Abdomen is soft.      Tenderness: There is no abdominal tenderness.   Musculoskeletal:      Right lower leg: No tenderness. No edema.      Left lower leg: No tenderness. No edema.   Skin:     General: Skin is warm and dry.   Neurological:      Mental Status: She is alert and oriented to person, place, and time.   Psychiatric:         Behavior: Behavior normal.         Procedures           ED Course                                           MDM  Number of Diagnoses or Management Options     Amount and/or Complexity of Data Reviewed  Clinical lab tests: reviewed  Tests in the radiology section of CPT®: reviewed  Tests in the medicine section of CPT®: reviewed  Decide to obtain previous medical records or to obtain history from someone other than the patient: yes  Obtain history from someone other than the patient: yes  Review and summarize past medical records: yes      EKG interpretation: Interpreted by myself.  Supraventricular tachycardia.  Rate 167.  Normal QRS and axis.  Normal QTc interval.  ST segments are nonspecific.    Prior to the administration of adenosine the patient converted into a normal sinus rhythm.  Will repeat EKG.  We will monitor the patient while checking labs and giving fluids.  Possible SVT versus atrial fibrillation.  She will need cardiology follow-up.    Repeat EKG interpretation: Interpreted by myself.  Normal sinus rhythm.  Rate 95.  Normal P wave and IA interval.  Left ventricular hypertrophy and normal axis.  Normal QTc interval.  ST segments are  normal.    On final reevaluation at 5:30 PM the patient is resting comfortably.  She has not had any returns to tachycardia.  She will follow-up with cardiology and PCP.  We discussed reasons for early return to the emergency department as well.  Final diagnoses:   Tachycardia   Hypomagnesemia       ED Disposition  ED Disposition     ED Disposition   Discharge    Condition   Stable    Comment   --             Lawson Garland MD  [address] [facility]  [address]LO[address]  [place] [id number] [phone]      As needed    Rico Foster MD  [address] [facility] [address]LO[address]  [place] [address]  [phone]    Call in 3 days  To make an appointment to be evaluated for a heart dysrhythmia.    [facility] EMERGENCY DEPARTMENT  [address] [facility] [address] [phone]    As needed, If symptoms worsen at any time         Medication List      New Prescriptions    magnesium oxide 400 MG tablet  Commonly known as: MAG-OX  Take 1 tablet by mouth Daily.           Where to Get Your Medications      These medications were sent to [facility] - [place], [place] - [address] [facility] [phone] [facility] [phone]   [address] [place] [address]    Phone: [phone]   · magnesium oxide 400 MG tablet          Luis Manuel Hanks DO  11/04/22 [id number]

## 2022-11-04 NOTE — DISCHARGE INSTRUCTIONS
"Your heart spontaneously returned to a normal rhythm.  When you entered the emergency department we were concerned that you might have either \"SVT\" or \"atrial fibrillation with rapid ventricular response\".  Please follow-up with cardiology and your family doctor to further evaluate and to recheck your magnesium level.  "

## 2022-11-07 ENCOUNTER — TELEPHONE (OUTPATIENT)
Dept: ADMINISTRATIVE | Facility: HOSPITAL | Age: 82
End: 2022-11-07

## 2022-11-07 DIAGNOSIS — I47.1 PAROXYSMAL SVT (SUPRAVENTRICULAR TACHYCARDIA): Primary | ICD-10-CM

## 2022-11-07 NOTE — TELEPHONE ENCOUNTER
Patient seen in ER with Tachycardia and told to f/u with CardiologyMerly Bautista stated that she put an order in for a monitor and to make an appt for f/u to see her. Ok to Offer [place] Friday 11/11 in [place] if she was willing

## 2022-11-09 LAB
QT INTERVAL: 288 MS
QT INTERVAL: 342 MS
QTC INTERVAL: 429 MS
QTC INTERVAL: 480 MS

## 2022-11-11 ENCOUNTER — OFFICE VISIT (OUTPATIENT)
Dept: CARDIOLOGY | Facility: CLINIC | Age: 82
End: 2022-11-11

## 2022-11-11 VITALS
OXYGEN SATURATION: 97 % | DIASTOLIC BLOOD PRESSURE: 84 MMHG | BODY MASS INDEX: 27.36 KG/M2 | SYSTOLIC BLOOD PRESSURE: 160 MMHG | HEIGHT: 63 IN | WEIGHT: 154.4 LBS | HEART RATE: 81 BPM

## 2022-11-11 DIAGNOSIS — I47.1 PAROXYSMAL SVT (SUPRAVENTRICULAR TACHYCARDIA): Primary | ICD-10-CM

## 2022-11-11 PROCEDURE — 99214 OFFICE O/P EST MOD 30 MIN: CPT | Performed by: NURSE PRACTITIONER

## 2022-11-11 NOTE — PROGRESS NOTES
Palpitations      History of Present Illness  Ms. Lyndsay Chicas/Octaviano is a 82-year-old female with medical history of hypertension, GERD, glaucoma, and esophageal stricture requiring dilatation.    She was recently seen at [facility] ER for tachycardia, EKG showed supraventricular tachycardia at rate of 167 bpm.  While in the ER they were going to perform a chemical cardioversion however she spontaneously converted to normal sinus rhythm.  Labs showed that the following: sodium 134, Chloride 94, CR 1.18, eGFR 45.6, TSH wnl, CBC unremarkable  She was sent home on magnesium oxide 1 tablet daily and with recommendation to follow up with Cardiology.    Today, her daughter is present with her.     Symptoms occurred after she voted.  She felt her heart beating fast and she went home to check her blood pressure and it was high as well as her heart rate was high. Associated symptoms are shortness of air.  She experiences shortness of air with activity and while sitting.  Denies chest pain, nausea or diaphoresis.  She has experienced occasional palpitations and choking episodes before this event..  She also has esophageal stricture that is dilated on occasion.  Reports that she has had a heart murmur for a long time.  Denies previous cardiovascular problems..    The ASCVD Risk score (Bakari DK, et al., 2019) failed to calculate for the following reasons:    The 2019 ASCVD risk score is only valid for ages 40 to 79    Cardiac Risk Factors:  hypertension and Sedentary life style    Past Medical History:   Diagnosis Date   • Allergic rhinitis    • Artificial lens present     Artificial lens in position - left      • Benign hypertension    • Breast cancer (HCC)    • CKD (chronic kidney disease)    • Corns and callus    • Dry cough     most likely due to ACE inhibitor     • Elevated cholesterol    • Essential hypertension    • GERD (gastroesophageal reflux disease)    • Glaucoma, both eyes    • Hammer toe    • History of  Papanicolaou smear of cervix 01/19/2004    Atrophy present.   • Malabsorption of glucose    • Nondisplaced fracture of right radial styloid process, initial encounter for closed fracture    • Nuclear senile cataract of right eye    • Nuclear senile cataract of right eye    • Obesity    • Pain in right hand    • PONV (postoperative nausea and vomiting)    • Primary open angle glaucoma     controlled left right IOP increased but OCT stable       Past Surgical History:   Procedure Laterality Date   • BREAST SURGERY Right 11/22/1974    Cystic disease, right breast. Excision of lesion of right breast.   • ENDOSCOPY N/A 04/13/2022    Procedure: ESOPHAGOGASTRODUODENOSCOPY;  Surgeon: Luis Manuel Howard MD;  Location: [facility] ENDOSCOPY;  Service: Gastroenterology;  Laterality: N/A;   • ENDOSCOPY N/A 05/03/2022    Procedure: ESOPHAGOGASTRODUODENOSCOPY;  Surgeon: Luis Manuel Howard MD;  Location: [facility] ENDOSCOPY;  Service: Gastroenterology;  Laterality: N/A;   • ENDOSCOPY N/A 9/20/2022    Procedure: ESOPHAGOGASTRODUODENOSCOPY;  Surgeon: Luis Manuel Howard MD;  Location: [facility] ENDOSCOPY;  Service: Gastroenterology;  Laterality: N/A;   • ENDOSCOPY AND COLONOSCOPY  02/11/2008    A few diverticula in the sigmoid.   • ESOPHAGOSCOPY / EGD  03/10/2008    Grade II esophagitis of the distal esophagus. Multiple biopsies were obtained from the distal esophagus. The stomach appeared normal. The duodenum appeared normal   • EXTERNAL EAR SURGERY Left 04/25/2001    Full thickness wedge excision of the left ear, with layered closure   • EYE SURGERY Bilateral 02/19/2013    Remove cataract, insert lens (1)    left eye    • MASTECTOMY Bilateral 01/10/1991    Total mastectomy with level I and II node dissection 2   • TRABECULECTOMY Bilateral     Glaucoma     Social History     Socioeconomic History   • Marital status:    Tobacco Use   • Smoking status: Never   • Smokeless tobacco: Never   Vaping Use   • Vaping Use: Never used   Substance and  Sexual Activity   • Alcohol use: Never   • Drug use: Never   • Sexual activity: Defer     Family History   Problem Relation Age of Onset   • Cancer Other    • Diabetes Other    • Heart disease Other    • Hypertension Other        ALLERGIES:  Allergies   Allergen Reactions   • Sulfa Antibiotics Itching       Review of Systems   Constitutional: Negative for diaphoresis, fever and malaise/fatigue.   HENT: Negative for congestion and sore throat.    Eyes: Negative for blurred vision and double vision.   Cardiovascular: Positive for dyspnea on exertion, irregular heartbeat and palpitations. Negative for chest pain, leg swelling, near-syncope and syncope.        Fast heart rate   Respiratory: Positive for shortness of breath. Negative for cough and wheezing.    Endocrine: Negative for polydipsia, polyphagia and polyuria.   Hematologic/Lymphatic: Negative for adenopathy and bleeding problem.   Musculoskeletal: Positive for back pain (thoracic). Negative for neck pain.   Gastrointestinal: Negative for bloating, abdominal pain and nausea.   Neurological: Negative for dizziness, headaches and light-headedness.   Psychiatric/Behavioral: Negative.    Allergic/Immunologic: Negative.      Current Outpatient Medications   Medication Sig Dispense Refill   • aspirin 81 MG EC tablet Take 81 mg by mouth Daily.     • atorvastatin (LIPITOR) 40 MG tablet Take 40 mg by mouth Every Night.     • Calcium-Magnesium-Vitamin D (CALCIUM 500 PO) Take 1 tablet by mouth 2 (Two) Times a Day.     • dorzolamide-timolol (COSOPT) 22.3-6.8 MG/ML ophthalmic solution Administer 1 drop to the right eye 2 (Two) Times a Day.  6   • latanoprost (XALATAN) 0.005 % ophthalmic solution Administer 1 drop to both eyes Every Night.     • losartan (COZAAR) 50 MG tablet Take 50 mg by mouth Daily.     • magnesium oxide (MAG-OX) 400 MG tablet Take 1 tablet by mouth Daily. 14 tablet 0   • metaxalone (Skelaxin) 800 MG tablet Take 0.5 tablets by mouth 3 (Three) Times a Day.  "90 tablet 5   • Multiple Vitamins-Minerals (MULTIVITAMIN WITH MINERALS) tablet tablet Take 1 tablet by mouth Daily.     • Netarsudil Dimesylate (Rhopressa) 0.02 % solution Administer 1 drop to the right eye Every Night.     • omeprazole (priLOSEC) 20 MG capsule Take 1 capsule by mouth Daily. 90 capsule 2   • polycarbophil (FIBERCON) 625 MG tablet Take 625 mg by mouth Daily.       No current facility-administered medications for this visit.       OBJECTIVE:    Physical Exam:   Vitals reviewed.   Constitutional:       Appearance: Normal and healthy appearance. Well-developed, well-groomed, normal weight and not in distress.   Eyes:      General: Lids are normal.      Conjunctiva/sclera: Conjunctivae normal.      Right eye: Right conjunctiva is not injected.      Left eye: Left conjunctiva is not injected.   HENT:      Head: Normocephalic and atraumatic.   Pulmonary:      Effort: Pulmonary effort is normal.      Breath sounds: Normal breath sounds. No wheezing. No rhonchi.   Cardiovascular:      Regular rhythm. Normal S1 with normal intensity. Normal S2 with normal intensity.      Murmurs: There is no murmur.      No gallop.   Edema:     Peripheral edema absent.   Skin:     General: Skin is warm and dry.      Capillary Refill: Capillary refill takes less than 2 seconds.      Coloration: Skin is not jaundiced or pale.   Neurological:      General: No focal deficit present.      Mental Status: Alert, oriented to person, place, and time and oriented to person, place and time.      Gait: Gait is intact.   Psychiatric:         Attention and Perception: Attention normal.         Mood and Affect: Mood normal.         Speech: Speech normal.         Behavior: Behavior normal. Behavior is cooperative.       Vitals:    11/11/22 0849   BP: 160/84   BP Location: Left arm   Patient Position: Sitting   Cuff Size: Adult   Pulse: 81   SpO2: 97%   Weight: 70 kg (154 lb 6.4 oz)   Height: 160 cm (63\")       DATA REVIEWED:   Results for " orders placed in visit on 08/06/18    SCANNED - ECHOCARDIOGRAM      XR Chest 1 View    Result Date: 11/4/2022  No evidence of active disease. Electronically signed by:  Jovon Peralta MD  11/4/2022 3:27 PM CDT Workstation: [id number]    CXR:     CT:     Labs: BMP, CBC, LIPID, TSH  Lab Results   Component Value Date    GLUCOSE 193 (H) 11/04/2022    CALCIUM 9.9 11/04/2022     (L) 11/04/2022    K 3.7 11/04/2022    CO2 24.0 11/04/2022    CL 97 (L) 11/04/2022    BUN 20 11/04/2022    CREATININE 1.18 (H) 11/04/2022    EGFRIFNONA 57 (L) 01/31/2022    BCR 16.9 11/04/2022    ANIONGAP 13.0 11/04/2022     Lab Results   Component Value Date    WBC 8.08 11/04/2022    HGB 13.1 11/04/2022    HCT 39.5 11/04/2022    MCV 95.9 11/04/2022     11/04/2022     Lab Results   Component Value Date    CHOL 118 09/16/2021    CHOL 140 09/12/2018    CHOL 210 (H) 11/14/2017     Lab Results   Component Value Date    TRIG 86 09/16/2021    TRIG 70 09/12/2018    TRIG 57 08/05/2018     Lab Results   Component Value Date    HDL 67 (H) 09/16/2021    HDL 66 09/12/2018    HDL 73 08/05/2018     No components found for: LDLCALC  Lab Results   Component Value Date    LDL 34 09/16/2021    LDL 45 09/12/2018     08/05/2018     No results found for: HDLLDLRATIO  No components found for: CHOLHDL  Lab Results   Component Value Date    TSH 2.850 11/04/2022     Lab Results   Component Value Date    PROBNP 307.8 11/04/2022     EKG:           The following portions of the patient's history were reviewed and updated as appropriate: allergies, current medications, past family history, past medical history, past social history, past surgical history and problem list.  Old records reviewed and pertinent information is included in the above objective data.     ASSESSMENT/PLAN:       Diagnosis Plan   1. Paroxysmal SVT (supraventricular tachycardia) (HCC)  Adult Transthoracic Echo Complete w/ Color, Spectral and Contrast if Necessary Per Protocol          1.   Paroxysmal supraventricular tachycardia.  She experienced fluttering in chest and palpitations.  Became hypertensive and went to the ER.  ER did EKG showed paroxysmal supraventricular tachycardia at 167 bpm.  She has had palpitations off and on for a while.    -Ziwie holter was placed after ER discharge. Plan to wear for 14 days. Need to assess for other atrial arrhythmias such as atrial flutter.  She is currently wearing monitor today.  She may be having other tacky arrhythmias would like this information before starting her on treatment plan.    -Transthoracic echocardiogram ordered to evaluate heart structures and wall motion.    I spent 39 minutes caring for Lyndsay on this date of service. This time includes time spent by me in the following activities: preparing for the visit, reviewing tests, obtaining and/or reviewing a separately obtained history, performing a medically appropriate examination and/or evaluation, counseling and educating the patient/family/caregiver, ordering medications, tests, or procedures and documenting information in the medical record  Return in about 4 weeks (around 12/9/2022) for Recheck.        This document has been electronically signed by ELISABETH Das on November 11, 2022 09:33 CST   Electronically signed by ELISABETH Das, 11/11/22, 9:33 AM CST.

## 2022-11-14 RX ORDER — ASPIRIN 81 MG/1
TABLET, DELAYED RELEASE ORAL
Qty: 90 TABLET | Refills: 0 | Status: SHIPPED | OUTPATIENT
Start: 2022-11-14 | End: 2022-12-30

## 2022-12-01 ENCOUNTER — OFFICE VISIT (OUTPATIENT)
Dept: GASTROENTEROLOGY | Facility: CLINIC | Age: 82
End: 2022-12-01

## 2022-12-01 VITALS
DIASTOLIC BLOOD PRESSURE: 80 MMHG | HEIGHT: 63 IN | SYSTOLIC BLOOD PRESSURE: 157 MMHG | BODY MASS INDEX: 27 KG/M2 | WEIGHT: 152.4 LBS | HEART RATE: 93 BPM

## 2022-12-01 DIAGNOSIS — K21.9 GASTROESOPHAGEAL REFLUX DISEASE WITHOUT ESOPHAGITIS: Chronic | ICD-10-CM

## 2022-12-01 PROCEDURE — 99213 OFFICE O/P EST LOW 20 MIN: CPT | Performed by: INTERNAL MEDICINE

## 2022-12-01 RX ORDER — OMEPRAZOLE 20 MG/1
20 CAPSULE, DELAYED RELEASE ORAL DAILY
Qty: 90 CAPSULE | Refills: 2 | Status: SHIPPED | OUTPATIENT
Start: 2022-12-01

## 2022-12-01 NOTE — PROGRESS NOTES
[facility]      Chief Complaint:   Chief Complaint   Patient presents with   • Follow-up       Subjective     HPI:   Patient for follow-up on her dysphagia.  Patient states that she is markedly improved after dilatation.  Patient states she can eat what ever she wants.  Patient denies any nausea vomiting patient denies any changes in bowel habits at this time.    Plan; let patient follow-up as needed patient should stay on a proton pump inhibitor.  Discussed patient that dysphagia may return and at that time she needs to follow-up again for repeat EGD with dilatation.    Past Medical History:   Past Medical History:   Diagnosis Date   • Allergic rhinitis    • Artificial lens present     Artificial lens in position - left      • Benign hypertension    • Breast cancer (HCC)    • CKD (chronic kidney disease)    • Corns and callus    • Dry cough     most likely due to ACE inhibitor     • Elevated cholesterol    • Essential hypertension    • GERD (gastroesophageal reflux disease)    • Glaucoma, both eyes    • Hammer toe    • History of Papanicolaou smear of cervix 01/19/2004    Atrophy present.   • Malabsorption of glucose    • Nondisplaced fracture of right radial styloid process, initial encounter for closed fracture    • Nuclear senile cataract of right eye    • Nuclear senile cataract of right eye    • Obesity    • Pain in right hand    • PONV (postoperative nausea and vomiting)    • Primary open angle glaucoma     controlled left right IOP increased but OCT stable         Past Surgical History:  Past Surgical History:   Procedure Laterality Date   • BREAST SURGERY Right 11/22/1974    Cystic disease, right breast. Excision of lesion of right breast.   • ENDOSCOPY N/A 04/13/2022    Procedure: ESOPHAGOGASTRODUODENOSCOPY;  Surgeon: Luis Manuel Howard MD;  Location: [facility] ENDOSCOPY;  Service: Gastroenterology;  Laterality: N/A;   • ENDOSCOPY N/A 05/03/2022    Procedure:  ESOPHAGOGASTRODUODENOSCOPY;  Surgeon: Luis Manuel Howard MD;  Location: [facility] ENDOSCOPY;  Service: Gastroenterology;  Laterality: N/A;   • ENDOSCOPY N/A 9/20/2022    Procedure: ESOPHAGOGASTRODUODENOSCOPY;  Surgeon: Luis Manuel Howard MD;  Location: [facility] ENDOSCOPY;  Service: Gastroenterology;  Laterality: N/A;   • ENDOSCOPY AND COLONOSCOPY  02/11/2008    A few diverticula in the sigmoid.   • ESOPHAGOSCOPY / EGD  03/10/2008    Grade II esophagitis of the distal esophagus. Multiple biopsies were obtained from the distal esophagus. The stomach appeared normal. The duodenum appeared normal   • EXTERNAL EAR SURGERY Left 04/25/2001    Full thickness wedge excision of the left ear, with layered closure   • EYE SURGERY Bilateral 02/19/2013    Remove cataract, insert lens (1)    left eye    • MASTECTOMY Bilateral 01/10/1991    Total mastectomy with level I and II node dissection 2   • TRABECULECTOMY Bilateral     Glaucoma       Family History:  Family History   Problem Relation Age of Onset   • Cancer Other    • Diabetes Other    • Heart disease Other    • Hypertension Other        Social History:   reports that she has never smoked. She has never used smokeless tobacco. She reports that she does not drink alcohol and does not use drugs.    Medications:   Prior to Admission medications    Medication Sig Start Date End Date Taking? Authorizing Provider   atorvastatin (LIPITOR) 40 MG tablet Take 40 mg by mouth Every Night.   Yes Provider, MD Ashvin   Calcium-Magnesium-Vitamin D (CALCIUM 500 PO) Take 1 tablet by mouth 2 (Two) Times a Day.   Yes Emergency, Nurse KRISTIE Howell   dorzolamide-timolol (COSOPT) 22.3-6.8 MG/ML ophthalmic solution Administer 1 drop to the right eye 2 (Two) Times a Day. 11/15/19  Yes Emergency, Nurse KRISTIE Howell   HM Aspirin EC Low Dose 81 MG EC tablet TAKE 1 TABLET EVERY DAY 11/14/22  Yes Lawson Garland MD   latanoprost (XALATAN) 0.005 % ophthalmic solution Administer 1 drop to both eyes Every Night.   Yes  "Provider, MD Ashvin   losartan (COZAAR) 50 MG tablet Take 50 mg by mouth Daily. 5/28/21  Yes Emergency, Nurse Epic, RN   magnesium oxide (MAG-OX) 400 MG tablet Take 1 tablet by mouth Daily. 11/4/22  Yes Luis Manuel Hanks DO   metaxalone (Skelaxin) 800 MG tablet Take 0.5 tablets by mouth 3 (Three) Times a Day. 9/30/22  Yes Luis Manuel Howard MD   Multiple Vitamins-Minerals (MULTIVITAMIN WITH MINERALS) tablet tablet Take 1 tablet by mouth Daily.   Yes ProviderAshvin MD   Netarsudil Dimesylate (Rhopressa) 0.02 % solution Administer 1 drop to the right eye Every Night.   Yes Ashvin Segura MD   omeprazole (priLOSEC) 20 MG capsule Take 1 capsule by mouth Daily. 12/1/22  Yes Luis Manuel Howard MD   polycarbophil (FIBERCON) 625 MG tablet Take 625 mg by mouth Daily.   Yes Ashvin Segura MD   omeprazole (priLOSEC) 20 MG capsule Take 1 capsule by mouth Daily. 5/19/22 12/1/22 Yes Luis Manuel Howard MD       Allergies:  Sulfa antibiotics    ROS:    Review of Systems   Constitutional: Negative for activity change, appetite change, chills, diaphoresis, fatigue, fever and unexpected weight change.   HENT: Negative for sore throat and trouble swallowing.    Respiratory: Negative for shortness of breath.    Gastrointestinal: Negative for abdominal distention, abdominal pain, anal bleeding, blood in stool, constipation, diarrhea, nausea, rectal pain and vomiting.   Endocrine: Negative for polydipsia, polyphagia and polyuria.   Genitourinary: Negative for difficulty urinating.   Musculoskeletal: Negative for arthralgias.   Skin: Negative for pallor.   Allergic/Immunologic: Negative for food allergies.   Neurological: Negative for weakness and light-headedness.   Psychiatric/Behavioral: Negative for behavioral problems.     Objective     Blood pressure 157/80, pulse 93, height 160 cm (63\"), weight 69.1 kg (152 lb 6.4 oz), not currently breastfeeding.    Physical Exam  Constitutional:       General: She is not in acute " distress.     Appearance: She is well-developed. She is not diaphoretic.   HENT:      Head: Normocephalic and atraumatic.   Cardiovascular:      Rate and Rhythm: Normal rate and regular rhythm.      Heart sounds: Normal heart sounds. No murmur heard.    No friction rub. No gallop.   Pulmonary:      Effort: No respiratory distress.      Breath sounds: Normal breath sounds. No wheezing or rales.   Chest:      Chest wall: No tenderness.   Abdominal:      General: Bowel sounds are normal. There is no distension.      Palpations: Abdomen is soft. There is no mass.      Tenderness: There is no abdominal tenderness. There is no guarding or rebound.      Hernia: No hernia is present.   Musculoskeletal:         General: Normal range of motion.   Skin:     General: Skin is warm and dry.      Coloration: Skin is not pale.      Findings: No erythema or rash.   Neurological:      Mental Status: She is alert and oriented to person, place, and time.   Psychiatric:         Behavior: Behavior normal.         Thought Content: Thought content normal.         Judgment: Judgment normal.          Assessment & Plan   Diagnoses and all orders for this visit:    1. Gastroesophageal reflux disease without esophagitis  -     omeprazole (priLOSEC) 20 MG capsule; Take 1 capsule by mouth Daily.  Dispense: 90 capsule; Refill: 2        * Surgery not found *     Diagnosis Plan   1. Gastroesophageal reflux disease without esophagitis  omeprazole (priLOSEC) 20 MG capsule          Anticipated Surgical Procedure:  No orders of the defined types were placed in this encounter.      The risks, benefits, and alternatives of this procedure have been discussed with the patient or the responsible party- the patient understands and agrees to proceed.

## 2022-12-09 ENCOUNTER — OFFICE VISIT (OUTPATIENT)
Dept: CARDIOLOGY | Facility: CLINIC | Age: 82
End: 2022-12-09

## 2022-12-09 VITALS
DIASTOLIC BLOOD PRESSURE: 88 MMHG | OXYGEN SATURATION: 98 % | HEIGHT: 63 IN | SYSTOLIC BLOOD PRESSURE: 166 MMHG | HEART RATE: 83 BPM | BODY MASS INDEX: 28.07 KG/M2 | WEIGHT: 158.4 LBS

## 2022-12-09 DIAGNOSIS — I10 ESSENTIAL HYPERTENSION: Chronic | ICD-10-CM

## 2022-12-09 DIAGNOSIS — I47.1 PSVT (PAROXYSMAL SUPRAVENTRICULAR TACHYCARDIA): Primary | ICD-10-CM

## 2022-12-09 PROCEDURE — 99214 OFFICE O/P EST MOD 30 MIN: CPT | Performed by: NURSE PRACTITIONER

## 2022-12-09 RX ORDER — LOSARTAN POTASSIUM 25 MG/1
25 TABLET ORAL DAILY
Qty: 90 TABLET | Refills: 3 | Status: SHIPPED | OUTPATIENT
Start: 2022-12-09 | End: 2022-12-22 | Stop reason: HOSPADM

## 2022-12-09 RX ORDER — DILTIAZEM HYDROCHLORIDE 120 MG/1
120 CAPSULE, COATED, EXTENDED RELEASE ORAL DAILY
Qty: 90 CAPSULE | Refills: 3 | Status: SHIPPED | OUTPATIENT
Start: 2022-12-09 | End: 2022-12-22 | Stop reason: HOSPADM

## 2022-12-09 NOTE — PROGRESS NOTES
Paroxysmal SVT (supraventricular tachycardia)      History of Present Illness  Ms. Lyndsay Chicas/Octaviano is a 82-year-old female with medical history of hypertension, GERD, glaucoma, and esophageal stricture requiring dilatation.    She was recently seen at [facility] ER for tachycardia, EKG showed supraventricular tachycardia at rate of 167 bpm.  While in the ER they were going to perform a chemical cardioversion however she spontaneously converted to normal sinus rhythm.  Labs showed that the following: sodium 134, Chloride 94, CR 1.18, eGFR 45.6, TSH wnl, CBC unremarkable  She was sent home on magnesium oxide 1 tablet daily and with recommendation to follow up with Cardiology.    11/11/22, her daughter is present with her.     Symptoms occurred after she voted.  She felt her heart beating fast and she went home to check her blood pressure and it was high as well as her heart rate was high. Associated symptoms are shortness of air.  She experiences shortness of air with activity and while sitting.  Denies chest pain, nausea or diaphoresis.  She has experienced occasional palpitations and choking episodes before this event..  She also has esophageal stricture that is dilated on occasion.  Reports that she has had a heart murmur for a long time.  Denies previous cardiovascular problems..    Today she presents with daughter for follow up.She wore a Holter monitor for 14 days. Non-sustained atrial tachycardia noted with maximum of 150 bpm. Normal burden of premature supraventricular beats. One triggered events with no symptoms reported. Reports upper back pain, no chest pain, or SOA. She still has fluttering in her chest.      The ASCVD Risk score (Bakari DK, et al., 2019) failed to calculate for the following reasons:    The 2019 ASCVD risk score is only valid for ages 40 to 79    Cardiac Risk Factors:  hypertension and Sedentary life style    Past Medical History:   Diagnosis Date   • Allergic rhinitis    •  Artificial lens present     Artificial lens in position - left      • Benign hypertension    • Breast cancer (HCC)    • CKD (chronic kidney disease)    • Corns and callus    • Dry cough     most likely due to ACE inhibitor     • Elevated cholesterol    • Essential hypertension    • GERD (gastroesophageal reflux disease)    • Glaucoma, both eyes    • Hammer toe    • History of Papanicolaou smear of cervix 01/19/2004    Atrophy present.   • Malabsorption of glucose    • Nondisplaced fracture of right radial styloid process, initial encounter for closed fracture    • Nuclear senile cataract of right eye    • Nuclear senile cataract of right eye    • Obesity    • Pain in right hand    • PONV (postoperative nausea and vomiting)    • Primary open angle glaucoma     controlled left right IOP increased but OCT stable       Past Surgical History:   Procedure Laterality Date   • BREAST SURGERY Right 11/22/1974    Cystic disease, right breast. Excision of lesion of right breast.   • ENDOSCOPY N/A 04/13/2022    Procedure: ESOPHAGOGASTRODUODENOSCOPY;  Surgeon: Luis Manuel Howard MD;  Location: [facility] ENDOSCOPY;  Service: Gastroenterology;  Laterality: N/A;   • ENDOSCOPY N/A 05/03/2022    Procedure: ESOPHAGOGASTRODUODENOSCOPY;  Surgeon: Luis Manuel Howard MD;  Location: [facility] ENDOSCOPY;  Service: Gastroenterology;  Laterality: N/A;   • ENDOSCOPY N/A 9/20/2022    Procedure: ESOPHAGOGASTRODUODENOSCOPY;  Surgeon: Luis Manuel Howard MD;  Location: [facility] ENDOSCOPY;  Service: Gastroenterology;  Laterality: N/A;   • ENDOSCOPY AND COLONOSCOPY  02/11/2008    A few diverticula in the sigmoid.   • ESOPHAGOSCOPY / EGD  03/10/2008    Grade II esophagitis of the distal esophagus. Multiple biopsies were obtained from the distal esophagus. The stomach appeared normal. The duodenum appeared normal   • EXTERNAL EAR SURGERY Left 04/25/2001    Full thickness wedge excision of the left ear, with layered closure   • EYE SURGERY Bilateral 02/19/2013     Remove cataract, insert lens (1)    left eye    • MASTECTOMY Bilateral 01/10/1991    Total mastectomy with level I and II node dissection 2   • TRABECULECTOMY Bilateral     Glaucoma     Social History     Socioeconomic History   • Marital status:    Tobacco Use   • Smoking status: Never   • Smokeless tobacco: Never   Vaping Use   • Vaping Use: Never used   Substance and Sexual Activity   • Alcohol use: Never   • Drug use: Never   • Sexual activity: Defer     Family History   Problem Relation Age of Onset   • Cancer Other    • Diabetes Other    • Heart disease Other    • Hypertension Other        ALLERGIES:  Allergies   Allergen Reactions   • Sulfa Antibiotics Itching       Review of Systems   Constitutional: Negative for diaphoresis, malaise/fatigue and night sweats.   Eyes: Negative.    Cardiovascular: Positive for dyspnea on exertion, irregular heartbeat and palpitations. Negative for chest pain, leg swelling, near-syncope and syncope.        Fast heart rate   Respiratory: Negative for cough and shortness of breath.    Endocrine: Negative for polydipsia, polyphagia and polyuria.   Hematologic/Lymphatic: Negative for adenopathy and bleeding problem.   Musculoskeletal: Negative for falls, muscle weakness and myalgias.   Gastrointestinal: Negative for abdominal pain, heartburn and nausea.   Neurological: Negative for dizziness, headaches, light-headedness and weakness.   Psychiatric/Behavioral: Negative.    Allergic/Immunologic: Negative.      Current Outpatient Medications   Medication Sig Dispense Refill   • atorvastatin (LIPITOR) 40 MG tablet Take 40 mg by mouth Every Night.     • Calcium-Magnesium-Vitamin D (CALCIUM 500 PO) Take 1 tablet by mouth 2 (Two) Times a Day.     • dorzolamide-timolol (COSOPT) 22.3-6.8 MG/ML ophthalmic solution Administer 1 drop to the right eye 2 (Two) Times a Day.  6   • HM Aspirin EC Low Dose 81 MG EC tablet TAKE 1 TABLET EVERY DAY 90 tablet 0   • latanoprost (XALATAN) 0.005 %  ophthalmic solution Administer 1 drop to both eyes Every Night.     • magnesium oxide (MAG-OX) 400 MG tablet Take 1 tablet by mouth Daily. 14 tablet 0   • metaxalone (Skelaxin) 800 MG tablet Take 0.5 tablets by mouth 3 (Three) Times a Day. 90 tablet 5   • Multiple Vitamins-Minerals (MULTIVITAMIN WITH MINERALS) tablet tablet Take 1 tablet by mouth Daily.     • Netarsudil Dimesylate (Rhopressa) 0.02 % solution Administer 1 drop to the right eye Every Night.     • omeprazole (priLOSEC) 20 MG capsule Take 1 capsule by mouth Daily. 90 capsule 2   • polycarbophil (FIBERCON) 625 MG tablet Take 625 mg by mouth Daily.     • dilTIAZem CD (Cardizem CD) 120 MG 24 hr capsule Take 1 capsule by mouth Daily. 90 capsule 3   • losartan (Cozaar) 25 MG tablet Take 1 tablet by mouth Daily. 90 tablet 3     No current facility-administered medications for this visit.     OBJECTIVE:    Physical Exam:   Vitals reviewed.   Constitutional:       Appearance: Normal and healthy appearance. Well-developed, well-groomed, normal weight and not in distress.   Eyes:      General: Lids are normal.      Conjunctiva/sclera: Conjunctivae normal.      Right eye: Right conjunctiva is not injected.      Left eye: Left conjunctiva is not injected.   HENT:      Head: Normocephalic and atraumatic.   Pulmonary:      Effort: Pulmonary effort is normal.      Breath sounds: Normal breath sounds. No wheezing. No rhonchi.   Cardiovascular:      Normal rate. Regular rhythm. Normal S1 with normal intensity. Normal S2 with normal intensity.      Murmurs: There is no murmur.      No gallop.   Edema:     Peripheral edema absent.   Skin:     General: Skin is warm and dry.      Capillary Refill: Capillary refill takes less than 2 seconds.      Coloration: Skin is not jaundiced or pale.   Neurological:      General: No focal deficit present.      Mental Status: Alert, oriented to person, place, and time and oriented to person, place and time.      Gait: Gait is intact.  "  Psychiatric:         Attention and Perception: Attention normal.         Mood and Affect: Mood normal.         Speech: Speech normal.         Behavior: Behavior normal. Behavior is cooperative.       Vitals:    12/09/22 1351 12/09/22 1406   BP: (!) 182/86 166/88   BP Location: Left arm Left arm   Patient Position: Sitting Sitting   Cuff Size: Adult Adult   Pulse: 83    SpO2: 98%    Weight: 71.8 kg (158 lb 6.4 oz)    Height: 160 cm (63\")        DATA REVIEWED:   Results for orders placed in visit on 08/06/18    SCANNED - ECHOCARDIOGRAM      No radiology results for the last 30 days.  CXR:     CT:     Labs: BMP, CBC, LIPID, TSH  Lab Results   Component Value Date    GLUCOSE 193 (H) 11/04/2022    CALCIUM 9.9 11/04/2022     (L) 11/04/2022    K 3.7 11/04/2022    CO2 24.0 11/04/2022    CL 97 (L) 11/04/2022    BUN 20 11/04/2022    CREATININE 1.18 (H) 11/04/2022    EGFRIFNONA 57 (L) 01/31/2022    BCR 16.9 11/04/2022    ANIONGAP 13.0 11/04/2022     Lab Results   Component Value Date    WBC 8.08 11/04/2022    HGB 13.1 11/04/2022    HCT 39.5 11/04/2022    MCV 95.9 11/04/2022     11/04/2022     Lab Results   Component Value Date    CHOL 118 09/16/2021    CHOL 140 09/12/2018    CHOL 210 (H) 11/14/2017     Lab Results   Component Value Date    TRIG 86 09/16/2021    TRIG 70 09/12/2018    TRIG 57 08/05/2018     Lab Results   Component Value Date    HDL 67 (H) 09/16/2021    HDL 66 09/12/2018    HDL 73 08/05/2018     No components found for: LDLCALC  Lab Results   Component Value Date    LDL 34 09/16/2021    LDL 45 09/12/2018     08/05/2018     No results found for: HDLLDLRATIO  No components found for: CHOLHDL  Lab Results   Component Value Date    TSH 2.850 11/04/2022     Lab Results   Component Value Date    PROBNP 307.8 11/04/2022     EKG:           The following portions of the patient's history were reviewed and updated as appropriate: allergies, current medications, past family history, past medical " history, past social history, past surgical history and problem list.  Old records reviewed and pertinent information is included in the above objective data.     ASSESSMENT/PLAN:       Diagnosis Plan   1. PSVT (paroxysmal supraventricular tachycardia) (HCC)  dilTIAZem CD (Cardizem CD) 120 MG 24 hr capsule      2. Essential hypertension  dilTIAZem CD (Cardizem CD) 120 MG 24 hr capsule    losartan (Cozaar) 25 MG tablet        1.  Paroxysmal supraventricular tachycardia.  Known PSVT on prior EKG. She worse holter monitor for 14 days    1 episode of atrial tachycardia noted (20 beats) then converted to sinus Asymptomatic.     1 triggered event, correlated with sinus tachycardia    93.8% NSR and 6.13 % Sinus tach.     Will start Cardizem  mg daily to help with sinus tachycardia and HTN     as had palpitations off and on for a while.    2. Essential Hypertension. Hypertensive today. She takes losartan 50 mg daily.    Will added Cardizem  mg daily and up titrate as needed.   Decrease Losartan to 25 mg daily.   Check blood pressure at least 3 times weekly, If still elevated then can increase back to 50 mg daily. She verbalized understanding and instructed her to call the office is she has any questions or concerns.    Echo still pending. Scheduled for 2/10     I spent 31 minutes caring for Lyndsay on this date of service. This time includes time spent by me in the following activities: preparing for the visit, reviewing tests, obtaining and/or reviewing a separately obtained history, performing a medically appropriate examination and/or evaluation, counseling and educating the patient/family/caregiver, ordering medications, tests, or procedures and documenting information in the medical record  Return in about 6 weeks (around 1/20/2023) for Recheck.        This document has been electronically signed by ELISABETH Das on December 9, 2022 09:33 CST   Electronically signed by ELISABETH Das,  11/11/22, 9:33 AM CST.

## 2022-12-20 ENCOUNTER — TELEPHONE (OUTPATIENT)
Dept: CARDIOLOGY | Facility: CLINIC | Age: 82
End: 2022-12-20

## 2022-12-20 ENCOUNTER — APPOINTMENT (OUTPATIENT)
Dept: ULTRASOUND IMAGING | Facility: HOSPITAL | Age: 82
End: 2022-12-20

## 2022-12-20 ENCOUNTER — APPOINTMENT (OUTPATIENT)
Dept: GENERAL RADIOLOGY | Facility: HOSPITAL | Age: 82
End: 2022-12-20

## 2022-12-20 ENCOUNTER — APPOINTMENT (OUTPATIENT)
Dept: CT IMAGING | Facility: HOSPITAL | Age: 82
End: 2022-12-20

## 2022-12-20 ENCOUNTER — HOSPITAL ENCOUNTER (OUTPATIENT)
Facility: HOSPITAL | Age: 82
Setting detail: OBSERVATION
Discharge: HOME | End: 2022-12-22
Attending: EMERGENCY MEDICINE | Admitting: INTERNAL MEDICINE

## 2022-12-20 DIAGNOSIS — I10 ESSENTIAL HYPERTENSION: Chronic | ICD-10-CM

## 2022-12-20 DIAGNOSIS — R07.9 CHEST PAIN, UNSPECIFIED TYPE: Primary | ICD-10-CM

## 2022-12-20 DIAGNOSIS — R93.2 ABNORMAL GALLBLADDER ULTRASOUND: ICD-10-CM

## 2022-12-20 DIAGNOSIS — I10 HYPERTENSION, UNSPECIFIED TYPE: ICD-10-CM

## 2022-12-20 LAB
ALBUMIN SERPL-MCNC: 4.2 G/DL (ref 3.5–5.2)
ALBUMIN/GLOB SERPL: 1.4 G/DL
ALP SERPL-CCNC: 59 U/L (ref 39–117)
ALT SERPL W P-5'-P-CCNC: 16 U/L (ref 1–33)
ANION GAP SERPL CALCULATED.3IONS-SCNC: 9 MMOL/L (ref 5–15)
AST SERPL-CCNC: 22 U/L (ref 1–32)
BASOPHILS # BLD AUTO: 0.09 10*3/MM3 (ref 0–0.2)
BASOPHILS NFR BLD AUTO: 1.4 % (ref 0–1.5)
BILIRUB SERPL-MCNC: 0.3 MG/DL (ref 0–1.2)
BUN SERPL-MCNC: 19 MG/DL (ref 8–23)
BUN/CREAT SERPL: 18.1 (ref 7–25)
CALCIUM SPEC-SCNC: 9.6 MG/DL (ref 8.6–10.5)
CHLORIDE SERPL-SCNC: 100 MMOL/L (ref 98–107)
CO2 SERPL-SCNC: 25 MMOL/L (ref 22–29)
CREAT SERPL-MCNC: 1.05 MG/DL (ref 0.57–1)
DEPRECATED RDW RBC AUTO: 44.5 FL (ref 37–54)
EGFRCR SERPLBLD CKD-EPI 2021: 52.5 ML/MIN/1.73
EOSINOPHIL # BLD AUTO: 0.14 10*3/MM3 (ref 0–0.4)
EOSINOPHIL NFR BLD AUTO: 2.2 % (ref 0.3–6.2)
ERYTHROCYTE [DISTWIDTH] IN BLOOD BY AUTOMATED COUNT: 12.6 % (ref 12.3–15.4)
GLOBULIN UR ELPH-MCNC: 3.1 GM/DL
GLUCOSE SERPL-MCNC: 134 MG/DL (ref 65–99)
HCT VFR BLD AUTO: 37.8 % (ref 34–46.6)
HGB BLD-MCNC: 12.8 G/DL (ref 12–15.9)
HOLD SPECIMEN: NORMAL
IMM GRANULOCYTES # BLD AUTO: 0.02 10*3/MM3 (ref 0–0.05)
IMM GRANULOCYTES NFR BLD AUTO: 0.3 % (ref 0–0.5)
LIPASE SERPL-CCNC: 22 U/L (ref 13–60)
LYMPHOCYTES # BLD AUTO: 1.42 10*3/MM3 (ref 0.7–3.1)
LYMPHOCYTES NFR BLD AUTO: 22.2 % (ref 19.6–45.3)
MCH RBC QN AUTO: 32.8 PG (ref 26.6–33)
MCHC RBC AUTO-ENTMCNC: 33.9 G/DL (ref 31.5–35.7)
MCV RBC AUTO: 96.9 FL (ref 79–97)
MONOCYTES # BLD AUTO: 0.66 10*3/MM3 (ref 0.1–0.9)
MONOCYTES NFR BLD AUTO: 10.3 % (ref 5–12)
NEUTROPHILS NFR BLD AUTO: 4.08 10*3/MM3 (ref 1.7–7)
NEUTROPHILS NFR BLD AUTO: 63.6 % (ref 42.7–76)
NRBC BLD AUTO-RTO: 0 /100 WBC (ref 0–0.2)
PLATELET # BLD AUTO: 237 10*3/MM3 (ref 140–450)
PMV BLD AUTO: 10.3 FL (ref 6–12)
POTASSIUM SERPL-SCNC: 4.6 MMOL/L (ref 3.5–5.2)
PROT SERPL-MCNC: 7.3 G/DL (ref 6–8.5)
RBC # BLD AUTO: 3.9 10*6/MM3 (ref 3.77–5.28)
SODIUM SERPL-SCNC: 134 MMOL/L (ref 136–145)
TROPONIN T SERPL-MCNC: <0.01 NG/ML (ref 0–0.03)
WBC NRBC COR # BLD: 6.41 10*3/MM3 (ref 3.4–10.8)
WHOLE BLOOD HOLD COAG: NORMAL

## 2022-12-20 PROCEDURE — 84484 ASSAY OF TROPONIN QUANT: CPT | Performed by: EMERGENCY MEDICINE

## 2022-12-20 PROCEDURE — 80053 COMPREHEN METABOLIC PANEL: CPT | Performed by: EMERGENCY MEDICINE

## 2022-12-20 PROCEDURE — 25010000002 IOPAMIDOL 61 % SOLUTION: Performed by: INTERNAL MEDICINE

## 2022-12-20 PROCEDURE — 25010000002 ENOXAPARIN PER 10 MG: Performed by: INTERNAL MEDICINE

## 2022-12-20 PROCEDURE — 99202 OFFICE O/P NEW SF 15 MIN: CPT | Performed by: SURGERY

## 2022-12-20 PROCEDURE — 96372 THER/PROPH/DIAG INJ SC/IM: CPT

## 2022-12-20 PROCEDURE — 84484 ASSAY OF TROPONIN QUANT: CPT | Performed by: INTERNAL MEDICINE

## 2022-12-20 PROCEDURE — 83690 ASSAY OF LIPASE: CPT | Performed by: EMERGENCY MEDICINE

## 2022-12-20 PROCEDURE — 93005 ELECTROCARDIOGRAM TRACING: CPT | Performed by: EMERGENCY MEDICINE

## 2022-12-20 PROCEDURE — 96374 THER/PROPH/DIAG INJ IV PUSH: CPT

## 2022-12-20 PROCEDURE — 74177 CT ABD & PELVIS W/CONTRAST: CPT

## 2022-12-20 PROCEDURE — 99285 EMERGENCY DEPT VISIT HI MDM: CPT

## 2022-12-20 PROCEDURE — 96361 HYDRATE IV INFUSION ADD-ON: CPT

## 2022-12-20 PROCEDURE — G0378 HOSPITAL OBSERVATION PER HR: HCPCS

## 2022-12-20 PROCEDURE — 25010000002 HYDRALAZINE PER 20 MG: Performed by: INTERNAL MEDICINE

## 2022-12-20 PROCEDURE — 76705 ECHO EXAM OF ABDOMEN: CPT

## 2022-12-20 PROCEDURE — 36415 COLL VENOUS BLD VENIPUNCTURE: CPT | Performed by: INTERNAL MEDICINE

## 2022-12-20 PROCEDURE — 71045 X-RAY EXAM CHEST 1 VIEW: CPT

## 2022-12-20 PROCEDURE — 93005 ELECTROCARDIOGRAM TRACING: CPT

## 2022-12-20 PROCEDURE — 85025 COMPLETE CBC W/AUTO DIFF WBC: CPT | Performed by: EMERGENCY MEDICINE

## 2022-12-20 PROCEDURE — 93010 ELECTROCARDIOGRAM REPORT: CPT | Performed by: INTERNAL MEDICINE

## 2022-12-20 PROCEDURE — 99214 OFFICE O/P EST MOD 30 MIN: CPT | Performed by: INTERNAL MEDICINE

## 2022-12-20 RX ORDER — DORZOLAMIDE HYDROCHLORIDE AND TIMOLOL MALEATE 20; 5 MG/ML; MG/ML
1 SOLUTION/ DROPS OPHTHALMIC 2 TIMES DAILY
Status: DISCONTINUED | OUTPATIENT
Start: 2022-12-20 | End: 2022-12-22 | Stop reason: HOSPADM

## 2022-12-20 RX ORDER — ATORVASTATIN CALCIUM 40 MG/1
40 TABLET, FILM COATED ORAL NIGHTLY
Status: DISCONTINUED | OUTPATIENT
Start: 2022-12-20 | End: 2022-12-22 | Stop reason: HOSPADM

## 2022-12-20 RX ORDER — SODIUM CHLORIDE 9 MG/ML
75 INJECTION, SOLUTION INTRAVENOUS CONTINUOUS
Status: DISCONTINUED | OUTPATIENT
Start: 2022-12-20 | End: 2022-12-22

## 2022-12-20 RX ORDER — NITROGLYCERIN 0.4 MG/1
0.4 TABLET SUBLINGUAL
Status: DISCONTINUED | OUTPATIENT
Start: 2022-12-20 | End: 2022-12-22 | Stop reason: HOSPADM

## 2022-12-20 RX ORDER — ASPIRIN 81 MG/1
324 TABLET, CHEWABLE ORAL ONCE
Status: COMPLETED | OUTPATIENT
Start: 2022-12-20 | End: 2022-12-20

## 2022-12-20 RX ORDER — LOSARTAN POTASSIUM 25 MG/1
25 TABLET ORAL DAILY
Status: DISCONTINUED | OUTPATIENT
Start: 2022-12-20 | End: 2022-12-21

## 2022-12-20 RX ORDER — SODIUM CHLORIDE 0.9 % (FLUSH) 0.9 %
10 SYRINGE (ML) INJECTION EVERY 12 HOURS SCHEDULED
Status: DISCONTINUED | OUTPATIENT
Start: 2022-12-20 | End: 2022-12-22 | Stop reason: HOSPADM

## 2022-12-20 RX ORDER — ASPIRIN 81 MG/1
81 TABLET ORAL DAILY
Status: DISCONTINUED | OUTPATIENT
Start: 2022-12-21 | End: 2022-12-22 | Stop reason: HOSPADM

## 2022-12-20 RX ORDER — PANTOPRAZOLE SODIUM 40 MG/1
40 TABLET, DELAYED RELEASE ORAL EVERY MORNING
Status: DISCONTINUED | OUTPATIENT
Start: 2022-12-21 | End: 2022-12-22 | Stop reason: HOSPADM

## 2022-12-20 RX ORDER — LATANOPROST 50 UG/ML
1 SOLUTION/ DROPS OPHTHALMIC NIGHTLY
Status: DISCONTINUED | OUTPATIENT
Start: 2022-12-20 | End: 2022-12-22 | Stop reason: HOSPADM

## 2022-12-20 RX ORDER — HYDRALAZINE HYDROCHLORIDE 20 MG/ML
10 INJECTION INTRAMUSCULAR; INTRAVENOUS EVERY 6 HOURS PRN
Status: DISCONTINUED | OUTPATIENT
Start: 2022-12-20 | End: 2022-12-22 | Stop reason: HOSPADM

## 2022-12-20 RX ORDER — DILTIAZEM HYDROCHLORIDE 120 MG/1
120 CAPSULE, COATED, EXTENDED RELEASE ORAL DAILY
Status: DISCONTINUED | OUTPATIENT
Start: 2022-12-20 | End: 2022-12-20

## 2022-12-20 RX ORDER — SODIUM CHLORIDE 9 MG/ML
40 INJECTION, SOLUTION INTRAVENOUS AS NEEDED
Status: DISCONTINUED | OUTPATIENT
Start: 2022-12-20 | End: 2022-12-22 | Stop reason: HOSPADM

## 2022-12-20 RX ORDER — SODIUM CHLORIDE 0.9 % (FLUSH) 0.9 %
10 SYRINGE (ML) INJECTION AS NEEDED
Status: DISCONTINUED | OUTPATIENT
Start: 2022-12-20 | End: 2022-12-22 | Stop reason: HOSPADM

## 2022-12-20 RX ORDER — ENOXAPARIN SODIUM 100 MG/ML
40 INJECTION SUBCUTANEOUS DAILY
Status: DISCONTINUED | OUTPATIENT
Start: 2022-12-20 | End: 2022-12-22 | Stop reason: HOSPADM

## 2022-12-20 RX ORDER — ACETAMINOPHEN 650 MG/1
650 SUPPOSITORY RECTAL EVERY 4 HOURS PRN
Status: DISCONTINUED | OUTPATIENT
Start: 2022-12-20 | End: 2022-12-22 | Stop reason: HOSPADM

## 2022-12-20 RX ORDER — ACETAMINOPHEN 160 MG/5ML
650 SOLUTION ORAL EVERY 4 HOURS PRN
Status: DISCONTINUED | OUTPATIENT
Start: 2022-12-20 | End: 2022-12-22 | Stop reason: HOSPADM

## 2022-12-20 RX ORDER — DORZOLAMIDE HYDROCHLORIDE AND TIMOLOL MALEATE 20; 5 MG/ML; MG/ML
SOLUTION/ DROPS OPHTHALMIC 2 TIMES DAILY
Status: DISCONTINUED | OUTPATIENT
Start: 2022-12-20 | End: 2022-12-20

## 2022-12-20 RX ORDER — DILTIAZEM HYDROCHLORIDE 180 MG/1
180 CAPSULE, COATED, EXTENDED RELEASE ORAL DAILY
Status: DISCONTINUED | OUTPATIENT
Start: 2022-12-21 | End: 2022-12-22 | Stop reason: HOSPADM

## 2022-12-20 RX ORDER — ACETAMINOPHEN 325 MG/1
650 TABLET ORAL EVERY 4 HOURS PRN
Status: DISCONTINUED | OUTPATIENT
Start: 2022-12-20 | End: 2022-12-22 | Stop reason: HOSPADM

## 2022-12-20 RX ORDER — ONDANSETRON 2 MG/ML
4 INJECTION INTRAMUSCULAR; INTRAVENOUS EVERY 6 HOURS PRN
Status: DISCONTINUED | OUTPATIENT
Start: 2022-12-20 | End: 2022-12-22 | Stop reason: HOSPADM

## 2022-12-20 RX ORDER — ONDANSETRON 4 MG/1
4 TABLET, FILM COATED ORAL EVERY 6 HOURS PRN
Status: DISCONTINUED | OUTPATIENT
Start: 2022-12-20 | End: 2022-12-22 | Stop reason: HOSPADM

## 2022-12-20 RX ADMIN — SODIUM CHLORIDE 75 ML/HR: 9 INJECTION, SOLUTION INTRAVENOUS at 15:08

## 2022-12-20 RX ADMIN — Medication 10 ML: at 20:25

## 2022-12-20 RX ADMIN — ASPIRIN 243 MG: 81 TABLET, CHEWABLE ORAL at 09:45

## 2022-12-20 RX ADMIN — Medication 400 MG: at 15:09

## 2022-12-20 RX ADMIN — IOPAMIDOL 90 ML: 612 INJECTION, SOLUTION INTRAVENOUS at 18:19

## 2022-12-20 RX ADMIN — METOPROLOL TARTRATE 25 MG: 25 TABLET, FILM COATED ORAL at 20:25

## 2022-12-20 RX ADMIN — LATANOPROST 1 DROP: 50 SOLUTION OPHTHALMIC at 21:24

## 2022-12-20 RX ADMIN — Medication 10 ML: at 15:09

## 2022-12-20 RX ADMIN — ENOXAPARIN SODIUM 40 MG: 100 INJECTION SUBCUTANEOUS at 15:08

## 2022-12-20 RX ADMIN — ATORVASTATIN CALCIUM 40 MG: 40 TABLET, FILM COATED ORAL at 20:24

## 2022-12-20 RX ADMIN — HYDRALAZINE HYDROCHLORIDE 10 MG: 20 INJECTION INTRAMUSCULAR; INTRAVENOUS at 17:04

## 2022-12-20 NOTE — CONSULTS
[facility]    Referring Provider: Luis Manuel Hanks DO    Reason for Consultation: Chest pain    Patient Care Team:  Lawson Garland MD as PCP - General (Family Medicine)  Elvira Wharton, RN as Ambulatory  (Population Health)    Chief complaint   Chief Complaint   Patient presents with   • Chest Pain   • Hypertension       Subjective .     History of present illness:     Lyndsay Brumfield is an 82-year-old  female with medical history of glaucoma, hypertension, CKD,  hyperlipidemia, hiatal hernia, paroxysmal SVT who presented to the hospital with chest pain and palpitations.  Patient mentions remittent chest discomfort and palpitations since last night.  She reported getting up to use the bathroom and symptoms persisted.  Associated symptoms of nausea and shortness of breath.  Blood pressure was noted to be elevated at 187/78.  Patient denies any clear worsening or relieving factors.  She reports left-sided chest pain, short duration, non-radiating. She reports is difficult to describe.     Troponins x2 negative.  EKG showing sinus rhythm with nonspecific T wave changes, CXR clear, moderate sized hiatal hernia.  She was evaluated by Lily BARBER recently for tachycardia and palpitations. Holter monitor showing paroxysmal SVT. She was started on Cardizem CD.       The CVD Risk score (D'Agostino, et al., 2008) failed to calculate for the following reasons:    The 2008 CVD risk score is only valid for ages 30 to 74      Review of Systems  Review of Systems   Constitutional: Negative for activity change, chills, fatigue, fever and unexpected weight change.   HENT: Negative for hearing loss, nosebleeds, tinnitus, trouble swallowing and voice change.    Eyes: Negative for visual disturbance.   Respiratory: Negative for apnea, cough, chest tightness, shortness of breath and wheezing.    Cardiovascular: Positive for chest pain and palpitations. Negative for leg swelling.    Gastrointestinal: Negative for abdominal distention, abdominal pain and blood in stool.   Endocrine: Negative for cold intolerance, heat intolerance, polydipsia, polyphagia and polyuria.   Genitourinary: Negative.    Musculoskeletal: Negative for arthralgias and back pain.   Skin: Negative.    Allergic/Immunologic: Negative.    Neurological: Negative for seizures, syncope, speech difficulty, numbness and headaches.   Hematological: Negative for adenopathy. Does not bruise/bleed easily.   Psychiatric/Behavioral: Negative for agitation, behavioral problems and suicidal ideas. The patient is not nervous/anxious.        History  Past Medical History:   Diagnosis Date   • Allergic rhinitis    • Artificial lens present     Artificial lens in position - left      • Benign hypertension    • Breast cancer (HCC)    • CKD (chronic kidney disease)    • Corns and callus    • Dry cough     most likely due to ACE inhibitor     • Elevated cholesterol    • Essential hypertension    • GERD (gastroesophageal reflux disease)    • Glaucoma, both eyes    • Hammer toe    • History of Papanicolaou smear of cervix 01/19/2004    Atrophy present.   • Malabsorption of glucose    • Nondisplaced fracture of right radial styloid process, initial encounter for closed fracture    • Nuclear senile cataract of right eye    • Nuclear senile cataract of right eye    • Obesity    • Pain in right hand    • PONV (postoperative nausea and vomiting)    • Primary open angle glaucoma     controlled left right IOP increased but OCT stable     ,   Past Surgical History:   Procedure Laterality Date   • BREAST SURGERY Right 11/22/1974    Cystic disease, right breast. Excision of lesion of right breast.   • ENDOSCOPY N/A 04/13/2022    Procedure: ESOPHAGOGASTRODUODENOSCOPY;  Surgeon: Luis Manuel Howard MD;  Location: [facility] ENDOSCOPY;  Service: Gastroenterology;  Laterality: N/A;   • ENDOSCOPY N/A 05/03/2022    Procedure: ESOPHAGOGASTRODUODENOSCOPY;  Surgeon:  "Luis Manuel Howard MD;  Location: [facility] ENDOSCOPY;  Service: Gastroenterology;  Laterality: N/A;   • ENDOSCOPY N/A 9/20/2022    Procedure: ESOPHAGOGASTRODUODENOSCOPY;  Surgeon: Luis Manuel Howard MD;  Location: [facility] ENDOSCOPY;  Service: Gastroenterology;  Laterality: N/A;   • ENDOSCOPY AND COLONOSCOPY  02/11/2008    A few diverticula in the sigmoid.   • ESOPHAGOSCOPY / EGD  03/10/2008    Grade II esophagitis of the distal esophagus. Multiple biopsies were obtained from the distal esophagus. The stomach appeared normal. The duodenum appeared normal   • EXTERNAL EAR SURGERY Left 04/25/2001    Full thickness wedge excision of the left ear, with layered closure   • EYE SURGERY Bilateral 02/19/2013    Remove cataract, insert lens (1)    left eye    • MASTECTOMY Bilateral 01/10/1991    Total mastectomy with level I and II node dissection 2   • TRABECULECTOMY Bilateral     Glaucoma   ,   Family History   Problem Relation Age of Onset   • Cancer Other    • Diabetes Other    • Heart disease Other    • Hypertension Other    ,   Social History     Tobacco Use   • Smoking status: Never   • Smokeless tobacco: Never   Vaping Use   • Vaping Use: Never used   Substance Use Topics   • Alcohol use: Never   • Drug use: Never   , (Not in a hospital admission)     ALLERGIES  Sulfa antibiotics    Objective     Vital Sign Min/Max for last 24 hours  Temp  Min: 97.6 °F (36.4 °C)  Max: 97.6 °F (36.4 °C)   BP  Min: 166/77  Max: 187/79   Pulse  Min: 72  Max: 86   Resp  Min: 16  Max: 20   SpO2  Min: 97 %  Max: 100 %   No data recorded   Weight  Min: 68.9 kg (152 lb)  Max: 68.9 kg (152 lb)     Flowsheet Rows    Flowsheet Row First Filed Value   Admission Height 162.6 cm (64\") Documented at 12/20/2022 0929   Admission Weight 68.9 kg (152 lb) Documented at 12/20/2022 0929             Physical Exam:  Physical Exam  Vitals and nursing note reviewed.   Constitutional:       General: She is not in acute distress.     Appearance: Normal appearance. She " is well-developed. She is not diaphoretic.   HENT:      Head: Normocephalic.      Right Ear: External ear normal.      Left Ear: External ear normal.   Eyes:      General: Lids are normal.      Pupils: Pupils are equal, round, and reactive to light.   Neck:      Thyroid: No thyromegaly.      Vascular: No carotid bruit or JVD.      Trachea: No tracheal deviation.   Cardiovascular:      Rate and Rhythm: Normal rate and regular rhythm.      Heart sounds: Murmur heard.    Systolic murmur is present.    No friction rub. No gallop.   Pulmonary:      Effort: Pulmonary effort is normal. No respiratory distress.      Breath sounds: Normal breath sounds. No stridor. No wheezing or rales.   Chest:      Chest wall: No tenderness.   Abdominal:      General: Bowel sounds are normal. There is no distension.      Palpations: Abdomen is soft.      Tenderness: There is no abdominal tenderness.   Musculoskeletal:      Right lower leg: No edema.      Left lower leg: No edema.   Skin:     General: Skin is warm and dry.      Findings: No erythema or rash.   Neurological:      Mental Status: She is alert and oriented to person, place, and time.      Cranial Nerves: No cranial nerve deficit.      Deep Tendon Reflexes: Reflexes are normal and symmetric. Reflexes normal.   Psychiatric:         Behavior: Behavior normal.         Thought Content: Thought content normal.         Judgment: Judgment normal.            Results Review:     Results from last 7 days   Lab Units 12/20/22  1029   SODIUM mmol/L 134*   POTASSIUM mmol/L 4.6   CHLORIDE mmol/L 100   CO2 mmol/L 25.0   BUN mg/dL 19   CREATININE mg/dL 1.05*   CALCIUM mg/dL 9.6   BILIRUBIN mg/dL 0.3   ALK PHOS U/L 59   ALT (SGPT) U/L 16   AST (SGOT) U/L 22   GLUCOSE mg/dL 134*       Estimated Creatinine Clearance: 38 mL/min (A) (by C-G formula based on SCr of 1.05 mg/dL (H)).          Results from last 7 days   Lab Units 12/20/22  0941   WBC 10*3/mm3 6.41   HEMOGLOBIN g/dL 12.8   PLATELETS  10*3/mm3 237             Lab Results   Component Value Date    TROPONINI <0.012 08/05/2018    TROPONINT <0.010 12/20/2022     Lab Results   Component Value Date    PROBNP 307.8 11/04/2022       No intake/output data recorded.    Cardiographics  ECG/EMG Results (last 24 hours)     Procedure Component Value Units Date/Time    ECG 12 Lead Chest Pain [[id number]] Collected: 12/20/22 0921     Updated: 12/20/22 0926     QT Interval 362 ms      QTC Interval 425 ms     Narrative:      Test Reason : Chest Pain  Blood Pressure :   */*   mmHG  Vent. Rate :  83 BPM     Atrial Rate :  83 BPM     P-R Int : 182 ms          QRS Dur :  84 ms      QT Int : 362 ms       P-R-T Axes :  56  83  20 degrees     QTc Int : 425 ms    Normal sinus rhythm  Nonspecific T wave abnormality  Abnormal ECG  When compared with ECG of 04-NOV-2022 15:12,  Nonspecific T wave abnormality now evident in Anterior leads    Referred By:            Confirmed By:         Results for orders placed in visit on 08/06/18    SCANNED - ECHOCARDIOGRAM        US Gallbladder    Result Date: 12/20/2022  Mass within the gallbladder either a sludge ball or gallbladder neoplasm. No discrete stones are identified. Examination is otherwise unremarkable. Electronically signed by:  Jovon Peralta MD  12/20/2022 1:32 PM CST Workstation: [id number]    XR Chest 1 View    Result Date: 12/20/2022  CONCLUSION: Moderate-sized hiatal hernia. The lungs are clear of an acute process. [id number] Electronically signed by:  Jamir Castrejon MD  12/20/2022 10:04 AM CST Workstation: [id number]      Intake/Output     None            Assessment & Plan       Chest pain, unspecified type    #1. Atypical chest pain: Patient does have cardiac risk factors of hypertension, hyperlipidemia.  She previously was evaluated by Dr. Mills with treadmill stress test in 2017.  Hypertension could be contributing.  ACS has been ruled out with negative enzymes.  EKG with nonspecific T wave changes.    -NPO after  midnight  -Nuclear stress test day, risk and benefits discussed with patient and she is agreeable  -Echocardiogram to assess for EF, regional wall motion abnormalities, valvular function  -Aspirin, beta-blocker, statin    #2. HTN: Elevated at the time my exam 180/90, she is on diltiazem which was increased to 180, continue losartan 25 mg, start metoprolol tartrate 25 mg twice daily. May give PRN IV pushes as ordered.     #3.  History of paroxysmal SVT: Currently she remains in sinus rhythm with controlled ventricular rate.  Continue diltiazem and beta-blocker.  Telemetry fortmonitoring    #4. Incidental finding of gallbladder sludge ball/neoplasm, primary has consulted general surgery. CTA abdomen/pelvis pending.       I discussed the patient's findings and my recommendations with patient and Dr. Foster. Thank you for the consult, we will continue to follow.             This document has been electronically signed by ELISABETH Gill on December 20, 2022 17:36 CST     Electronically signed by ELISABETH Gill, 12/20/22, 5:37 PM CST.      I personally saw and examined Lyndsay Brumfield after the APRN.  I personally performed a history and physical examination of the patient.  I personally reviewed independent findings and plan of care.  I discussed management with the APRN.  I agree with the APRN's documentation.    82-year-old female history of hypertension, SVT has been in the hospital with chest pain.  Has been ruled out for acute coronary syndrome for chest with the setting of elevated blood pressure and has mixed features of typical antibiotic character.  Her antihypertensive medications have been adjusted and for further restratification patient undergo nuclear stress test for further evaluation.  Risk, benefits, alternatives and limitation of nuclear stress test was discussed with the patient patient is agreeable.          Electronically signed by Rico Foster MD, 12/20/22, 5:52 PM  CST.

## 2022-12-20 NOTE — ED NOTES
Nursing report ED to floor  Lyndsay Brumfield  82 y.o.  female    HPI:   Chief Complaint   Patient presents with    Chest Pain    Hypertension       Admitting doctor:   Warren Beasley MD    Consulting provider(s):  Consults       No orders found from 11/21/2022 to 12/21/2022.             Admitting diagnosis:   The primary encounter diagnosis was Chest pain, unspecified type. Diagnoses of Hypertension, unspecified type and Abnormal gallbladder ultrasound were also pertinent to this visit.    Code status:   Current Code Status       Date Active Code Status Order ID Comments User Context       Prior            Allergies:   Sulfa antibiotics    Intake and Output  No intake or output data in the 24 hours ending 12/20/22 1353    Weight:       12/20/22  0929   Weight: 68.9 kg (152 lb)       Most recent vitals:   Vitals:    12/20/22 1015 12/20/22 1045 12/20/22 1245 12/20/22 1250   BP: 173/80 174/81 (!) 187/79 166/77   Patient Position:       Pulse: 72 74 75 76   Resp:  18 18 16   Temp:       TempSrc:       SpO2: 97% 98% 98% 98%   Weight:       Height:         Oxygen Therapy: RA    Active LDAs/IV Access:   Lines, Drains & Airways       Active LDAs       Name Placement date Placement time Site Days    Peripheral IV 12/20/22 0941 Anterior;Right Forearm 12/20/22  0941  Forearm  less than 1                    Labs (abnormal labs have a star):   Labs Reviewed   COMPREHENSIVE METABOLIC PANEL - Abnormal; Notable for the following components:       Result Value    Glucose 134 (*)     Creatinine 1.05 (*)     Sodium 134 (*)     eGFR 52.5 (*)     All other components within normal limits    Narrative:     GFR Normal >60  Chronic Kidney Disease <60  Kidney Failure <15    The GFR formula is only valid for adults with stable renal function between ages 18 and 70.   LIPASE - Normal   TROPONIN (IN-HOUSE) - Normal    Narrative:     Troponin T Reference Range:  <= 0.03 ng/mL-   Negative for AMI  >0.03 ng/mL-     Abnormal for  myocardial necrosis.  Clinicians would have to utilize clinical acumen, EKG, Troponin and serial changes to determine if it is an Acute Myocardial Infarction or myocardial injury due to an underlying chronic condition.       Results may be falsely decreased if patient taking Biotin.     TROPONIN (IN-HOUSE) - Normal    Narrative:     Troponin T Reference Range:  <= 0.03 ng/mL-   Negative for AMI  >0.03 ng/mL-     Abnormal for myocardial necrosis.  Clinicians would have to utilize clinical acumen, EKG, Troponin and serial changes to determine if it is an Acute Myocardial Infarction or myocardial injury due to an underlying chronic condition.       Results may be falsely decreased if patient taking Biotin.     CBC WITH AUTO DIFFERENTIAL - Normal   CBC AND DIFFERENTIAL    Narrative:     The following orders were created for panel order CBC & Differential.  Procedure                               Abnormality         Status                     ---------                               -----------         ------                     CBC Auto Differential[[id number]]        Normal              Final result                 Please view results for these tests on the individual orders.   EXTRA TUBES    Narrative:     The following orders were created for panel order Extra Tubes.  Procedure                               Abnormality         Status                     ---------                               -----------         ------                     Gold Top - SST[[id number]]                                   Final result               Light Blue Top[[id number]]                                   Final result                 Please view results for these tests on the individual orders.   GOLD TOP - SST   LIGHT BLUE TOP       Meds given in ED:   Medications   nitroglycerin (NITROSTAT) SL tablet 0.4 mg (has no administration in time range)   atorvastatin (LIPITOR) tablet 40 mg (has no administration in time range)   dilTIAZem CD  (CARDIZEM CD) 24 hr capsule 120 mg (has no administration in time range)   dorzolamide (TRUSOPT) 2 % 1 drop, timolol (TIMOPTIC) 0.5 % 1 drop for Cosopt 22.3-6.8 mg/mL (has no administration in time range)   aspirin EC tablet 81 mg (has no administration in time range)   latanoprost (XALATAN) 0.005 % ophthalmic solution 1 drop (has no administration in time range)   losartan (COZAAR) tablet 25 mg (has no administration in time range)   magnesium oxide (MAG-OX) tablet 400 mg (has no administration in time range)   pantoprazole (PROTONIX) EC tablet 40 mg (has no administration in time range)   aspirin chewable tablet 324 mg (243 mg Oral Given 12/20/22 9345)           NIH Stroke Scale:       Isolation/Infection(s):  No active isolations   No active infections     COVID Testing  Collected no  Resulted no    Nursing report ED to floor:  Mental status: A&O x4  Ambulatory status: independent  Precautions: none    ED nurse phone extentsiez- [phone]

## 2022-12-20 NOTE — H&P
[facility] Medicine Services  INPATIENT HISTORY AND PHYSICAL       Patient Care Team:  Lawson Garland MD as PCP - General (Family Medicine)  Elvira Wharton, RN as Ambulatory  (Population Health)    Date of Admission: 12/20/2022    Primary Care Physician: Lawson Garland MD    Chief complaint   Chief Complaint   Patient presents with   • Chest Pain   • Hypertension       Subjective     Patient is a 82 y.o. female with history of hypertension, GERD, dyslipidemia, glaucoma, CKD stage II presents with intermittent and nonsustained chest discomfort which started last night and associated with nausea, shortness of air and some palpitations.  She denies fever, chills, vomiting, hematemesis, melena, hematochezia, diaphoresis, orthopnea, PND, syncope or presyncope.    On triage, she had a peak blood pressure of 187/79.  Troponins were negative x2, EKG showed normal sinus rhythm with nonspecific T wave changes, chest x-ray showed moderate sized hiatal hernia with no acute changes, CBC was unremarkable and creatinine was 1.05.   She also did complain of some early satiety and had right upper quadrant ultrasound which showed mass in the gallbladder had a sludge ball or gallbladder neoplasm.      Review of Systems   Constitutional: Positive for activity change and fatigue. Negative for appetite change, chills, diaphoresis and fever.   HENT: Negative for trouble swallowing and voice change.    Eyes: Negative for photophobia and visual disturbance.   Respiratory: Positive for chest tightness and shortness of breath. Negative for cough, choking, wheezing and stridor.    Cardiovascular: Positive for chest pain and palpitations. Negative for leg swelling.   Gastrointestinal: Negative for abdominal distention, abdominal pain, blood in stool, constipation, diarrhea, nausea and vomiting.   Endocrine: Negative for cold intolerance, heat intolerance, polydipsia, polyphagia and  polyuria.   Genitourinary: Negative for decreased urine volume, difficulty urinating, dysuria, enuresis, flank pain, frequency, hematuria and urgency.   Musculoskeletal: Negative for arthralgias, gait problem, myalgias, neck pain and neck stiffness.   Skin: Negative for pallor, rash and wound.   Neurological: Negative for dizziness, tremors, seizures, syncope, facial asymmetry, speech difficulty, weakness, light-headedness, numbness and headaches.   Hematological: Does not bruise/bleed easily.   Psychiatric/Behavioral: Negative for agitation, behavioral problems and confusion.         History  Past Medical History:   Diagnosis Date   • Allergic rhinitis    • Artificial lens present     Artificial lens in position - left      • Benign hypertension    • Breast cancer (HCC)    • CKD (chronic kidney disease)    • Corns and callus    • Dry cough     most likely due to ACE inhibitor     • Elevated cholesterol    • Essential hypertension    • GERD (gastroesophageal reflux disease)    • Glaucoma, both eyes    • Hammer toe    • History of Papanicolaou smear of cervix 01/19/2004    Atrophy present.   • Malabsorption of glucose    • Nondisplaced fracture of right radial styloid process, initial encounter for closed fracture    • Nuclear senile cataract of right eye    • Nuclear senile cataract of right eye    • Obesity    • Pain in right hand    • PONV (postoperative nausea and vomiting)    • Primary open angle glaucoma     controlled left right IOP increased but OCT stable       Past Surgical History:   Procedure Laterality Date   • BREAST SURGERY Right 11/22/1974    Cystic disease, right breast. Excision of lesion of right breast.   • ENDOSCOPY N/A 04/13/2022    Procedure: ESOPHAGOGASTRODUODENOSCOPY;  Surgeon: Luis Manuel Howard MD;  Location: [facility] ENDOSCOPY;  Service: Gastroenterology;  Laterality: N/A;   • ENDOSCOPY N/A 05/03/2022    Procedure: ESOPHAGOGASTRODUODENOSCOPY;  Surgeon: Luis Manuel Howard MD;  Location: [facility]  ENDOSCOPY;  Service: Gastroenterology;  Laterality: N/A;   • ENDOSCOPY N/A 9/20/2022    Procedure: ESOPHAGOGASTRODUODENOSCOPY;  Surgeon: Luis Manuel Howard MD;  Location: [facility] ENDOSCOPY;  Service: Gastroenterology;  Laterality: N/A;   • ENDOSCOPY AND COLONOSCOPY  02/11/2008    A few diverticula in the sigmoid.   • ESOPHAGOSCOPY / EGD  03/10/2008    Grade II esophagitis of the distal esophagus. Multiple biopsies were obtained from the distal esophagus. The stomach appeared normal. The duodenum appeared normal   • EXTERNAL EAR SURGERY Left 04/25/2001    Full thickness wedge excision of the left ear, with layered closure   • EYE SURGERY Bilateral 02/19/2013    Remove cataract, insert lens (1)    left eye    • MASTECTOMY Bilateral 01/10/1991    Total mastectomy with level I and II node dissection 2   • TRABECULECTOMY Bilateral     Glaucoma     Family History   Problem Relation Age of Onset   • Cancer Other    • Diabetes Other    • Heart disease Other    • Hypertension Other      Social History     Tobacco Use   • Smoking status: Never   • Smokeless tobacco: Never   Vaping Use   • Vaping Use: Never used   Substance Use Topics   • Alcohol use: Never   • Drug use: Never     (Not in a hospital admission)    Allergies:  Sulfa antibiotics  Prior to Admission medications    Medication Sig Start Date End Date Taking? Authorizing Provider   atorvastatin (LIPITOR) 40 MG tablet Take 40 mg by mouth Every Night.   Yes Provider, MD Ashvin   Calcium-Magnesium-Vitamin D (CALCIUM 500 PO) Take 1 tablet by mouth 2 (Two) Times a Day.   Yes Emergency, Nurse KRISTIE Howell   dilTIAZem CD (Cardizem CD) 120 MG 24 hr capsule Take 1 capsule by mouth Daily. 12/9/22  Yes Lily Cyr APRN   dorzolamide-timolol (COSOPT) 22.3-6.8 MG/ML ophthalmic solution Administer 1 drop to the right eye 2 (Two) Times a Day. 11/15/19  Yes Emergency, Nurse KRISTIE Howell   HM Aspirin EC Low Dose 81 MG EC tablet TAKE 1 TABLET EVERY DAY 11/14/22  Yes Lawson Garland MD    latanoprost (XALATAN) 0.005 % ophthalmic solution Administer 1 drop to both eyes Every Night.   Yes Ashvin Segura MD   losartan (Cozaar) 25 MG tablet Take 1 tablet by mouth Daily. 12/9/22  Yes Lily Cyr APRN   magnesium oxide (MAG-OX) 400 MG tablet Take 1 tablet by mouth Daily. 11/4/22  Yes Luis Manuel Hanks DO   metaxalone (Skelaxin) 800 MG tablet Take 0.5 tablets by mouth 3 (Three) Times a Day. 9/30/22  Yes Luis Manuel Howard MD   Multiple Vitamins-Minerals (MULTIVITAMIN WITH MINERALS) tablet tablet Take 1 tablet by mouth Daily.   Yes Ashvin Segura MD   Netarsudil Dimesylate (Rhopressa) 0.02 % solution Administer 1 drop to the right eye Every Night.   Yes Ashvin Segura MD   omeprazole (priLOSEC) 20 MG capsule Take 1 capsule by mouth Daily. 12/1/22  Yes Luis Manuel Howard MD   polycarbophil (FIBERCON) 625 MG tablet Take 625 mg by mouth Daily.   Yes ProviderAshvin MD       Objective        Vital Signs  Temp:  [97.6 °F (36.4 °C)] 97.6 °F (36.4 °C)  Heart Rate:  [72-86] 76  Resp:  [16-20] 18  BP: (166-187)/(77-89) 185/84      Physical Exam  Vitals and nursing note reviewed.   Constitutional:       General: She is not in acute distress.     Appearance: She is well-developed. She is not diaphoretic.   HENT:      Head: Normocephalic and atraumatic.      Right Ear: External ear normal.      Left Ear: External ear normal.      Nose: Nose normal.   Eyes:      General: No scleral icterus.        Right eye: No discharge.         Left eye: No discharge.      Extraocular Movements: Extraocular movements intact.      Conjunctiva/sclera: Conjunctivae normal.      Pupils: Pupils are equal, round, and reactive to light.   Neck:      Thyroid: No thyromegaly.      Vascular: No JVD.   Cardiovascular:      Rate and Rhythm: Normal rate and regular rhythm.      Heart sounds: Normal heart sounds. No murmur heard.    No friction rub. No gallop.   Pulmonary:      Effort: Pulmonary effort is normal. No  respiratory distress.      Breath sounds: Normal breath sounds. No stridor. No wheezing or rales.   Chest:      Chest wall: No tenderness.   Abdominal:      General: Bowel sounds are normal. There is no distension.      Palpations: Abdomen is soft. There is no mass.      Tenderness: There is no abdominal tenderness. There is no right CVA tenderness, left CVA tenderness, guarding or rebound.      Hernia: No hernia is present.   Musculoskeletal:         General: No swelling, tenderness, deformity or signs of injury. Normal range of motion.      Cervical back: Normal range of motion and neck supple.      Right lower leg: No edema.      Left lower leg: No edema.   Skin:     General: Skin is warm and dry.      Coloration: Skin is not jaundiced or pale.      Findings: No bruising, erythema, lesion or rash.   Neurological:      General: No focal deficit present.      Mental Status: She is alert and oriented to person, place, and time.      Cranial Nerves: No cranial nerve deficit.      Sensory: No sensory deficit.      Motor: No weakness.      Coordination: Coordination normal.      Gait: Gait normal.      Deep Tendon Reflexes: Reflexes are normal and symmetric. Reflexes normal.   Psychiatric:         Mood and Affect: Mood normal.         Behavior: Behavior normal. Behavior is cooperative.         Thought Content: Thought content normal.         Judgment: Judgment normal.           Results Review:     Results from last 7 days   Lab Units 12/20/22  1029   SODIUM mmol/L 134*   POTASSIUM mmol/L 4.6   CHLORIDE mmol/L 100   CO2 mmol/L 25.0   BUN mg/dL 19   CREATININE mg/dL 1.05*   GLUCOSE mg/dL 134*   CALCIUM mg/dL 9.6   BILIRUBIN mg/dL 0.3   ALK PHOS U/L 59   ALT (SGPT) U/L 16   AST (SGOT) U/L 22             Results from last 7 days   Lab Units 12/20/22  0941   WBC 10*3/mm3 6.41   HEMOGLOBIN g/dL 12.8   HEMATOCRIT % 37.8   PLATELETS 10*3/mm3 237           Imaging Results (Last 7 Days)     Procedure Component Value Units  Date/Time    US Gallbladder [[id number]] Collected: 12/20/22 1110     Updated: 12/20/22 1334    Narrative:      Ultrasound gallbladder, right upper quadrant.    HISTORY: Epigastric pain.    FINDINGS: Normal liver. No masses or intrahepatic biliary  dilatation.    There is a 2.9 cm echogenic mass within the dependent portion of  the gallbladder. This may represent either a sludge ball or  gallbladder neoplasm.    No discrete stones are observed.    Normal common bile duct 0.36 image.    No right upper quadrant tenderness.    Normal pancreatic head and neck. Pancreatic body and tail,  obscured by overlying bowel gas.    Normal right kidney 8.3 x 4.0 x 2.7 cm.      Impression:      Mass within the gallbladder either a sludge ball or  gallbladder neoplasm.    No discrete stones are identified.    Examination is otherwise unremarkable.    Electronically signed by:  Jovon Peralta MD  12/20/2022 1:32 PM CST  Workstation: [id number]    XR Chest 1 View [[id number]] Collected: 12/20/22 0930     Updated: 12/20/22 1007    Narrative:        PORTABLE CHEST    HISTORY: Chest pain    Portable AP upright film of the chest was obtained at 9:30 AM.  COMPARISON: November 4, 2022    FINDINGS:   EKG leads.  Moderate-sized hiatal hernia.  The lungs are clear of an acute process.  Linear scarring upper lobes.  The heart is not enlarged.  The pulmonary vasculature is not increased.  No pleural effusion.  No pneumothorax.  No acute osseous abnormality.  Degenerative changes are present in the thoracic spine.      Impression:      CONCLUSION:  Moderate-sized hiatal hernia.  The lungs are clear of an acute process.    [id number]    Electronically signed by:  Jamir Castrejon MD  12/20/2022 10:04 AM  CST Workstation: [id number]          Assessment / Plan       Hospital Problem List:    Chest pain, unspecified type    Chest pain: We will admit to rule out MI and begin guideline directed medical therapy.  Trend troponin, ECG, check echocardiogram and consult  cardiologist.    Accelerated hypertension: Continue Cozaar, Cardizem CD and make adjustments as needed to achieve optimal blood pressure control.    Glaucoma: Continue outpatient eyedrops.    Chronic kidney disease stage I/II: Creatinine is at baseline.  Continue to monitor.  Mild hyponatremia is clinically insignificant.    Incidental finding of gallbladder sludge ball/neoplasm: Consult general surgery.    Continue PPI for GERD and begin DVT prophylaxis.    Additional orders and treatment plan as hospital course dictates.    CODE STATUS was discussed with the patient and she wishes to be a full code.    I confirmed that the patient's Advance Care Plan is present, code status is documented, or surrogate decision maker is listed in the patient's medical record.     I have utilized all available immediate resources to obtain, update, or review the patient's current medications    I discussed the patient's findings and my recommendations with patient and her daughter.     Warren Beasley MD  12/20/22  14:19 CST      Dictated Utilizing Dragon Dictation

## 2022-12-20 NOTE — ED NOTES
This tech attempted to collect second troponin and was unsuccessful. Called lab to come collect. RN notified.

## 2022-12-20 NOTE — TELEPHONE ENCOUNTER
Contacted patient per Lily randolph needs to go to ER for evaluation. She voiced her understanding and said that she will go.    ----- Message from Kendall Epley, MA sent at 12/20/2022  8:31 AM CST -----  Regarding: FW: lily pt  Contact: [phone]    ----- Message -----  From: Epley, Kendall, MA  Sent: 12/20/2022   8:27 AM CST  To: ELISABETH Das  Subject: FW: lily pt                                     Been up since 2 am, HR was 107 and BP was 194/86 via automatic arm cuff. Current /101 HR 82. She has not took her medication yet but is going to now. But she was woken up due to SOB, Weak, slight chest pain but not severe so she did not want to go to ER. Chest pain comes and goes.       ----- Message -----  From: Chel Osborn  Sent: 12/20/2022   8:20 AM CST  To: [facility] [place]  Subject: lily pt                                         Having HR and BP issues since about two am.  Both high. Would like to speak to someone

## 2022-12-20 NOTE — CONSULTS
GENERAL SURGERY CONSULT    Referring Provider: Echendu  Reason for Consultation: Gallstones/ gallbladder mass    Patient Care Team:  Lawson Garland MD as PCP - General (Family Medicine)  Elvira Wharton, RN as Ambulatory  ([facility])    Chief complaint chest pain    Subjective .     History of present illness:    This is an 82 yr/o lady presenting with intermittent upper abdominal and chest discomfort associated with palpitations and nausea since last night. She reports dyspepsia sometimes following meals. Denies fever, chills, vomiting, shortness of breath, current abdominal pain, and blood in her stool. There is a suspected palpable mass in the right upper quadrant.  Preliminary cardiac workup is unrevealing aside from hypertension with initial . Labs are unremarkable. RUQ ultrasound demonstrates a 3cm mass in the gallbladder suggestive of neoplasm or ball of sludge, and normal appearing liver and common bile duct.     Review of Systems    Review of Systems  Constitutional: Negative for appetite change, chills, diaphoresis and fever.   HENT: Negative for trouble swallowing and voice change.    Eyes: Negative for photophobia and visual disturbance.   Respiratory: Positive for chest tightness and shortness of breath. Negative for cough, choking, wheezing and stridor.    Cardiovascular: Positive for chest pain and palpitations. Negative for leg swelling.   Gastrointestinal: Negative for abdominal distention, abdominal pain, blood in stool, constipation, diarrhea, nausea and vomiting.   Endocrine: Negative for cold intolerance, heat intolerance, polydipsia, polyphagia and polyuria.   Genitourinary: Negative for decreased urine volume, difficulty urinating, dysuria, enuresis, flank pain, frequency, hematuria and urgency.   Musculoskeletal: Negative for arthralgias, gait problem, myalgias, neck pain and neck stiffness.   Skin: Negative for pallor, rash and wound.   Neurological: Negative for  dizziness, tremors, seizures, syncope, facial asymmetry, speech difficulty, weakness, light-headedness, numbness and headaches.   Hematological: Does not bruise/bleed easily.   Psychiatric/Behavioral: Negative for agitation, behavioral problems and confusion.        History  Past Medical History:   Diagnosis Date   • Allergic rhinitis    • Artificial lens present     Artificial lens in position - left      • Benign hypertension    • Breast cancer (HCC)    • CKD (chronic kidney disease)    • Corns and callus    • Dry cough     most likely due to ACE inhibitor     • Elevated cholesterol    • Essential hypertension    • GERD (gastroesophageal reflux disease)    • Glaucoma, both eyes    • Hammer toe    • History of Papanicolaou smear of cervix 01/19/2004    Atrophy present.   • Malabsorption of glucose    • Nondisplaced fracture of right radial styloid process, initial encounter for closed fracture    • Nuclear senile cataract of right eye    • Nuclear senile cataract of right eye    • Obesity    • Pain in right hand    • PONV (postoperative nausea and vomiting)    • Primary open angle glaucoma     controlled left right IOP increased but OCT stable       Past Surgical History:   Procedure Laterality Date   • BREAST SURGERY Right 11/22/1974    Cystic disease, right breast. Excision of lesion of right breast.   • ENDOSCOPY N/A 04/13/2022    Procedure: ESOPHAGOGASTRODUODENOSCOPY;  Surgeon: Luis Manuel Howard MD;  Location: [facility] ENDOSCOPY;  Service: Gastroenterology;  Laterality: N/A;   • ENDOSCOPY N/A 05/03/2022    Procedure: ESOPHAGOGASTRODUODENOSCOPY;  Surgeon: Luis Manuel Howard MD;  Location: [facility] ENDOSCOPY;  Service: Gastroenterology;  Laterality: N/A;   • ENDOSCOPY N/A 9/20/2022    Procedure: ESOPHAGOGASTRODUODENOSCOPY;  Surgeon: Luis Manuel Howard MD;  Location: [facility] ENDOSCOPY;  Service: Gastroenterology;  Laterality: N/A;   • ENDOSCOPY AND COLONOSCOPY  02/11/2008    A few diverticula in the sigmoid.   •  ESOPHAGOSCOPY / EGD  03/10/2008    Grade II esophagitis of the distal esophagus. Multiple biopsies were obtained from the distal esophagus. The stomach appeared normal. The duodenum appeared normal   • EXTERNAL EAR SURGERY Left 04/25/2001    Full thickness wedge excision of the left ear, with layered closure   • EYE SURGERY Bilateral 02/19/2013    Remove cataract, insert lens (1)    left eye    • MASTECTOMY Bilateral 01/10/1991    Total mastectomy with level I and II node dissection 2   • TRABECULECTOMY Bilateral     Glaucoma     Family History   Problem Relation Age of Onset   • Cancer Other    • Diabetes Other    • Heart disease Other    • Hypertension Other      Social History     Tobacco Use   • Smoking status: Never   • Smokeless tobacco: Never   Vaping Use   • Vaping Use: Never used   Substance Use Topics   • Alcohol use: Never   • Drug use: Never     Prior to Admission medications    Medication Sig Start Date End Date Taking? Authorizing Provider   atorvastatin (LIPITOR) 40 MG tablet Take 40 mg by mouth Every Night.   Yes ProviderAshvin MD   Calcium-Magnesium-Vitamin D (CALCIUM 500 PO) Take 1 tablet by mouth 2 (Two) Times a Day.   Yes Emergency, Nurse KRISTIE Howell   dilTIAZem CD (Cardizem CD) 120 MG 24 hr capsule Take 1 capsule by mouth Daily. 12/9/22  Yes Lily Cyr APRN   dorzolamide-timolol (COSOPT) 22.3-6.8 MG/ML ophthalmic solution Administer 1 drop to the right eye 2 (Two) Times a Day. 11/15/19  Yes Emergency, Nurse KRISTIE Howell   HM Aspirin EC Low Dose 81 MG EC tablet TAKE 1 TABLET EVERY DAY 11/14/22  Yes Lawson Garland MD   latanoprost (XALATAN) 0.005 % ophthalmic solution Administer 1 drop to both eyes Every Night.   Yes ProviderAshvin MD   losartan (Cozaar) 25 MG tablet Take 1 tablet by mouth Daily. 12/9/22  Yes Lily Cyr APRN   magnesium oxide (MAG-OX) 400 MG tablet Take 1 tablet by mouth Daily. 11/4/22  Yes Luis Manuel Hanks DO   metaxalone (Skelaxin) 800 MG tablet Take 0.5  tablets by mouth 3 (Three) Times a Day. 9/30/22  Yes Luis Manuel Howard MD   Multiple Vitamins-Minerals (MULTIVITAMIN WITH MINERALS) tablet tablet Take 1 tablet by mouth Daily.   Yes Ashvin Segura MD   Netarsudil Dimesylate (Rhopressa) 0.02 % solution Administer 1 drop to the right eye Every Night.   Yes Ashvin Segura MD   omeprazole (priLOSEC) 20 MG capsule Take 1 capsule by mouth Daily. 12/1/22  Yes Luis Manuel Howard MD   polycarbophil (FIBERCON) 625 MG tablet Take 625 mg by mouth Daily.   Yes ProviderAshvin MD     Scheduled Meds:  [START ON 12/21/2022] aspirin, 81 mg, Oral, Daily  atorvastatin, 40 mg, Oral, Nightly  [START ON 12/21/2022] dilTIAZem CD, 180 mg, Oral, Daily  dorzolamide-timolol, 1 drop, Right Eye, BID  enoxaparin, 40 mg, Subcutaneous, Daily  latanoprost, 1 drop, Both Eyes, Nightly  losartan, 25 mg, Oral, Daily  magnesium oxide, 400 mg, Oral, Daily  metoprolol tartrate, 25 mg, Oral, Q12H  [START ON 12/21/2022] pantoprazole, 40 mg, Oral, QAM  sodium chloride, 10 mL, Intravenous, Q12H      Continuous Infusions:  sodium chloride, 75 mL/hr, Last Rate: 75 mL/hr (12/20/22 1535)      PRN Meds:  •  acetaminophen **OR** acetaminophen **OR** acetaminophen  •  hydrALAZINE  •  nitroglycerin  •  ondansetron **OR** ondansetron  •  sodium chloride  •  sodium chloride  Allergies:  Sulfa antibiotics    Objective     Vital Signs   Temp:  [97.6 °F (36.4 °C)] 97.6 °F (36.4 °C)  Heart Rate:  [72-86] 80  Resp:  [16-20] 18  BP: (166-187)/(77-94) 183/88    Physical Exam:    General: not sick, in no distress  CV: sinus tachycardia  Respiratory: nonlabored breathing on room air  Abdomen: soft, mild tenderness to deep palpation in right upper quadrant, small palpable mass in the RUQ, no guarding  Extremities: warm and well perfused    Results Review:  Lab Results (last 72 hours)       Procedure Component Value Units Date/Time    Troponin [[id number]]  (Normal) Collected: 12/20/22 1424    Specimen: Blood  Updated: 12/20/22 1449     Troponin T <0.010 ng/mL     Narrative:      Troponin T Reference Range:  <= 0.03 ng/mL-   Negative for AMI  >0.03 ng/mL-     Abnormal for myocardial necrosis.  Clinicians would have to utilize clinical acumen, EKG, Troponin and serial changes to determine if it is an Acute Myocardial Infarction or myocardial injury due to an underlying chronic condition.       Results may be falsely decreased if patient taking Biotin.      Troponin [[id number]]  (Normal) Collected: 12/20/22 1208    Specimen: Blood Updated: 12/20/22 1227     Troponin T <0.010 ng/mL     Narrative:      Troponin T Reference Range:  <= 0.03 ng/mL-   Negative for AMI  >0.03 ng/mL-     Abnormal for myocardial necrosis.  Clinicians would have to utilize clinical acumen, EKG, Troponin and serial changes to determine if it is an Acute Myocardial Infarction or myocardial injury due to an underlying chronic condition.       Results may be falsely decreased if patient taking Biotin.      Extra Tubes [[id number]] Collected: 12/20/22 0952    Specimen: Blood, Venous Line Updated: 12/20/22 1131    Narrative:      The following orders were created for panel order Extra Tubes.  Procedure                               Abnormality         Status                     ---------                               -----------         ------                     Gold Top - SST[[id number]]                                   Final result               Light Blue Top[[id number]]                                   Final result                 Please view results for these tests on the individual orders.    Gold Top - SST [[id number]] Collected: 12/20/22 1029    Specimen: Blood Updated: 12/20/22 1131     Extra Tube Hold for add-ons.     Comment: Auto resulted.       Light Blue Top [[id number]] Collected: 12/20/22 0952    Specimen: Blood Updated: 12/20/22 1101     Extra Tube Hold for add-ons.     Comment: Auto resulted       Comprehensive Metabolic Panel [[id number]]   (Abnormal) Collected: 12/20/22 1029    Specimen: Blood Updated: 12/20/22 1100     Glucose 134 mg/dL      BUN 19 mg/dL      Creatinine 1.05 mg/dL      Sodium 134 mmol/L      Potassium 4.6 mmol/L      Chloride 100 mmol/L      CO2 25.0 mmol/L      Calcium 9.6 mg/dL      Total Protein 7.3 g/dL      Albumin 4.20 g/dL      ALT (SGPT) 16 U/L      AST (SGOT) 22 U/L      Alkaline Phosphatase 59 U/L      Total Bilirubin 0.3 mg/dL      Globulin 3.1 gm/dL      A/G Ratio 1.4 g/dL      BUN/Creatinine Ratio 18.1     Anion Gap 9.0 mmol/L      eGFR 52.5 mL/min/1.73      Comment: National Kidney Foundation and American Society of Nephrology (ASN) Task Force recommended calculation based on the Chronic Kidney Disease Epidemiology Collaboration (CKD-EPI) equation refit without adjustment for race.       Narrative:      GFR Normal >60  Chronic Kidney Disease <60  Kidney Failure <15    The GFR formula is only valid for adults with stable renal function between ages 18 and 70.    Lipase [[id number]]  (Normal) Collected: 12/20/22 1029    Specimen: Blood Updated: 12/20/22 1100     Lipase 22 U/L     Troponin [[id number]]  (Normal) Collected: 12/20/22 1029    Specimen: Blood Updated: 12/20/22 1100     Troponin T <0.010 ng/mL     Narrative:      Troponin T Reference Range:  <= 0.03 ng/mL-   Negative for AMI  >0.03 ng/mL-     Abnormal for myocardial necrosis.  Clinicians would have to utilize clinical acumen, EKG, Troponin and serial changes to determine if it is an Acute Myocardial Infarction or myocardial injury due to an underlying chronic condition.       Results may be falsely decreased if patient taking Biotin.      CBC & Differential [[id number]]  (Normal) Collected: 12/20/22 0941    Specimen: Blood Updated: 12/20/22 0956    Narrative:      The following orders were created for panel order CBC & Differential.  Procedure                               Abnormality         Status                     ---------                                -----------         ------                     CBC Auto Differential[[id number]]        Normal              Final result                 Please view results for these tests on the individual orders.    CBC Auto Differential [[id number]]  (Normal) Collected: 12/20/22 0941    Specimen: Blood Updated: 12/20/22 0956     WBC 6.41 10*3/mm3      RBC 3.90 10*6/mm3      Hemoglobin 12.8 g/dL      Hematocrit 37.8 %      MCV 96.9 fL      MCH 32.8 pg      MCHC 33.9 g/dL      RDW 12.6 %      RDW-SD 44.5 fl      MPV 10.3 fL      Platelets 237 10*3/mm3      Neutrophil % 63.6 %      Lymphocyte % 22.2 %      Monocyte % 10.3 %      Eosinophil % 2.2 %      Basophil % 1.4 %      Immature Grans % 0.3 %      Neutrophils, Absolute 4.08 10*3/mm3      Lymphocytes, Absolute 1.42 10*3/mm3      Monocytes, Absolute 0.66 10*3/mm3      Eosinophils, Absolute 0.14 10*3/mm3      Basophils, Absolute 0.09 10*3/mm3      Immature Grans, Absolute 0.02 10*3/mm3      nRBC 0.0 /100 WBC           Imaging Results (Last 72 Hours)       Procedure Component Value Units Date/Time    US Gallbladder [[id number]] Collected: 12/20/22 1110     Updated: 12/20/22 1334    Narrative:      Ultrasound gallbladder, right upper quadrant.    HISTORY: Epigastric pain.    FINDINGS: Normal liver. No masses or intrahepatic biliary  dilatation.    There is a 2.9 cm echogenic mass within the dependent portion of  the gallbladder. This may represent either a sludge ball or  gallbladder neoplasm.    No discrete stones are observed.    Normal common bile duct 0.36 image.    No right upper quadrant tenderness.    Normal pancreatic head and neck. Pancreatic body and tail,  obscured by overlying bowel gas.    Normal right kidney 8.3 x 4.0 x 2.7 cm.      Impression:      Mass within the gallbladder either a sludge ball or  gallbladder neoplasm.    No discrete stones are identified.    Examination is otherwise unremarkable.    Electronically signed by:  Jovon Peralta MD  12/20/2022 1:32 PM  CST  Workstation: [id number]    XR Chest 1 View [[id number]] Collected: 12/20/22 0930     Updated: 12/20/22 1007    Narrative:        PORTABLE CHEST    HISTORY: Chest pain    Portable AP upright film of the chest was obtained at 9:30 AM.  COMPARISON: November 4, 2022    FINDINGS:   EKG leads.  Moderate-sized hiatal hernia.  The lungs are clear of an acute process.  Linear scarring upper lobes.  The heart is not enlarged.  The pulmonary vasculature is not increased.  No pleural effusion.  No pneumothorax.  No acute osseous abnormality.  Degenerative changes are present in the thoracic spine.      Impression:      CONCLUSION:  Moderate-sized hiatal hernia.  The lungs are clear of an acute process.    [id number]    Electronically signed by:  Jamir Castrejon MD  12/20/2022 10:04 AM  CST Workstation: [id number]            I reviewed the patient's new clinical results.  I reviewed the patient's new imaging results and agree with the interpretation.  I reviewed the patient's other test results and agree with the interpretation      Assessment & Plan       Chest pain, unspecified type    82 yr/o lady presenting with chest pain, intermittent biliary symptoms, and ultrasound suggestive of possible gallbladder adenocarcinoma versus sludge ball  - Will obtain CT scan   - Cardiac workup including echo and stress test per cardiology   - The patient will likely need cholecystectomy given concern for gallbladder cancer     I discussed the patient's findings and my recommendations with patient      I am very concerned that I can feel a mass in the RUQ corresponding to the GB. CT is ordered.        This document has been electronically signed by Brent Rosales MD on December 20, 2022 17:45 CST      Brent Rosales MD  12/20/22  17:45 CST    I have reviewed the above documentation and concur with Dr. Rosales's findings.  I feel that she will likely need cholecystectomy to sort all this out and that can be done next week once her current medical  problems are under better control

## 2022-12-21 ENCOUNTER — APPOINTMENT (OUTPATIENT)
Dept: CARDIOLOGY | Facility: HOSPITAL | Age: 82
End: 2022-12-21

## 2022-12-21 ENCOUNTER — APPOINTMENT (OUTPATIENT)
Dept: NUCLEAR MEDICINE | Facility: HOSPITAL | Age: 82
End: 2022-12-21

## 2022-12-21 LAB
ALBUMIN SERPL-MCNC: 4 G/DL (ref 3.5–5.2)
ALBUMIN/GLOB SERPL: 1.5 G/DL
ALP SERPL-CCNC: 55 U/L (ref 39–117)
ALT SERPL W P-5'-P-CCNC: 13 U/L (ref 1–33)
ANION GAP SERPL CALCULATED.3IONS-SCNC: 11 MMOL/L (ref 5–15)
AST SERPL-CCNC: 19 U/L (ref 1–32)
BASOPHILS # BLD AUTO: 0.04 10*3/MM3 (ref 0–0.2)
BASOPHILS NFR BLD AUTO: 0.5 % (ref 0–1.5)
BH CV ECHO MEAS - ACS: 1.73 CM
BH CV ECHO MEAS - AO MAX PG: 12 MMHG
BH CV ECHO MEAS - AO MEAN PG: 7.1 MMHG
BH CV ECHO MEAS - AO ROOT DIAM: 2.8 CM
BH CV ECHO MEAS - AO V2 MAX: 173.4 CM/SEC
BH CV ECHO MEAS - AO V2 VTI: 36.4 CM
BH CV ECHO MEAS - AVA(I,D): 2.02 CM2
BH CV ECHO MEAS - EDV(CUBED): 46.3 ML
BH CV ECHO MEAS - EDV(MOD-SP2): 49.9 ML
BH CV ECHO MEAS - EDV(MOD-SP4): 64.9 ML
BH CV ECHO MEAS - EF(MOD-BP): 59.1 %
BH CV ECHO MEAS - EF(MOD-SP2): 55.1 %
BH CV ECHO MEAS - EF(MOD-SP4): 63.2 %
BH CV ECHO MEAS - ESV(CUBED): 14.5 ML
BH CV ECHO MEAS - ESV(MOD-SP2): 22.4 ML
BH CV ECHO MEAS - ESV(MOD-SP4): 23.9 ML
BH CV ECHO MEAS - FS: 32.2 %
BH CV ECHO MEAS - IVS/LVPW: 1.09 CM
BH CV ECHO MEAS - IVSD: 1.08 CM
BH CV ECHO MEAS - LA DIMENSION: 3.4 CM
BH CV ECHO MEAS - LAT PEAK E' VEL: 10.1 CM/SEC
BH CV ECHO MEAS - LV DIASTOLIC VOL/BSA (35-75): 37.5 CM2
BH CV ECHO MEAS - LV MASS(C)D: 113.6 GRAMS
BH CV ECHO MEAS - LV MAX PG: 6.1 MMHG
BH CV ECHO MEAS - LV MEAN PG: 3.5 MMHG
BH CV ECHO MEAS - LV SYSTOLIC VOL/BSA (12-30): 13.8 CM2
BH CV ECHO MEAS - LV V1 MAX: 123.6 CM/SEC
BH CV ECHO MEAS - LV V1 VTI: 27.2 CM
BH CV ECHO MEAS - LVIDD: 3.6 CM
BH CV ECHO MEAS - LVIDS: 2.44 CM
BH CV ECHO MEAS - LVOT AREA: 2.7 CM2
BH CV ECHO MEAS - LVOT DIAM: 1.85 CM
BH CV ECHO MEAS - LVPWD: 0.99 CM
BH CV ECHO MEAS - MED PEAK E' VEL: 5.1 CM/SEC
BH CV ECHO MEAS - MR MAX PG: 160.9 MMHG
BH CV ECHO MEAS - MR MAX VEL: 634.3 CM/SEC
BH CV ECHO MEAS - MV A MAX VEL: 95.5 CM/SEC
BH CV ECHO MEAS - MV DEC SLOPE: 602.1 CM/SEC2
BH CV ECHO MEAS - MV E MAX VEL: 105 CM/SEC
BH CV ECHO MEAS - MV E/A: 1.1
BH CV ECHO MEAS - MV MAX PG: 5.7 MMHG
BH CV ECHO MEAS - MV MEAN PG: 2.35 MMHG
BH CV ECHO MEAS - MV P1/2T: 57.3 MSEC
BH CV ECHO MEAS - MV V2 VTI: 27.5 CM
BH CV ECHO MEAS - MVA(P1/2T): 3.8 CM2
BH CV ECHO MEAS - MVA(VTI): 2.7 CM2
BH CV ECHO MEAS - PA V2 MAX: 107.1 CM/SEC
BH CV ECHO MEAS - RAP SYSTOLE: 10 MMHG
BH CV ECHO MEAS - RVDD: 2.6 CM
BH CV ECHO MEAS - RVSP: 51.2 MMHG
BH CV ECHO MEAS - SI(MOD-SP2): 15.9 ML/M2
BH CV ECHO MEAS - SI(MOD-SP4): 23.7 ML/M2
BH CV ECHO MEAS - SV(LVOT): 73.6 ML
BH CV ECHO MEAS - SV(MOD-SP2): 27.5 ML
BH CV ECHO MEAS - SV(MOD-SP4): 41 ML
BH CV ECHO MEAS - TR MAX PG: 41.2 MMHG
BH CV ECHO MEAS - TR MAX VEL: 320.8 CM/SEC
BH CV ECHO MEASUREMENTS AVERAGE E/E' RATIO: 13.82
BH CV REST NUCLEAR ISOTOPE DOSE: 10.4 MCI
BH CV STRESS COMMENTS STAGE 1: NORMAL
BH CV STRESS DOSE REGADENOSON STAGE 1: 0.4
BH CV STRESS DURATION MIN STAGE 1: 0
BH CV STRESS DURATION SEC STAGE 1: 10
BH CV STRESS HR STAGE 1: 75
BH CV STRESS NUCLEAR ISOTOPE DOSE: 34 MCI
BH CV STRESS PROTOCOL 1: NORMAL
BH CV STRESS RECOVERY BP: NORMAL MMHG
BH CV STRESS RECOVERY HR: 101 BPM
BH CV STRESS STAGE 1: 1
BILIRUB SERPL-MCNC: 0.4 MG/DL (ref 0–1.2)
BUN SERPL-MCNC: 16 MG/DL (ref 8–23)
BUN/CREAT SERPL: 18.2 (ref 7–25)
CALCIUM SPEC-SCNC: 9.3 MG/DL (ref 8.6–10.5)
CHLORIDE SERPL-SCNC: 101 MMOL/L (ref 98–107)
CO2 SERPL-SCNC: 22 MMOL/L (ref 22–29)
CREAT SERPL-MCNC: 0.88 MG/DL (ref 0.57–1)
DEPRECATED RDW RBC AUTO: 42.5 FL (ref 37–54)
EGFRCR SERPLBLD CKD-EPI 2021: 64.9 ML/MIN/1.73
EOSINOPHIL # BLD AUTO: 0.06 10*3/MM3 (ref 0–0.4)
EOSINOPHIL NFR BLD AUTO: 0.8 % (ref 0.3–6.2)
ERYTHROCYTE [DISTWIDTH] IN BLOOD BY AUTOMATED COUNT: 12.1 % (ref 12.3–15.4)
GLOBULIN UR ELPH-MCNC: 2.6 GM/DL
GLUCOSE SERPL-MCNC: 121 MG/DL (ref 65–99)
HCT VFR BLD AUTO: 35.2 % (ref 34–46.6)
HGB BLD-MCNC: 11.8 G/DL (ref 12–15.9)
IMM GRANULOCYTES # BLD AUTO: 0.01 10*3/MM3 (ref 0–0.05)
IMM GRANULOCYTES NFR BLD AUTO: 0.1 % (ref 0–0.5)
LEFT ATRIUM VOLUME INDEX: 28.2 ML/M2
LYMPHOCYTES # BLD AUTO: 1.74 10*3/MM3 (ref 0.7–3.1)
LYMPHOCYTES NFR BLD AUTO: 23.7 % (ref 19.6–45.3)
MAXIMAL PREDICTED HEART RATE: 136 BPM
MAXIMAL PREDICTED HEART RATE: 136 BPM
MCH RBC QN AUTO: 32 PG (ref 26.6–33)
MCHC RBC AUTO-ENTMCNC: 33.5 G/DL (ref 31.5–35.7)
MCV RBC AUTO: 95.4 FL (ref 79–97)
MONOCYTES # BLD AUTO: 0.69 10*3/MM3 (ref 0.1–0.9)
MONOCYTES NFR BLD AUTO: 9.4 % (ref 5–12)
NEUTROPHILS NFR BLD AUTO: 4.8 10*3/MM3 (ref 1.7–7)
NEUTROPHILS NFR BLD AUTO: 65.5 % (ref 42.7–76)
NRBC BLD AUTO-RTO: 0 /100 WBC (ref 0–0.2)
PERCENT MAX PREDICTED HR: 77.21 %
PLATELET # BLD AUTO: 223 10*3/MM3 (ref 140–450)
PMV BLD AUTO: 10.5 FL (ref 6–12)
POTASSIUM SERPL-SCNC: 4 MMOL/L (ref 3.5–5.2)
PROT SERPL-MCNC: 6.6 G/DL (ref 6–8.5)
RBC # BLD AUTO: 3.69 10*6/MM3 (ref 3.77–5.28)
SODIUM SERPL-SCNC: 134 MMOL/L (ref 136–145)
STJ: 2.42 CM
STRESS BASELINE BP: NORMAL MMHG
STRESS BASELINE HR: 75 BPM
STRESS PERCENT HR: 91 %
STRESS POST EXERCISE DUR SEC: 28 SEC
STRESS POST PEAK BP: NORMAL MMHG
STRESS POST PEAK HR: 105 BPM
STRESS TARGET HR: 116 BPM
STRESS TARGET HR: 116 BPM
WBC NRBC COR # BLD: 7.34 10*3/MM3 (ref 3.4–10.8)

## 2022-12-21 PROCEDURE — 93306 TTE W/DOPPLER COMPLETE: CPT | Performed by: INTERNAL MEDICINE

## 2022-12-21 PROCEDURE — A9500 TC99M SESTAMIBI: HCPCS | Performed by: INTERNAL MEDICINE

## 2022-12-21 PROCEDURE — 78452 HT MUSCLE IMAGE SPECT MULT: CPT

## 2022-12-21 PROCEDURE — G0378 HOSPITAL OBSERVATION PER HR: HCPCS

## 2022-12-21 PROCEDURE — 80053 COMPREHEN METABOLIC PANEL: CPT | Performed by: INTERNAL MEDICINE

## 2022-12-21 PROCEDURE — 96372 THER/PROPH/DIAG INJ SC/IM: CPT

## 2022-12-21 PROCEDURE — 78452 HT MUSCLE IMAGE SPECT MULT: CPT | Performed by: INTERNAL MEDICINE

## 2022-12-21 PROCEDURE — 25010000002 ENOXAPARIN PER 10 MG: Performed by: INTERNAL MEDICINE

## 2022-12-21 PROCEDURE — 85025 COMPLETE CBC W/AUTO DIFF WBC: CPT | Performed by: INTERNAL MEDICINE

## 2022-12-21 PROCEDURE — 93018 CV STRESS TEST I&R ONLY: CPT | Performed by: INTERNAL MEDICINE

## 2022-12-21 PROCEDURE — 93017 CV STRESS TEST TRACING ONLY: CPT

## 2022-12-21 PROCEDURE — 99214 OFFICE O/P EST MOD 30 MIN: CPT | Performed by: INTERNAL MEDICINE

## 2022-12-21 PROCEDURE — 0 TECHNETIUM SESTAMIBI: Performed by: INTERNAL MEDICINE

## 2022-12-21 PROCEDURE — 93306 TTE W/DOPPLER COMPLETE: CPT

## 2022-12-21 PROCEDURE — 93016 CV STRESS TEST SUPVJ ONLY: CPT | Performed by: INTERNAL MEDICINE

## 2022-12-21 PROCEDURE — 25010000002 REGADENOSON 0.4 MG/5ML SOLUTION: Performed by: NURSE PRACTITIONER

## 2022-12-21 PROCEDURE — 99024 POSTOP FOLLOW-UP VISIT: CPT | Performed by: SURGERY

## 2022-12-21 RX ORDER — SODIUM CHLORIDE 0.9 % (FLUSH) 0.9 %
10 SYRINGE (ML) INJECTION ONCE
Status: COMPLETED | OUTPATIENT
Start: 2022-12-21 | End: 2022-12-21

## 2022-12-21 RX ORDER — LOSARTAN POTASSIUM 50 MG/1
50 TABLET ORAL DAILY
Status: DISCONTINUED | OUTPATIENT
Start: 2022-12-22 | End: 2022-12-22 | Stop reason: HOSPADM

## 2022-12-21 RX ADMIN — TECHNETIUM TC 99M SESTAMIBI 1 DOSE: 1 INJECTION INTRAVENOUS at 10:41

## 2022-12-21 RX ADMIN — METOPROLOL TARTRATE 25 MG: 25 TABLET, FILM COATED ORAL at 11:55

## 2022-12-21 RX ADMIN — SODIUM CHLORIDE 75 ML/HR: 9 INJECTION, SOLUTION INTRAVENOUS at 05:21

## 2022-12-21 RX ADMIN — DILTIAZEM HYDROCHLORIDE 180 MG: 180 CAPSULE, COATED, EXTENDED RELEASE ORAL at 11:55

## 2022-12-21 RX ADMIN — ATORVASTATIN CALCIUM 40 MG: 40 TABLET, FILM COATED ORAL at 21:05

## 2022-12-21 RX ADMIN — REGADENOSON 0.4 MG: 0.08 INJECTION, SOLUTION INTRAVENOUS at 10:40

## 2022-12-21 RX ADMIN — Medication 10 ML: at 11:58

## 2022-12-21 RX ADMIN — LATANOPROST 1 DROP: 50 SOLUTION OPHTHALMIC at 21:06

## 2022-12-21 RX ADMIN — METOPROLOL TARTRATE 25 MG: 25 TABLET, FILM COATED ORAL at 21:05

## 2022-12-21 RX ADMIN — Medication 10 ML: at 10:40

## 2022-12-21 RX ADMIN — ENOXAPARIN SODIUM 40 MG: 100 INJECTION SUBCUTANEOUS at 11:55

## 2022-12-21 RX ADMIN — DORZOLAMIDE HYDROCHLORIDE AND TIMOLOL MALEATE 1 DROP: 20; 5 SOLUTION/ DROPS OPHTHALMIC at 21:06

## 2022-12-21 RX ADMIN — Medication 10 ML: at 21:06

## 2022-12-21 RX ADMIN — ASPIRIN 81 MG: 81 TABLET, FILM COATED ORAL at 11:55

## 2022-12-21 RX ADMIN — TECHNETIUM TC 99M SESTAMIBI 1 DOSE: 1 INJECTION INTRAVENOUS at 09:34

## 2022-12-21 RX ADMIN — Medication 400 MG: at 11:56

## 2022-12-21 RX ADMIN — DORZOLAMIDE HYDROCHLORIDE AND TIMOLOL MALEATE 1 DROP: 20; 5 SOLUTION/ DROPS OPHTHALMIC at 11:56

## 2022-12-21 RX ADMIN — LOSARTAN POTASSIUM 25 MG: 25 TABLET, FILM COATED ORAL at 11:55

## 2022-12-21 NOTE — PROGRESS NOTES
GENERAL SURGERY PROGRESS NOTE     LOS: 0 days     Chief Complaint:     Chest pain    Interval History:     Denies current chest pain, abdominal pain, and nausea. No new issues overnight. Has remained NPO    Medication Review:   aspirin, 81 mg, Oral, Daily  atorvastatin, 40 mg, Oral, Nightly  dilTIAZem CD, 180 mg, Oral, Daily  dorzolamide-timolol, 1 drop, Right Eye, BID  enoxaparin, 40 mg, Subcutaneous, Daily  latanoprost, 1 drop, Both Eyes, Nightly  losartan, 25 mg, Oral, Daily  magnesium oxide, 400 mg, Oral, Daily  metoprolol tartrate, 25 mg, Oral, Q12H  pantoprazole, 40 mg, Oral, QAM  sodium chloride, 10 mL, Intravenous, Q12H        sodium chloride, 75 mL/hr, Last Rate: 75 mL/hr (12/21/22 0521)    Objective     Vital Signs:  Temp:  [96.8 °F (36 °C)-98 °F (36.7 °C)] 97.5 °F (36.4 °C)  Heart Rate:  [] 82  Resp:  [16-20] 16  BP: (132-187)/(61-94) 169/73    Intake/Output Summary (Last 24 hours) at 12/21/2022 0753  Last data filed at 12/21/2022 0738  Gross per 24 hour   Intake 0 ml   Output 400 ml   Net -400 ml       Physical Exam  General: not sick, in no distress  CV: normal rate, regular rhythm  Respiratory: nonlabored breathing on room air  Abdomen: soft, mild tender ness to deep palpation in RUQ, RUQ fullness & possible mass, no guarding  Extremities: warm and well perfused  Results Review:    Results from last 7 days   Lab Units 12/21/22  0546 12/20/22  1029   SODIUM mmol/L 134* 134*   POTASSIUM mmol/L 4.0 4.6   CHLORIDE mmol/L 101 100   CO2 mmol/L 22.0 25.0   BUN mg/dL 16 19   CREATININE mg/dL 0.88 1.05*   GLUCOSE mg/dL 121* 134*   CALCIUM mg/dL 9.3 9.6     Results from last 7 days   Lab Units 12/21/22  0546 12/20/22  0941   WBC 10*3/mm3 7.34 6.41   HEMOGLOBIN g/dL 11.8* 12.8   HEMATOCRIT % 35.2 37.8   PLATELETS 10*3/mm3 223 237       Assessment:    Chest pain, unspecified type      82 yr/o lady presenting with chest pain and intermittent biliary symptoms found to have possible gallbladder  mass    Plan:    - Will review imaging with radiology  - Cardiac workup today  - Suspect hiatal hernia may be contributing to symptoms, however would be hesitant to repair given her age and state of health  - The patient will likely need cholecystectomy       This document has been electronically signed by Brent Rosales MD on December 21, 2022 07:53 CST

## 2022-12-21 NOTE — PROGRESS NOTES
[facility] Medicine Services  INPATIENT PROGRESS NOTE    Length of Stay: 0  Date of Admission: 12/20/2022  Primary Care Physician: Lawson Garland MD    Subjective   Chief Complaint: No new complaints.    HPI: Patient is seen for follow-up today 12/21/2022.  She is a 82 y.o. 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    She is doing better, remains chest pain-free and awaiting stress test.  She voices no new complaints.    Review of Systems   Constitutional: Positive for fatigue. Negative for activity change, appetite change, chills, diaphoresis and fever.   HENT: Negative for trouble swallowing and voice change.    Eyes: Negative for photophobia and visual disturbance.   Respiratory: Negative for cough, choking, chest tightness, shortness of breath, wheezing and stridor.    Cardiovascular: Negative for chest pain, palpitations and leg swelling.   Gastrointestinal: Negative for abdominal distention, abdominal pain, blood in stool, constipation, diarrhea, nausea and vomiting.   Endocrine: Negative for cold intolerance, heat intolerance, polydipsia, polyphagia and polyuria.   Genitourinary: Negative for decreased urine volume, difficulty urinating, dysuria, enuresis, flank pain, frequency, hematuria and urgency.   Musculoskeletal: Negative for arthralgias, gait problem, myalgias, neck pain and neck stiffness.   Skin: Negative for pallor, rash and wound.   Neurological: Negative for dizziness, tremors, seizures, syncope, facial asymmetry, speech difficulty, weakness, light-headedness, numbness and headaches.   Hematological: Does not bruise/bleed easily.   Psychiatric/Behavioral: Negative for agitation, behavioral problems and confusion.       Objective    Temp:  [96.8 °F (36 °C)-98 °F (36.7 °C)] 97.5 °F (36.4 °C)  Heart Rate:  [] 82  Resp:  [16-18] 16  BP:  (132-187)/(61-94) 169/73         Physical Exam  Vitals and nursing note reviewed.   Constitutional:       General: She is not in acute distress.     Appearance: She is well-developed. She is not diaphoretic.   HENT:      Head: Normocephalic and atraumatic.      Right Ear: External ear normal.      Left Ear: External ear normal.      Nose: Nose normal.   Eyes:      General: No scleral icterus.        Right eye: No discharge.         Left eye: No discharge.      Extraocular Movements: Extraocular movements intact.      Conjunctiva/sclera: Conjunctivae normal.      Pupils: Pupils are equal, round, and reactive to light.   Neck:      Thyroid: No thyromegaly.      Vascular: No JVD.   Cardiovascular:      Rate and Rhythm: Normal rate and regular rhythm.      Heart sounds: Normal heart sounds. No murmur heard.    No friction rub. No gallop.   Pulmonary:      Effort: Pulmonary effort is normal. No respiratory distress.      Breath sounds: Normal breath sounds. No stridor. No wheezing or rales.   Chest:      Chest wall: No tenderness.   Abdominal:      General: Bowel sounds are normal. There is no distension.      Palpations: Abdomen is soft. There is no mass.      Tenderness: There is no abdominal tenderness. There is no right CVA tenderness, left CVA tenderness, guarding or rebound.      Hernia: No hernia is present.   Musculoskeletal:         General: No swelling, tenderness, deformity or signs of injury. Normal range of motion.      Cervical back: Normal range of motion and neck supple.      Right lower leg: No edema.      Left lower leg: No edema.   Skin:     General: Skin is warm and dry.      Coloration: Skin is not jaundiced or pale.      Findings: No bruising, erythema, lesion or rash.   Neurological:      General: No focal deficit present.      Mental Status: She is alert and oriented to person, place, and time.      Cranial Nerves: No cranial nerve deficit.      Sensory: No sensory deficit.      Motor: No  weakness.      Coordination: Coordination normal.      Gait: Gait normal.      Deep Tendon Reflexes: Reflexes are normal and symmetric. Reflexes normal.   Psychiatric:         Mood and Affect: Mood normal.         Behavior: Behavior normal. Behavior is cooperative.         Thought Content: Thought content normal.         Judgment: Judgment normal.           Medication Review:    Current Facility-Administered Medications:   •  acetaminophen (TYLENOL) tablet 650 mg, 650 mg, Oral, Q4H PRN **OR** acetaminophen (TYLENOL) 160 MG/5ML solution 650 mg, 650 mg, Oral, Q4H PRN **OR** acetaminophen (TYLENOL) suppository 650 mg, 650 mg, Rectal, Q4H PRN, Warren Beasley MD  •  aspirin EC tablet 81 mg, 81 mg, Oral, Daily, Warren Beasley MD  •  atorvastatin (LIPITOR) tablet 40 mg, 40 mg, Oral, Nightly, Warren Beasley MD, 40 mg at 12/20/22 2024  •  dilTIAZem CD (CARDIZEM CD) 24 hr capsule 180 mg, 180 mg, Oral, Daily, Warren Beasley MD  •  dorzolamide-timolol (COSOPT) ophthalmic solution 1 drop, 1 drop, Right Eye, BID, Warren Beasley MD  •  Enoxaparin Sodium (LOVENOX) syringe 40 mg, 40 mg, Subcutaneous, Daily, Warren Beasley MD, 40 mg at 12/20/22 1508  •  hydrALAZINE (APRESOLINE) injection 10 mg, 10 mg, Intravenous, Q6H PRN, Warren Beasley MD, 10 mg at 12/20/22 1704  •  latanoprost (XALATAN) 0.005 % ophthalmic solution 1 drop, 1 drop, Both Eyes, Nightly, Warren Beasley MD, 1 drop at 12/20/22 2124  •  losartan (COZAAR) tablet 25 mg, 25 mg, Oral, Daily, Warren Beasley MD  •  magnesium oxide (MAG-OX) tablet 400 mg, 400 mg, Oral, Daily, Warren Beasley MD, 400 mg at 12/20/22 1509  •  metoprolol tartrate (LOPRESSOR) tablet 25 mg, 25 mg, Oral, Q12H, Karime Patel APRN, 25 mg at 12/20/22 2025  •  nitroglycerin (NITROSTAT) SL tablet 0.4 mg, 0.4 mg, Sublingual, Q5 Min PRN, Warren Beaslye MD  •  ondansetron (ZOFRAN) tablet 4 mg, 4 mg, Oral, Q6H PRN **OR** ondansetron (ZOFRAN) injection  4 mg, 4 mg, Intravenous, Q6H PRN, Warren Beasley MD  •  pantoprazole (PROTONIX) EC tablet 40 mg, 40 mg, Oral, QAM, Warren Beasley MD  •  sodium chloride 0.9 % flush 10 mL, 10 mL, Intravenous, Q12H, Warren Beasley MD, 10 mL at 12/20/22 2025  •  sodium chloride 0.9 % flush 10 mL, 10 mL, Intravenous, PRN, Warren Beasley MD  •  sodium chloride 0.9 % infusion 40 mL, 40 mL, Intravenous, PRN, Warren Beasley MD  •  sodium chloride 0.9 % infusion, 75 mL/hr, Intravenous, Continuous, Warren Beasley MD, Last Rate: 75 mL/hr at 12/21/22 0852, 75 mL/hr at 12/21/22 0852    Results Review:  I have reviewed the labs, radiology results, and diagnostic studies.    Laboratory Data:   Results from last 7 days   Lab Units 12/21/22  0546 12/20/22  1029   SODIUM mmol/L 134* 134*   POTASSIUM mmol/L 4.0 4.6   CHLORIDE mmol/L 101 100   CO2 mmol/L 22.0 25.0   BUN mg/dL 16 19   CREATININE mg/dL 0.88 1.05*   GLUCOSE mg/dL 121* 134*   CALCIUM mg/dL 9.3 9.6   BILIRUBIN mg/dL 0.4 0.3   ALK PHOS U/L 55 59   ALT (SGPT) U/L 13 16   AST (SGOT) U/L 19 22   ANION GAP mmol/L 11.0 9.0     Estimated Creatinine Clearance: 45.2 mL/min (by C-G formula based on SCr of 0.88 mg/dL).          Results from last 7 days   Lab Units 12/21/22  0546 12/20/22  0941   WBC 10*3/mm3 7.34 6.41   HEMOGLOBIN g/dL 11.8* 12.8   HEMATOCRIT % 35.2 37.8   PLATELETS 10*3/mm3 223 237           Culture Data:   No results found for: BLOODCX  No results found for: URINECX  No results found for: RESPCX  No results found for: WOUNDCX  No results found for: STOOLCX  No components found for: BODYFLD    Radiology Data:   Imaging Results (Last 24 Hours)     Procedure Component Value Units Date/Time    CT Abdomen Pelvis With Contrast [[id number]] Resulted: 12/20/22 1817     Updated: 12/20/22 1820    US Gallbladder [[id number]] Collected: 12/20/22 1110     Updated: 12/20/22 1334    Narrative:      Ultrasound gallbladder, right upper quadrant.    HISTORY: Epigastric  pain.    FINDINGS: Normal liver. No masses or intrahepatic biliary  dilatation.    There is a 2.9 cm echogenic mass within the dependent portion of  the gallbladder. This may represent either a sludge ball or  gallbladder neoplasm.    No discrete stones are observed.    Normal common bile duct 0.36 image.    No right upper quadrant tenderness.    Normal pancreatic head and neck. Pancreatic body and tail,  obscured by overlying bowel gas.    Normal right kidney 8.3 x 4.0 x 2.7 cm.      Impression:      Mass within the gallbladder either a sludge ball or  gallbladder neoplasm.    No discrete stones are identified.    Examination is otherwise unremarkable.    Electronically signed by:  Jovon Peralta MD  12/20/2022 1:32 PM CST  Workstation: [id number]    XR Chest 1 View [[id number]] Collected: 12/20/22 0930     Updated: 12/20/22 1007    Narrative:        PORTABLE CHEST    HISTORY: Chest pain    Portable AP upright film of the chest was obtained at 9:30 AM.  COMPARISON: November 4, 2022    FINDINGS:   EKG leads.  Moderate-sized hiatal hernia.  The lungs are clear of an acute process.  Linear scarring upper lobes.  The heart is not enlarged.  The pulmonary vasculature is not increased.  No pleural effusion.  No pneumothorax.  No acute osseous abnormality.  Degenerative changes are present in the thoracic spine.      Impression:      CONCLUSION:  Moderate-sized hiatal hernia.  The lungs are clear of an acute process.    [id number]    Electronically signed by:  Jamir Castrejon MD  12/20/2022 10:04 AM  CST Workstation: [id number]          I have reviewed the patient's current medications.     Assessment/Plan     Hospital Problem List:  Principal Problem:    Chest pain, unspecified type    Chest pain (likely atypical): Patient remains chest pain-free and has had negative cardiac markers.  She has been seen by the cardiologist on consult, determined to have atypical chest pain and recommended stress test.  Echocardiogram is pending.  Input  by cardiologist is appreciated.     Accelerated hypertension: Blood pressure has improved.  Continue Cozaar, Cardizem CD and make adjustments as needed to achieve optimal blood pressure control.     Glaucoma: Continue outpatient eyedrops.     Chronic kidney disease stage I/II: Creatinine is 0.88 today.  Continue to monitor. Mild hyponatremia is clinically insignificant.     Incidental finding of gallbladder sludge ball/neoplasm: Patient has been seen by general surgery on consult and recommended CT scan of the abdomen and pelvis for additional work-up.  She will likely need cholecystectomy.  Input by general surgery is appreciated.       Continue PPI for GERD/ DVT prophylaxis with Lovenox.     CODE STATUS was discussed with the patient and she wishes to be a full code.       Discharge Planning: In progress.    I confirmed that the patient's Advance Care Plan is present, code status is documented, or surrogate decision maker is listed in the patient's medical record.     I have utilized all available immediate resources to obtain, update, or review the patient's current medications      Warren Beasley MD   12/21/22   09:46 CST

## 2022-12-21 NOTE — CONSULTS
"Adult Nutrition  Assessment/PES    Patient Name:  Lyndsay Brumfield  YOB: 1940  MRN: [id number]  Admit Date:  12/20/2022    Assessment Date:  12/21/2022    Comments:  RD consult r/t MST and report of decreased po. Pt admit d/t chest pain. Echo today. Incidental finding of gallbladder mass, suspicious for malignancy. Pt reports decreased po r/t early satiety and nausea probably r/t gallbladder. Per med notes, pt will likely need cholecystectomy but this will be addressed after the holidays per pt. Pt reports 16# weight loss since April but that is not reflected in epic weights recorded. Pt does not want any supplements added but says she does drink them at home sometimes. Of note, pt says she has had her esophagus stretched 3 times since April---she denies swallowing issues at this time or need for any consistency changes. RD will monitor course.       Reason for Assessment     Row Name 12/21/22 1223          Reason for Assessment    Reason For Assessment identified at risk by screening criteria     Diagnosis gastrointestinal disease     Identified At Risk by Screening Criteria MST SCORE 2+;reduced oral intake over the last month                Nutrition/Diet History     Row Name 12/21/22 1223          Nutrition/Diet History    Typical Intake (Food/Fluid/EN/PN) Pt says her intake has been low d/t early satiety and nausea. She reports ~16# wt loss since april. Also says she has had esophagus stretched 3 times since april. Denies need for consistency change. Does not want supplements \"just yet\".     Factors Affecting Nutritional Intake altered gastrointestinal function;appetite                Labs/Tests/Procedures/Meds     Row Name 12/21/22 9186          Labs/Procedures/Meds    Lab Results Reviewed reviewed        Diagnostic Tests/Procedures    Diagnostic Test/Procedure Reviewed reviewed     Diagnostic Test/Procedures Comments echo        Medications    Pertinent Medications Reviewed reviewed     " "             Estimated/Assessed Needs - Anthropometrics     Row Name 12/21/22 1227 12/21/22 0911       Anthropometrics    Height -- 162.6 cm (64.02\")    Weight -- 68.4 kg (150 lb 12.7 oz)    Weight for Calculation 54.4 kg (120 lb) --       Estimated/Assessed Needs    Additional Documentation Fluid Requirements (Group);Protein Requirements (Group);KCAL/KG (Group) --       KCAL/KG    KCAL/KG 25 Kcal/Kg (kcal) --    25 Kcal/Kg (kcal) 1360.8 --       Protein Requirements    Weight Used For Protein Calculations 54.4 kg (120 lb) --    Est Protein Requirement Amount (gms/kg) 0.8 gm protein --    Estimated Protein Requirements (gms/day) 43.55 --       Fluid Requirements    Fluid Requirements (mL/day) 1200 --    RDA Method (mL) 1200 --    Row Name 12/21/22 0500          Anthropometrics    Weight 68.4 kg (150 lb 12.8 oz)                Nutrition Prescription Ordered     Row Name 12/21/22 1227          Nutrition Prescription PO    Current PO Diet Regular     Fluid Consistency Thin     Common Modifiers Cardiac                Evaluation of Received Nutrient/Fluid Intake     Row Name 12/21/22 1227          PO Evaluation    Number of Days PO Intake Evaluated Insufficient Data  pt has been NPO                   Problem/Interventions:   Problem 1     Row Name 12/21/22 1228          Nutrition Diagnoses Problem 1    Problem 1 Altered GI Function     Etiology (related to) Medical Diagnosis     Gastrointestinal Abdominal mass  gallbladder mass     Signs/Symptoms (evidenced by) Report of Mnimal PO Intake;Unintended Weight Change     Unintended Weight Change Loss     Number of Pounds Lost 16     Weight loss time period 8 months                      Intervention Goal     Row Name 12/21/22 1229          Intervention Goal    General Reduce/improve symptoms;Meet nutritional needs for age/condition     PO Meet estimated needs     Weight Maintain weight                Nutrition Intervention     Row Name 12/21/22 1229          Nutrition " Intervention    RD/Tech Action Follow Tx progress;Care plan reviewd;Encourage intake;Supplement offered/refused;Advise alternate selection;Interview for preference                  Education/Evaluation     Row Name 12/21/22 8124          Education    Education Education topics     Education Topics Basic nutrition        Monitor/Evaluation    Monitor Per protocol;PO intake;Pertinent labs;Weight;Symptoms     Education Follow-up Reinforce PRN                 Electronically signed by:  Marivel Francisco RD  12/21/22 12:30 CST

## 2022-12-21 NOTE — PLAN OF CARE
Goal Outcome Evaluation:  Plan of Care Reviewed With: patient           Outcome Evaluation: Pt w/decreased po intake d/t early satiety and nausea. Pt says gallbladder issue will be addressed after holidays. declines supplements at this time. RD monitoring.

## 2022-12-21 NOTE — PROGRESS NOTES
"[facility] Progress Note   LOS: 0 days   Patient Care Team:  Lawson Garland MD as PCP - General (Family Medicine)  Elvira Wharton, RN as Ambulatory  ([facility])    Chief Complaint:    Chief Complaint   Patient presents with   • Chest Pain   • Hypertension        Subjective     Interval History:   Patient Denies: no complaints today  Patient Complaints: no complaints today  History taken from: patient chart     No acute events overnight. VSS. Denies chest pain or abdominal pain. Pending Nuclear stress test and echo today.     Objective     Vital Sign Min/Max for last 24 hours  Temp  Min: 96.8 °F (36 °C)  Max: 98 °F (36.7 °C)   BP  Min: 132/61  Max: 187/79   Pulse  Min: 72  Max: 109   Resp  Min: 16  Max: 18   SpO2  Min: 94 %  Max: 99 %   No data recorded   Weight  Min: 68.4 kg (150 lb 12.7 oz)  Max: 68.4 kg (150 lb 12.8 oz)     Flowsheet Rows    Flowsheet Row First Filed Value   Admission Height 162.6 cm (64\") Documented at 12/20/2022 0929   Admission Weight 68.9 kg (152 lb) Documented at 12/20/2022 0929            12/20/22  0929 12/21/22  0500 12/21/22  0911   Weight: 68.9 kg (152 lb) 68.4 kg (150 lb 12.8 oz) 68.4 kg (150 lb 12.7 oz)       Physical Exam:  Physical Exam  Vitals and nursing note reviewed.   Constitutional:       General: She is not in acute distress.     Appearance: Normal appearance. She is well-developed. She is not diaphoretic.   HENT:      Head: Normocephalic.      Right Ear: External ear normal.      Left Ear: External ear normal.   Eyes:      General: Lids are normal.      Pupils: Pupils are equal, round, and reactive to light.   Neck:      Thyroid: No thyromegaly.      Vascular: No carotid bruit or JVD.      Trachea: No tracheal deviation.   Cardiovascular:      Rate and Rhythm: Normal rate and regular rhythm.      Heart sounds: Murmur heard.    Systolic murmur is present with a grade of 1/6.    No friction rub. No gallop.   Pulmonary:      Effort: Pulmonary effort is " normal. No respiratory distress.      Breath sounds: Normal breath sounds. No stridor. No wheezing or rales.   Chest:      Chest wall: No tenderness.   Abdominal:      General: Bowel sounds are normal. There is no distension.      Palpations: Abdomen is soft.      Tenderness: There is no abdominal tenderness.   Musculoskeletal:      Right lower leg: No edema.      Left lower leg: No edema.   Skin:     General: Skin is warm and dry.      Findings: No erythema or rash.   Neurological:      Mental Status: She is alert and oriented to person, place, and time.      Cranial Nerves: No cranial nerve deficit.      Deep Tendon Reflexes: Reflexes are normal and symmetric. Reflexes normal.   Psychiatric:         Behavior: Behavior normal.         Thought Content: Thought content normal.         Judgment: Judgment normal.          Results Review:     Results from last 7 days   Lab Units 12/21/22  0546 12/20/22  1029   SODIUM mmol/L 134* 134*   POTASSIUM mmol/L 4.0 4.6   CHLORIDE mmol/L 101 100   CO2 mmol/L 22.0 25.0   BUN mg/dL 16 19   CREATININE mg/dL 0.88 1.05*   CALCIUM mg/dL 9.3 9.6   BILIRUBIN mg/dL 0.4 0.3   ALK PHOS U/L 55 59   ALT (SGPT) U/L 13 16   AST (SGOT) U/L 19 22   GLUCOSE mg/dL 121* 134*       Estimated Creatinine Clearance: 45.2 mL/min (by C-G formula based on SCr of 0.88 mg/dL).                Results from last 7 days   Lab Units 12/21/22  0546 12/20/22  0941   WBC 10*3/mm3 7.34 6.41   HEMOGLOBIN g/dL 11.8* 12.8   PLATELETS 10*3/mm3 223 237           Lab Results   Component Value Date    PROBNP 307.8 11/04/2022       No intake/output data recorded.    Cardiographics:  ECG/EMG Results (last 24 hours)     Procedure Component Value Units Date/Time    SCANNED EKG [[id number]] Resulted: 12/20/22     Updated: 12/20/22 2246    Adult Transthoracic Echo Complete w/ Color, Spectral and Contrast if necessary per protocol [[id number]] Resulted: 12/21/22 1031     Updated: 12/21/22 1034     Target HR (85%) 116 bpm      Max.  Pred. HR (100%) 136 bpm      EF(MOD-bp) 59.1 %      LVIDd 3.6 cm      LVIDs 2.44 cm      IVSd 1.08 cm      LVPWd 0.99 cm      FS 32.2 %      IVS/LVPW 1.09 cm      ESV(cubed) 14.5 ml      LV Sys Vol (BSA corrected) 13.8 cm2      EDV(cubed) 46.3 ml      LV Edwards Vol (BSA corrected) 37.5 cm2      LVOT area 2.7 cm2      LV mass(C)d 113.6 grams      LVOT diam 1.85 cm      EDV(MOD-sp2) 49.9 ml      EDV(MOD-sp4) 64.9 ml      ESV(MOD-sp2) 22.4 ml      ESV(MOD-sp4) 23.9 ml      SV(MOD-sp2) 27.5 ml      SV(MOD-sp4) 41.0 ml      SI(MOD-sp2) 15.9 ml/m2      SI(MOD-sp4) 23.7 ml/m2      EF(MOD-sp2) 55.1 %      EF(MOD-sp4) 63.2 %      MV E max hector 105.0 cm/sec      MV A max hector 95.5 cm/sec      MV E/A 1.10     LA ESV Index (BP) 28.2 ml/m2      Med Peak E' Hector 5.1 cm/sec      Lat Peak E' Hector 10.1 cm/sec      Avg E/e' ratio 13.82     SV(LVOT) 73.6 ml      RVIDd 2.6 cm      LA dimension (2D)  3.4 cm      LV V1 max 123.6 cm/sec      LV V1 max PG 6.1 mmHg      LV V1 mean PG 3.5 mmHg      LV V1 VTI 27.2 cm      Ao pk hector 173.4 cm/sec      Ao max PG 12.0 mmHg      Ao mean PG 7.1 mmHg      Ao V2 VTI 36.4 cm      HEAVEN(I,D) 2.02 cm2      MV max PG 5.7 mmHg      MV mean PG 2.35 mmHg      MV V2 VTI 27.5 cm      MV P1/2t 57.3 msec      MVA(P1/2t) 3.8 cm2      MVA(VTI) 2.7 cm2      MV dec slope 602.1 cm/sec2      MR max hector 634.3 cm/sec      MR max .9 mmHg      TR max hector 320.8 cm/sec      TR max PG 41.2 mmHg      RVSP(TR) 51.2 mmHg      RAP systole 10.0 mmHg      PA V2 max 107.1 cm/sec      Ao root diam 2.8 cm      ACS 1.73 cm      STJ 2.42 cm     Narrative:      •  Left ventricular systolic function is normal. Left ventricular ejection   fraction appears to be 66 - 70%.  •  Left ventricular diastolic function was normal.  •  Left atrial volume is mildly increased.  •  Estimated right ventricular systolic pressure from tricuspid   regurgitation is moderately elevated (45-55 mmHg).  •  Mild pulmonary hypertension is present.           Results for orders placed during the hospital encounter of 12/20/22    Adult Transthoracic Echo Complete w/ Color, Spectral and Contrast if necessary per protocol    Interpretation Summary  •  Left ventricular systolic function is normal. Left ventricular ejection fraction appears to be 66 - 70%.  •  Left ventricular diastolic function was normal.  •  Left atrial volume is mildly increased.  •  Estimated right ventricular systolic pressure from tricuspid regurgitation is moderately elevated (45-55 mmHg).  •  Mild pulmonary hypertension is present.      Imaging Results (Most Recent)     Procedure Component Value Units Date/Time    CT Abdomen Pelvis With Contrast [[id number]] Resulted: 12/20/22 1817     Updated: 12/20/22 1820    US Gallbladder [[id number]] Collected: 12/20/22 1110     Updated: 12/20/22 1334    Narrative:      Ultrasound gallbladder, right upper quadrant.    HISTORY: Epigastric pain.    FINDINGS: Normal liver. No masses or intrahepatic biliary  dilatation.    There is a 2.9 cm echogenic mass within the dependent portion of  the gallbladder. This may represent either a sludge ball or  gallbladder neoplasm.    No discrete stones are observed.    Normal common bile duct 0.36 image.    No right upper quadrant tenderness.    Normal pancreatic head and neck. Pancreatic body and tail,  obscured by overlying bowel gas.    Normal right kidney 8.3 x 4.0 x 2.7 cm.      Impression:      Mass within the gallbladder either a sludge ball or  gallbladder neoplasm.    No discrete stones are identified.    Examination is otherwise unremarkable.    Electronically signed by:  Jovon Peralta MD  12/20/2022 1:32 PM CST  Workstation: [id number]    XR Chest 1 View [[id number]] Collected: 12/20/22 0930     Updated: 12/20/22 1007    Narrative:        PORTABLE CHEST    HISTORY: Chest pain    Portable AP upright film of the chest was obtained at 9:30 AM.  COMPARISON: November 4, 2022    FINDINGS:   EKG leads.  Moderate-sized hiatal  hernia.  The lungs are clear of an acute process.  Linear scarring upper lobes.  The heart is not enlarged.  The pulmonary vasculature is not increased.  No pleural effusion.  No pneumothorax.  No acute osseous abnormality.  Degenerative changes are present in the thoracic spine.      Impression:      CONCLUSION:  Moderate-sized hiatal hernia.  The lungs are clear of an acute process.    [id number]    Electronically signed by:  Jamir Castrejon MD  12/20/2022 10:04 AM  CST Workstation: [id number]          US Gallbladder    Result Date: 12/20/2022  Mass within the gallbladder either a sludge ball or gallbladder neoplasm. No discrete stones are identified. Examination is otherwise unremarkable. Electronically signed by:  Jovon Peralta MD  12/20/2022 1:32 PM CST Workstation: [id number]    XR Chest 1 View    Result Date: 12/20/2022  CONCLUSION: Moderate-sized hiatal hernia. The lungs are clear of an acute process. [id number] Electronically signed by:  Jamir Castrejon MD  12/20/2022 10:04 AM CST Workstation: [id number]      Medication Review:     Current Facility-Administered Medications:   •  acetaminophen (TYLENOL) tablet 650 mg, 650 mg, Oral, Q4H PRN **OR** acetaminophen (TYLENOL) 160 MG/5ML solution 650 mg, 650 mg, Oral, Q4H PRN **OR** acetaminophen (TYLENOL) suppository 650 mg, 650 mg, Rectal, Q4H PRN, Warren Beasley MD  •  aspirin EC tablet 81 mg, 81 mg, Oral, Daily, Warren Beasley MD, 81 mg at 12/21/22 1155  •  atorvastatin (LIPITOR) tablet 40 mg, 40 mg, Oral, Nightly, Warren Beasley MD, 40 mg at 12/20/22 2024  •  dilTIAZem CD (CARDIZEM CD) 24 hr capsule 180 mg, 180 mg, Oral, Daily, Warren Beasley MD, 180 mg at 12/21/22 1155  •  dorzolamide-timolol (COSOPT) ophthalmic solution 1 drop, 1 drop, Right Eye, BID, Warren Beasley MD, 1 drop at 12/21/22 1156  •  Enoxaparin Sodium (LOVENOX) syringe 40 mg, 40 mg, Subcutaneous, Daily, Warren Beasley MD, 40 mg at 12/21/22 1155  •  hydrALAZINE (APRESOLINE) injection 10  mg, 10 mg, Intravenous, Q6H PRN, Warren Beasley MD, 10 mg at 12/20/22 1704  •  latanoprost (XALATAN) 0.005 % ophthalmic solution 1 drop, 1 drop, Both Eyes, Nightly, Warren Beasley MD, 1 drop at 12/20/22 2124  •  [START ON 12/22/2022] losartan (COZAAR) tablet 50 mg, 50 mg, Oral, Daily, Karime Patel APRN  •  magnesium oxide (MAG-OX) tablet 400 mg, 400 mg, Oral, Daily, Warren Beasley MD, 400 mg at 12/21/22 1156  •  metoprolol tartrate (LOPRESSOR) tablet 25 mg, 25 mg, Oral, Q12H, Karime Patel APRN, 25 mg at 12/21/22 1155  •  nitroglycerin (NITROSTAT) SL tablet 0.4 mg, 0.4 mg, Sublingual, Q5 Min PRN, Warren Beasley MD  •  ondansetron (ZOFRAN) tablet 4 mg, 4 mg, Oral, Q6H PRN **OR** ondansetron (ZOFRAN) injection 4 mg, 4 mg, Intravenous, Q6H PRN, Warren Beasley MD  •  pantoprazole (PROTONIX) EC tablet 40 mg, 40 mg, Oral, QAM, Warren Beasley MD  •  sodium chloride 0.9 % flush 10 mL, 10 mL, Intravenous, Q12H, Warren Beasley MD, 10 mL at 12/21/22 1158  •  sodium chloride 0.9 % flush 10 mL, 10 mL, Intravenous, PRN, Warren Beasley MD  •  sodium chloride 0.9 % infusion 40 mL, 40 mL, Intravenous, PRN, Warren Beasley MD  •  sodium chloride 0.9 % infusion, 75 mL/hr, Intravenous, Continuous, Warren Beasley MD, Last Rate: 75 mL/hr at 12/21/22 0852, 75 mL/hr at 12/21/22 0852    Assessment & Plan       Chest pain, unspecified type    #1. Atypical chest pain: Patient does have cardiac risk factors of hypertension, hyperlipidemia.  She previously was evaluated by Dr. Mills with treadmill stress test in 2017.  Hypertension could be contributing.  ACS has been ruled out with negative enzymes.  EKG with nonspecific T wave changes.     -NPO   -Nuclear stress test pending today.  -Echocardiogram showing EF 66 to 70%, normal diastolic function, left atrial volume is mildly increased, RVSP moderately elevated with mild PAH.  -Aspirin, beta-blocker, statin     #2. HTN: Still  "fluctuating and elevated at times.  -Diltiazem 180 mg, metoprolol tartrate 25 mg twice daily was started, increase losartan to 50mg    #3.  History of paroxysmal SVT: Currently she remains in sinus rhythm with controlled ventricular rate.  Continue diltiazem and beta-blocker.  Telemetry for monitoring     #4. Incidental finding of gallbladder sludge ball/neoplasm, primary has consulted general surgery. CTA abdomen/pelvis pending.   She may require cholecystectomy for possible gallbladder cancer      Plan for disposition: Continue 3 west.             This document has been electronically signed by ELISABETH Gill on December 21, 2022 11:58 CST     Electronically signed by ELISABETH Gill, 12/21/22, 11:58 AM CST.    I personally saw and examined Lyndsay Brumfield after the APRN.  I personally performed a history and physical examination of the patient.  I personally reviewed independent findings and plan of care.  I discussed management with the APRN.  I agree with the APRN's documentation.    Subjective: No acute issues overnight.  No further chest pains.    Vitals:    12/21/22 0728 12/21/22 0738 12/21/22 0911 12/21/22 1153   BP:  169/73  168/73   BP Location:  Left arm  Left arm   Patient Position:  Lying  Lying   Pulse: 89 82  83   Resp:  16  16   Temp:  97.5 °F (36.4 °C)     TempSrc:  Temporal     SpO2:  95%  95%   Weight:   68.4 kg (150 lb 12.7 oz)    Height:   162.6 cm (64.02\")        82-year-old female history of hypertension, SVT has been in the hospital with chest pain.  Has been ruled out for acute coronary syndrome for chest with the setting of elevated blood pressure and has mixed features of typical antibiotic character.    1.  Chest pain: Rule out for acute coronary syndrome.  Stress test was low risk.  Echocardiogram showed preserved LV systolic function.  Optimization of hypertension.    2.  Hypertension:  Losartan increased to 50 mg.  Continue Cardizem.  If continues to stay " elevated consider adding diuretic.    Thank you for the consult.  We will sign off at this point.  Please call with questions.      Electronically signed by Rico Foster MD, 12/21/22, 1:54 PM CST.

## 2022-12-21 NOTE — PLAN OF CARE
Goal Outcome Evaluation:  Plan of Care Reviewed With: patient           Outcome Evaluation: pt alert and oriented; vss; no complaints this shift; will continue to monitor

## 2022-12-22 ENCOUNTER — READMISSION MANAGEMENT (OUTPATIENT)
Dept: CALL CENTER | Facility: HOSPITAL | Age: 82
End: 2022-12-22

## 2022-12-22 VITALS
SYSTOLIC BLOOD PRESSURE: 158 MMHG | BODY MASS INDEX: 25.74 KG/M2 | OXYGEN SATURATION: 96 % | DIASTOLIC BLOOD PRESSURE: 72 MMHG | RESPIRATION RATE: 20 BRPM | HEIGHT: 64 IN | HEART RATE: 72 BPM | TEMPERATURE: 97 F | WEIGHT: 150.79 LBS

## 2022-12-22 LAB
ALBUMIN SERPL-MCNC: 3.6 G/DL (ref 3.5–5.2)
ALBUMIN/GLOB SERPL: 1.3 G/DL
ALP SERPL-CCNC: 55 U/L (ref 39–117)
ALT SERPL W P-5'-P-CCNC: 16 U/L (ref 1–33)
ANION GAP SERPL CALCULATED.3IONS-SCNC: 9 MMOL/L (ref 5–15)
AST SERPL-CCNC: 23 U/L (ref 1–32)
BASOPHILS # BLD AUTO: 0.04 10*3/MM3 (ref 0–0.2)
BASOPHILS NFR BLD AUTO: 0.7 % (ref 0–1.5)
BILIRUB SERPL-MCNC: 0.5 MG/DL (ref 0–1.2)
BUN SERPL-MCNC: 14 MG/DL (ref 8–23)
BUN/CREAT SERPL: 16.3 (ref 7–25)
CALCIUM SPEC-SCNC: 8.8 MG/DL (ref 8.6–10.5)
CHLORIDE SERPL-SCNC: 103 MMOL/L (ref 98–107)
CO2 SERPL-SCNC: 23 MMOL/L (ref 22–29)
CREAT SERPL-MCNC: 0.86 MG/DL (ref 0.57–1)
DEPRECATED RDW RBC AUTO: 45.2 FL (ref 37–54)
EGFRCR SERPLBLD CKD-EPI 2021: 66.7 ML/MIN/1.73
EOSINOPHIL # BLD AUTO: 0.15 10*3/MM3 (ref 0–0.4)
EOSINOPHIL NFR BLD AUTO: 2.7 % (ref 0.3–6.2)
ERYTHROCYTE [DISTWIDTH] IN BLOOD BY AUTOMATED COUNT: 12.5 % (ref 12.3–15.4)
GLOBULIN UR ELPH-MCNC: 2.7 GM/DL
GLUCOSE SERPL-MCNC: 105 MG/DL (ref 65–99)
HCT VFR BLD AUTO: 37.4 % (ref 34–46.6)
HGB BLD-MCNC: 12 G/DL (ref 12–15.9)
IMM GRANULOCYTES # BLD AUTO: 0.01 10*3/MM3 (ref 0–0.05)
IMM GRANULOCYTES NFR BLD AUTO: 0.2 % (ref 0–0.5)
LYMPHOCYTES # BLD AUTO: 2.13 10*3/MM3 (ref 0.7–3.1)
LYMPHOCYTES NFR BLD AUTO: 38.1 % (ref 19.6–45.3)
MCH RBC QN AUTO: 31.7 PG (ref 26.6–33)
MCHC RBC AUTO-ENTMCNC: 32.1 G/DL (ref 31.5–35.7)
MCV RBC AUTO: 98.7 FL (ref 79–97)
MONOCYTES # BLD AUTO: 0.72 10*3/MM3 (ref 0.1–0.9)
MONOCYTES NFR BLD AUTO: 12.9 % (ref 5–12)
NEUTROPHILS NFR BLD AUTO: 2.54 10*3/MM3 (ref 1.7–7)
NEUTROPHILS NFR BLD AUTO: 45.4 % (ref 42.7–76)
NRBC BLD AUTO-RTO: 0 /100 WBC (ref 0–0.2)
PLATELET # BLD AUTO: 208 10*3/MM3 (ref 140–450)
PMV BLD AUTO: 10.3 FL (ref 6–12)
POTASSIUM SERPL-SCNC: 3.7 MMOL/L (ref 3.5–5.2)
PROT SERPL-MCNC: 6.3 G/DL (ref 6–8.5)
RBC # BLD AUTO: 3.79 10*6/MM3 (ref 3.77–5.28)
SODIUM SERPL-SCNC: 135 MMOL/L (ref 136–145)
WBC NRBC COR # BLD: 5.59 10*3/MM3 (ref 3.4–10.8)

## 2022-12-22 PROCEDURE — 85025 COMPLETE CBC W/AUTO DIFF WBC: CPT | Performed by: INTERNAL MEDICINE

## 2022-12-22 PROCEDURE — 80053 COMPREHEN METABOLIC PANEL: CPT | Performed by: INTERNAL MEDICINE

## 2022-12-22 PROCEDURE — G0378 HOSPITAL OBSERVATION PER HR: HCPCS

## 2022-12-22 RX ORDER — DILTIAZEM HYDROCHLORIDE 180 MG/1
180 CAPSULE, COATED, EXTENDED RELEASE ORAL DAILY
Qty: 30 CAPSULE | Refills: 2 | Status: SHIPPED | OUTPATIENT
Start: 2022-12-22 | End: 2023-02-20

## 2022-12-22 RX ORDER — LOSARTAN POTASSIUM 50 MG/1
50 TABLET ORAL DAILY
Qty: 30 TABLET | Refills: 2 | Status: SHIPPED | OUTPATIENT
Start: 2022-12-22

## 2022-12-22 RX ADMIN — ASPIRIN 81 MG: 81 TABLET, FILM COATED ORAL at 08:57

## 2022-12-22 RX ADMIN — METOPROLOL TARTRATE 25 MG: 25 TABLET, FILM COATED ORAL at 08:57

## 2022-12-22 RX ADMIN — LOSARTAN POTASSIUM 50 MG: 50 TABLET, FILM COATED ORAL at 08:57

## 2022-12-22 RX ADMIN — DORZOLAMIDE HYDROCHLORIDE AND TIMOLOL MALEATE 1 DROP: 20; 5 SOLUTION/ DROPS OPHTHALMIC at 08:58

## 2022-12-22 RX ADMIN — DILTIAZEM HYDROCHLORIDE 180 MG: 180 CAPSULE, COATED, EXTENDED RELEASE ORAL at 08:56

## 2022-12-22 RX ADMIN — Medication 400 MG: at 08:57

## 2022-12-22 NOTE — DISCHARGE SUMMARY
[facility]  DISCHARGE SUMMARY       Date of Admission: 12/20/2022  Date of Discharge:  12/22/2022  Primary Care Physician: Lawson Garland MD    Presenting Problem/History of Present Illness:  Abnormal gallbladder ultrasound [R93.2]  Chest pain, unspecified type [R07.9]  Hypertension, unspecified type [I10]       Final Discharge Diagnoses:  Active Hospital Problems    Diagnosis    • **Chest pain, unspecified type        Consults:   Consults     Date and Time Order Name Status Description    12/20/2022  2:01 PM Inpatient General Surgery Consult Completed     12/20/2022  2:01 PM Inpatient Cardiology Consult Completed           Procedures Performed: None.                Pertinent Test Results:   Lab Results (last 7 days)     Procedure Component Value Units Date/Time    CBC & Differential [[id number]]  (Abnormal) Collected: 12/22/22 0604    Specimen: Blood Updated: 12/22/22 0709    Narrative:      The following orders were created for panel order CBC & Differential.  Procedure                               Abnormality         Status                     ---------                               -----------         ------                     CBC Auto Differential[[id number]]        Abnormal            Final result                 Please view results for these tests on the individual orders.    CBC Auto Differential [[id number]]  (Abnormal) Collected: 12/22/22 0604    Specimen: Blood Updated: 12/22/22 0709     WBC 5.59 10*3/mm3      RBC 3.79 10*6/mm3      Hemoglobin 12.0 g/dL      Hematocrit 37.4 %      MCV 98.7 fL      MCH 31.7 pg      MCHC 32.1 g/dL      RDW 12.5 %      RDW-SD 45.2 fl      MPV 10.3 fL      Platelets 208 10*3/mm3      Neutrophil % 45.4 %      Lymphocyte % 38.1 %      Monocyte % 12.9 %      Eosinophil % 2.7 %      Basophil % 0.7 %      Immature Grans % 0.2 %      Neutrophils, Absolute 2.54 10*3/mm3      Lymphocytes, Absolute 2.13 10*3/mm3       Monocytes, Absolute 0.72 10*3/mm3      Eosinophils, Absolute 0.15 10*3/mm3      Basophils, Absolute 0.04 10*3/mm3      Immature Grans, Absolute 0.01 10*3/mm3      nRBC 0.0 /100 WBC     Comprehensive Metabolic Panel [[id number]]  (Abnormal) Collected: 12/22/22 0604    Specimen: Blood Updated: 12/22/22 0659     Glucose 105 mg/dL      BUN 14 mg/dL      Creatinine 0.86 mg/dL      Sodium 135 mmol/L      Potassium 3.7 mmol/L      Chloride 103 mmol/L      CO2 23.0 mmol/L      Calcium 8.8 mg/dL      Total Protein 6.3 g/dL      Albumin 3.60 g/dL      ALT (SGPT) 16 U/L      AST (SGOT) 23 U/L      Alkaline Phosphatase 55 U/L      Total Bilirubin 0.5 mg/dL      Globulin 2.7 gm/dL      A/G Ratio 1.3 g/dL      BUN/Creatinine Ratio 16.3     Anion Gap 9.0 mmol/L      eGFR 66.7 mL/min/1.73      Comment: National Kidney Foundation and American Society of Nephrology (ASN) Task Force recommended calculation based on the Chronic Kidney Disease Epidemiology Collaboration (CKD-EPI) equation refit without adjustment for race.       Narrative:      GFR Normal >60  Chronic Kidney Disease <60  Kidney Failure <15    The GFR formula is only valid for adults with stable renal function between ages 18 and 70.    Comprehensive Metabolic Panel [[id number]]  (Abnormal) Collected: 12/21/22 0546    Specimen: Blood Updated: 12/21/22 0648     Glucose 121 mg/dL      BUN 16 mg/dL      Creatinine 0.88 mg/dL      Sodium 134 mmol/L      Potassium 4.0 mmol/L      Chloride 101 mmol/L      CO2 22.0 mmol/L      Calcium 9.3 mg/dL      Total Protein 6.6 g/dL      Albumin 4.00 g/dL      ALT (SGPT) 13 U/L      AST (SGOT) 19 U/L      Alkaline Phosphatase 55 U/L      Total Bilirubin 0.4 mg/dL      Globulin 2.6 gm/dL      A/G Ratio 1.5 g/dL      BUN/Creatinine Ratio 18.2     Anion Gap 11.0 mmol/L      eGFR 64.9 mL/min/1.73      Comment: National Kidney Foundation and American Society of Nephrology (ASN) Task Force recommended calculation based on the Chronic Kidney  Disease Epidemiology Collaboration (CKD-EPI) equation refit without adjustment for race.       Narrative:      GFR Normal >60  Chronic Kidney Disease <60  Kidney Failure <15    The GFR formula is only valid for adults with stable renal function between ages 18 and 70.    CBC & Differential [[id number]]  (Abnormal) Collected: 12/21/22 0546    Specimen: Blood Updated: 12/21/22 0616    Narrative:      The following orders were created for panel order CBC & Differential.  Procedure                               Abnormality         Status                     ---------                               -----------         ------                     CBC Auto Differential[[id number]]        Abnormal            Final result                 Please view results for these tests on the individual orders.    CBC Auto Differential [[id number]]  (Abnormal) Collected: 12/21/22 0546    Specimen: Blood Updated: 12/21/22 0616     WBC 7.34 10*3/mm3      RBC 3.69 10*6/mm3      Hemoglobin 11.8 g/dL      Hematocrit 35.2 %      MCV 95.4 fL      MCH 32.0 pg      MCHC 33.5 g/dL      RDW 12.1 %      RDW-SD 42.5 fl      MPV 10.5 fL      Platelets 223 10*3/mm3      Neutrophil % 65.5 %      Lymphocyte % 23.7 %      Monocyte % 9.4 %      Eosinophil % 0.8 %      Basophil % 0.5 %      Immature Grans % 0.1 %      Neutrophils, Absolute 4.80 10*3/mm3      Lymphocytes, Absolute 1.74 10*3/mm3      Monocytes, Absolute 0.69 10*3/mm3      Eosinophils, Absolute 0.06 10*3/mm3      Basophils, Absolute 0.04 10*3/mm3      Immature Grans, Absolute 0.01 10*3/mm3      nRBC 0.0 /100 WBC     Troponin [[id number]]  (Normal) Collected: 12/20/22 1424    Specimen: Blood Updated: 12/20/22 1449     Troponin T <0.010 ng/mL     Narrative:      Troponin T Reference Range:  <= 0.03 ng/mL-   Negative for AMI  >0.03 ng/mL-     Abnormal for myocardial necrosis.  Clinicians would have to utilize clinical acumen, EKG, Troponin and serial changes to determine if it is an Acute Myocardial  Infarction or myocardial injury due to an underlying chronic condition.       Results may be falsely decreased if patient taking Biotin.      Troponin [[id number]]  (Normal) Collected: 12/20/22 1208    Specimen: Blood Updated: 12/20/22 1227     Troponin T <0.010 ng/mL     Narrative:      Troponin T Reference Range:  <= 0.03 ng/mL-   Negative for AMI  >0.03 ng/mL-     Abnormal for myocardial necrosis.  Clinicians would have to utilize clinical acumen, EKG, Troponin and serial changes to determine if it is an Acute Myocardial Infarction or myocardial injury due to an underlying chronic condition.       Results may be falsely decreased if patient taking Biotin.      Extra Tubes [[id number]] Collected: 12/20/22 0952    Specimen: Blood, Venous Line Updated: 12/20/22 1131    Narrative:      The following orders were created for panel order Extra Tubes.  Procedure                               Abnormality         Status                     ---------                               -----------         ------                     Gold Top - SST[[id number]]                                   Final result               Light Blue Top[[id number]]                                   Final result                 Please view results for these tests on the individual orders.    Gold Top - SST [[id number]] Collected: 12/20/22 1029    Specimen: Blood Updated: 12/20/22 1131     Extra Tube Hold for add-ons.     Comment: Auto resulted.       Light Blue Top [[id number]] Collected: 12/20/22 0952    Specimen: Blood Updated: 12/20/22 1101     Extra Tube Hold for add-ons.     Comment: Auto resulted       Comprehensive Metabolic Panel [[id number]]  (Abnormal) Collected: 12/20/22 1029    Specimen: Blood Updated: 12/20/22 1100     Glucose 134 mg/dL      BUN 19 mg/dL      Creatinine 1.05 mg/dL      Sodium 134 mmol/L      Potassium 4.6 mmol/L      Chloride 100 mmol/L      CO2 25.0 mmol/L      Calcium 9.6 mg/dL      Total Protein 7.3 g/dL      Albumin 4.20  g/dL      ALT (SGPT) 16 U/L      AST (SGOT) 22 U/L      Alkaline Phosphatase 59 U/L      Total Bilirubin 0.3 mg/dL      Globulin 3.1 gm/dL      A/G Ratio 1.4 g/dL      BUN/Creatinine Ratio 18.1     Anion Gap 9.0 mmol/L      eGFR 52.5 mL/min/1.73      Comment: National Kidney Foundation and American Society of Nephrology (ASN) Task Force recommended calculation based on the Chronic Kidney Disease Epidemiology Collaboration (CKD-EPI) equation refit without adjustment for race.       Narrative:      GFR Normal >60  Chronic Kidney Disease <60  Kidney Failure <15    The GFR formula is only valid for adults with stable renal function between ages 18 and 70.    Lipase [[id number]]  (Normal) Collected: 12/20/22 1029    Specimen: Blood Updated: 12/20/22 1100     Lipase 22 U/L     Troponin [[id number]]  (Normal) Collected: 12/20/22 1029    Specimen: Blood Updated: 12/20/22 1100     Troponin T <0.010 ng/mL     Narrative:      Troponin T Reference Range:  <= 0.03 ng/mL-   Negative for AMI  >0.03 ng/mL-     Abnormal for myocardial necrosis.  Clinicians would have to utilize clinical acumen, EKG, Troponin and serial changes to determine if it is an Acute Myocardial Infarction or myocardial injury due to an underlying chronic condition.       Results may be falsely decreased if patient taking Biotin.      CBC & Differential [[id number]]  (Normal) Collected: 12/20/22 0941    Specimen: Blood Updated: 12/20/22 0956    Narrative:      The following orders were created for panel order CBC & Differential.  Procedure                               Abnormality         Status                     ---------                               -----------         ------                     CBC Auto Differential[[id number]]        Normal              Final result                 Please view results for these tests on the individual orders.    CBC Auto Differential [[id number]]  (Normal) Collected: 12/20/22 0941    Specimen: Blood Updated: 12/20/22 0956      WBC 6.41 10*3/mm3      RBC 3.90 10*6/mm3      Hemoglobin 12.8 g/dL      Hematocrit 37.8 %      MCV 96.9 fL      MCH 32.8 pg      MCHC 33.9 g/dL      RDW 12.6 %      RDW-SD 44.5 fl      MPV 10.3 fL      Platelets 237 10*3/mm3      Neutrophil % 63.6 %      Lymphocyte % 22.2 %      Monocyte % 10.3 %      Eosinophil % 2.2 %      Basophil % 1.4 %      Immature Grans % 0.3 %      Neutrophils, Absolute 4.08 10*3/mm3      Lymphocytes, Absolute 1.42 10*3/mm3      Monocytes, Absolute 0.66 10*3/mm3      Eosinophils, Absolute 0.14 10*3/mm3      Basophils, Absolute 0.09 10*3/mm3      Immature Grans, Absolute 0.02 10*3/mm3      nRBC 0.0 /100 WBC         Imaging Results (Last 7 Days)     Procedure Component Value Units Date/Time    CT Abdomen Pelvis With Contrast [[id number]] Collected: 12/20/22 1816     Updated: 12/21/22 1211    Narrative:      CT abdomen, pelvis with contrast.       CLINICAL INDICATION: Pain. Possible biliary obstruction.       COMPARISON: Ultrasound gallbladder December 20, 2022       TECHNIQUE: 85 cc Isovue-300  nonionic IV contrast. Helical  scanning with axial and coronal reformations. Soft tissue, lung,  and bone windows reviewed.    This exam was performed according to our departmental  dose-optimization program, which includes automated exposure  control, adjustment of the mA and/or kV according to patient size  and/or use of iterative reconstruction technique.    ABDOMEN CT FINDINGS: Large hiatus hernia. Abnormal gallbladder  demonstrating gallbladder wall thickening. Questionable mass and  calcifications in the dependent portion of the gallbladder  therefore indicating either stones or cholesterol polyps. Liver,   spleen, pancreas, adrenal glands and kidneys are normal in  appearance. Endovascular stent proximal left renal artery. Mobile  cecum,, (normal variant, on a  mesentery). Increased stool in the  colon. Bowel otherwise unremarkable.    No evidence of pathologically enlarged nodes, free air, or  free  fluid. Mild degenerative changes of the spine.  The bones are otherwise unremarkable.    PELVIS CT FINDINGS: There are no pelvic masses.    No evidence of  pathologically enlarged nodes, free air, or free fluid.    The bones are grossly unremarkable.      Impression:      Large hiatus hernia. Abnormal gallbladder  demonstrating gallbladder wall thickening, ill-defined  questionable mass in the dependent portion of the gallbladder  with calcifications either stones or cholesterol polyps. The  common bile duct does not appear to be dilated.    Normal liver. No intrahepatic biliary dilatation.     Electronically signed by:  Jovon Peralta MD  12/21/2022 12:08 PM  CST Workstation: [id number]    US Gallbladder [[id number]] Collected: 12/20/22 1110     Updated: 12/20/22 1334    Narrative:      Ultrasound gallbladder, right upper quadrant.    HISTORY: Epigastric pain.    FINDINGS: Normal liver. No masses or intrahepatic biliary  dilatation.    There is a 2.9 cm echogenic mass within the dependent portion of  the gallbladder. This may represent either a sludge ball or  gallbladder neoplasm.    No discrete stones are observed.    Normal common bile duct 0.36 image.    No right upper quadrant tenderness.    Normal pancreatic head and neck. Pancreatic body and tail,  obscured by overlying bowel gas.    Normal right kidney 8.3 x 4.0 x 2.7 cm.      Impression:      Mass within the gallbladder either a sludge ball or  gallbladder neoplasm.    No discrete stones are identified.    Examination is otherwise unremarkable.    Electronically signed by:  Jovon Peralta MD  12/20/2022 1:32 PM CST  Workstation: [id number]    XR Chest 1 View [[id number]] Collected: 12/20/22 0930     Updated: 12/20/22 1007    Narrative:        PORTABLE CHEST    HISTORY: Chest pain    Portable AP upright film of the chest was obtained at 9:30 AM.  COMPARISON: November 4, 2022    FINDINGS:   EKG leads.  Moderate-sized hiatal hernia.  The lungs are clear of an acute  process.  Linear scarring upper lobes.  The heart is not enlarged.  The pulmonary vasculature is not increased.  No pleural effusion.  No pneumothorax.  No acute osseous abnormality.  Degenerative changes are present in the thoracic spine.      Impression:      CONCLUSION:  Moderate-sized hiatal hernia.  The lungs are clear of an acute process.    [id number]    Electronically signed by:  Jamir Castrejon MD  12/20/2022 10:04 AM  CST Workstation: [id number]            Chief Complaint on Day of Discharge: None.    Hospital Course:  The patient was admitted and managed as follows:    Chest pain (likely atypical): Patient was admitted to rule out MI.  She remained chest pain-free and had negative cardiac markers.  She was seen by the cardiologist on consult, determined to have atypical chest pain and stress test which was normal.  She was cleared for discharge by the cardiologist.  Echocardiogram showed:  Left ventricular systolic function is normal. Left ventricular ejection fraction appears to be 66 - 70%.  •  Left ventricular diastolic function was normal.  •  Left atrial volume is mildly increased.  •  Estimated right ventricular systolic pressure from tricuspid regurgitation is moderately elevated (45-55 mmHg).  •  Mild pulmonary hypertension is present     Accelerated hypertension: Blood pressure has improved and remained stable with medication adjustments prior to discharge.     Incidental finding of gallbladder sludge ball/neoplasm: Patient has been seen by general surgery on consult and recommended CT scan of the abdomen and pelvis which showed large hiatal hernia, gallbladder wall thickening and ill-defined questionable mass in the gallbladder suspicious for either stones or cholesterol polyps.  Patient was cleared for discharge by general surgery and will be seen as outpatient and scheduled for elective cholecystectomy.      Condition on Discharge: Stable and improved.    Physical Exam on Discharge:  /72 (BP  "Location: Left arm, Patient Position: Sitting) Comment: rn notified  Pulse 72   Temp 97 °F (36.1 °C) (Temporal)   Resp 20   Ht 162.6 cm (64.02\")   Wt 68.4 kg (150 lb 12.7 oz)   LMP  (LMP Unknown) Comment: Postmenopausal  SpO2 96%   BMI 25.87 kg/m²   Physical Exam  Vitals and nursing note reviewed.   Constitutional:       General: She is not in acute distress.     Appearance: She is well-developed. She is obese. She is not diaphoretic.   HENT:      Head: Normocephalic and atraumatic.      Right Ear: External ear normal.      Left Ear: External ear normal.      Nose: Nose normal.   Eyes:      General: No scleral icterus.        Right eye: No discharge.         Left eye: No discharge.      Extraocular Movements: Extraocular movements intact.      Conjunctiva/sclera: Conjunctivae normal.      Pupils: Pupils are equal, round, and reactive to light.   Neck:      Thyroid: No thyromegaly.      Vascular: No JVD.   Cardiovascular:      Rate and Rhythm: Normal rate and regular rhythm.      Heart sounds: Normal heart sounds. No murmur heard.    No friction rub. No gallop.   Pulmonary:      Effort: Pulmonary effort is normal. No respiratory distress.      Breath sounds: Normal breath sounds. No stridor. No wheezing or rales.   Chest:      Chest wall: No tenderness.   Abdominal:      General: Bowel sounds are normal. There is no distension.      Palpations: Abdomen is soft. There is no mass.      Tenderness: There is no abdominal tenderness. There is no right CVA tenderness, left CVA tenderness, guarding or rebound.      Hernia: No hernia is present.   Musculoskeletal:         General: No swelling, tenderness, deformity or signs of injury. Normal range of motion.      Cervical back: Normal range of motion and neck supple.      Right lower leg: No edema.      Left lower leg: No edema.   Skin:     General: Skin is warm and dry.      Coloration: Skin is not jaundiced or pale.      Findings: No bruising, erythema, lesion or " rash.   Neurological:      General: No focal deficit present.      Mental Status: She is alert and oriented to person, place, and time.      Cranial Nerves: No cranial nerve deficit.      Sensory: No sensory deficit.      Motor: No weakness.      Coordination: Coordination normal.      Gait: Gait normal.      Deep Tendon Reflexes: Reflexes are normal and symmetric. Reflexes normal.   Psychiatric:         Mood and Affect: Mood normal.         Behavior: Behavior normal. Behavior is cooperative.         Thought Content: Thought content normal.         Judgment: Judgment normal.           Discharge Disposition:  Home or Self Care    Discharge Medications:     Discharge Medications      New Medications      Instructions Start Date   metoprolol tartrate 25 MG tablet  Commonly known as: LOPRESSOR   25 mg, Oral, Every 12 Hours Scheduled         Changes to Medications      Instructions Start Date   dilTIAZem  MG 24 hr capsule  Commonly known as: CARDIZEM CD  What changed:   · medication strength  · how much to take   180 mg, Oral, Daily      losartan 50 MG tablet  Commonly known as: COZAAR  What changed:   · medication strength  · how much to take   50 mg, Oral, Daily         Continue These Medications      Instructions Start Date   atorvastatin 40 MG tablet  Commonly known as: LIPITOR   40 mg, Oral, Nightly      CALCIUM 500 PO   1 tablet, Oral, 2 Times Daily      dorzolamide-timolol 22.3-6.8 MG/ML ophthalmic solution  Commonly known as: COSOPT   1 drop, Right Eye, 2 Times Daily      HM Aspirin EC Low Dose 81 MG EC tablet  Generic drug: aspirin   TAKE 1 TABLET EVERY DAY      latanoprost 0.005 % ophthalmic solution  Commonly known as: XALATAN   1 drop, Both Eyes, Nightly      magnesium oxide 400 MG tablet  Commonly known as: MAG-OX   400 mg, Oral, Daily      metaxalone 800 MG tablet  Commonly known as: Skelaxin   400 mg, Oral, 3 Times Daily      multivitamin with minerals tablet tablet   1 tablet, Oral, Daily       omeprazole 20 MG capsule  Commonly known as: priLOSEC   20 mg, Oral, Daily      polycarbophil 625 MG tablet tablet   625 mg, Oral, Daily      Rhopressa 0.02 % solution  Generic drug: Netarsudil Dimesylate   1 drop, Right Eye, Nightly             Discharge Diet: Heart healthy.     Activity at Discharge: As tolerated.    Discharge Care Plan/Instructions: Patient has been advised to take her medications as prescribed and to return to the emergency room in the event of any worsening symptoms.    Follow-up Appointments:   Future Appointments   Date Time Provider Department Center   1/30/2023  2:30 PM Lily Cyr APRN MG CD MAD None   2/10/2023  2:00 PM [facility] ECHO ROOM 2 [facility] CARD [place]   2/28/2023 11:00 AM Lawson Garland MD [facility] MAD5 [facility]       Test Results Pending at Discharge:       Warren Beasley MD  12/22/22  08:15 CST    Time: 35 minutes.

## 2022-12-22 NOTE — OUTREACH NOTE
Prep Survey    Flowsheet Row Responses   [facility] facility patient discharged from? [place]   Is LACE score < 7 ? Yes   Eligibility [facility]   Date of Admission 12/20/22   Date of Discharge 12/22/22   Discharge Disposition Home or Self Care   Discharge diagnosis Chest pain, unspecified type   Does the patient have one of the following disease processes/diagnoses(primary or secondary)? Other   Does the patient have Home health ordered? No   Is there a DME ordered? No   Prep survey completed? Yes          ROHINI CARL - Registered Nurse

## 2022-12-22 NOTE — PROGRESS NOTES
GENERAL SURGERY PROGRESS NOTE     LOS: 0 days     Chief Complaint:     Chest pain    Interval History:     Denies chest pain and shortness of breath. No nausea or abdominal pain. Tolerating diet.     Medication Review:   aspirin, 81 mg, Oral, Daily  atorvastatin, 40 mg, Oral, Nightly  dilTIAZem CD, 180 mg, Oral, Daily  dorzolamide-timolol, 1 drop, Right Eye, BID  enoxaparin, 40 mg, Subcutaneous, Daily  latanoprost, 1 drop, Both Eyes, Nightly  losartan, 50 mg, Oral, Daily  magnesium oxide, 400 mg, Oral, Daily  metoprolol tartrate, 25 mg, Oral, Q12H  pantoprazole, 40 mg, Oral, QAM  sodium chloride, 10 mL, Intravenous, Q12H         Objective     Vital Signs:  Temp:  [96.6 °F (35.9 °C)-98 °F (36.7 °C)] 97 °F (36.1 °C)  Heart Rate:  [67-83] 72  Resp:  [16-20] 20  BP: (137-174)/(63-80) 158/72    Intake/Output Summary (Last 24 hours) at 12/22/2022 0857  Last data filed at 12/22/2022 0452  Gross per 24 hour   Intake 2240 ml   Output --   Net 2240 ml       Physical Exam  General: not sick, in no distress  CV: normal rate, regular rhythm  Respiratory: nonlabored breathing on room air  Abdomen: soft, mild tenderness to deep palpation in RUQ, no guarding  Extremities: warm and well perfused  Results Review:    Results from last 7 days   Lab Units 12/22/22  0604 12/21/22  0546 12/20/22  1029   SODIUM mmol/L 135* 134* 134*   POTASSIUM mmol/L 3.7 4.0 4.6   CHLORIDE mmol/L 103 101 100   CO2 mmol/L 23.0 22.0 25.0   BUN mg/dL 14 16 19   CREATININE mg/dL 0.86 0.88 1.05*   GLUCOSE mg/dL 105* 121* 134*   CALCIUM mg/dL 8.8 9.3 9.6     Results from last 7 days   Lab Units 12/22/22  0604 12/21/22  0546 12/20/22  0941   WBC 10*3/mm3 5.59 7.34 6.41   HEMOGLOBIN g/dL 12.0 11.8* 12.8   HEMATOCRIT % 37.4 35.2 37.8   PLATELETS 10*3/mm3 208 223 237       Assessment:    Chest pain, unspecified type      82 yr/o lady presenting with chest pain and intermittent biliary symptoms found to have possible gallbladder mass, hiatal hernia    Plan:    -  Cardiac workup unremarkable  - Diet as tolerated  - She is not a good candidate for hiatal hernia repair  - Will discuss timing of cholecystectomy with Dr. Espino      This document has been electronically signed by Brent Rosales MD on December 22, 2022 08:57 CST

## 2022-12-23 ENCOUNTER — TRANSITIONAL CARE MANAGEMENT TELEPHONE ENCOUNTER (OUTPATIENT)
Dept: CALL CENTER | Facility: HOSPITAL | Age: 82
End: 2022-12-23

## 2022-12-23 NOTE — OUTREACH NOTE
Call Center TCM Note    Flowsheet Row Responses   [facility] patient discharged from? [place]   Does the patient have one of the following disease processes/diagnoses(primary or secondary)? Other   TCM attempt successful? No   Unsuccessful attempts Attempt 1          Cary Silva LPN    12/23/2022, 10:46 CST

## 2022-12-23 NOTE — OUTREACH NOTE
Call Center TCM Note    Flowsheet Row Responses   [facility] patient discharged from? [place]   Does the patient have one of the following disease processes/diagnoses(primary or secondary)? Other   TCM attempt successful? No   Unsuccessful attempts Attempt 2          Cary Silva LPN    12/23/2022, 14:03 CST

## 2022-12-26 ENCOUNTER — TRANSITIONAL CARE MANAGEMENT TELEPHONE ENCOUNTER (OUTPATIENT)
Dept: CALL CENTER | Facility: HOSPITAL | Age: 82
End: 2022-12-26

## 2022-12-30 ENCOUNTER — PRE-ADMISSION TESTING (OUTPATIENT)
Dept: PREADMISSION TESTING | Facility: HOSPITAL | Age: 82
End: 2022-12-30
Payer: MEDICARE

## 2022-12-30 ENCOUNTER — OFFICE VISIT (OUTPATIENT)
Dept: SURGERY | Facility: CLINIC | Age: 82
End: 2022-12-30
Payer: MEDICARE

## 2022-12-30 VITALS
BODY MASS INDEX: 26.29 KG/M2 | HEIGHT: 64 IN | DIASTOLIC BLOOD PRESSURE: 68 MMHG | HEART RATE: 69 BPM | OXYGEN SATURATION: 98 % | TEMPERATURE: 96.7 F | SYSTOLIC BLOOD PRESSURE: 134 MMHG | WEIGHT: 154 LBS

## 2022-12-30 VITALS
HEIGHT: 63 IN | RESPIRATION RATE: 14 BRPM | OXYGEN SATURATION: 98 % | DIASTOLIC BLOOD PRESSURE: 84 MMHG | HEART RATE: 63 BPM | WEIGHT: 154 LBS | SYSTOLIC BLOOD PRESSURE: 152 MMHG | BODY MASS INDEX: 27.29 KG/M2

## 2022-12-30 DIAGNOSIS — K82.8 GALLBLADDER MASS: Primary | ICD-10-CM

## 2022-12-30 PROCEDURE — 1160F RVW MEDS BY RX/DR IN RCRD: CPT | Performed by: SURGERY

## 2022-12-30 PROCEDURE — 1159F MED LIST DOCD IN RCRD: CPT | Performed by: SURGERY

## 2022-12-30 PROCEDURE — 3075F SYST BP GE 130 - 139MM HG: CPT | Performed by: SURGERY

## 2022-12-30 PROCEDURE — 3078F DIAST BP <80 MM HG: CPT | Performed by: SURGERY

## 2022-12-30 PROCEDURE — 99213 OFFICE O/P EST LOW 20 MIN: CPT | Performed by: SURGERY

## 2022-12-30 RX ORDER — BUPIVACAINE HCL/0.9 % NACL/PF 0.1 %
2 PLASTIC BAG, INJECTION (ML) EPIDURAL ONCE
Status: CANCELLED | OUTPATIENT
Start: 2023-01-04 | End: 2022-12-30

## 2022-12-30 RX ORDER — SODIUM CHLORIDE 0.9 % (FLUSH) 0.9 %
10 SYRINGE (ML) INJECTION AS NEEDED
Status: CANCELLED | OUTPATIENT
Start: 2023-01-04

## 2022-12-30 RX ORDER — SODIUM CHLORIDE 9 MG/ML
40 INJECTION, SOLUTION INTRAVENOUS AS NEEDED
Status: CANCELLED | OUTPATIENT
Start: 2023-01-04

## 2022-12-30 RX ORDER — SODIUM CHLORIDE 0.9 % (FLUSH) 0.9 %
10 SYRINGE (ML) INJECTION EVERY 12 HOURS SCHEDULED
Status: CANCELLED | OUTPATIENT
Start: 2023-01-04

## 2022-12-30 RX ORDER — SODIUM CHLORIDE, SODIUM LACTATE, POTASSIUM CHLORIDE, CALCIUM CHLORIDE 600; 310; 30; 20 MG/100ML; MG/100ML; MG/100ML; MG/100ML
100 INJECTION, SOLUTION INTRAVENOUS CONTINUOUS
Status: CANCELLED | OUTPATIENT
Start: 2023-01-04

## 2022-12-30 NOTE — DISCHARGE INSTRUCTIONS
[facility]  Pre-op Information and Guidelines    You will be called after 2 p.m. the day before your surgery (Friday for Monday surgery) and notified of your time for arrival and approximate surgery time.  If you have not received a call by 4P.M., please contact Same Day Surgery at [phone] of if outside [facility] call [phone].    Please Follow these Important Safety Guidelines:    The morning of your procedure, take only the medications listed below with   A sip of water:_____________________________________________       ______________________________________________    DO NOT eat or drink anything after 12:00 midnight the night before surgery  Specific instructions concerning drinking clear liquids will be discussed during  the pre-surgery instruction call the day before your surgery.    If you take a blood thinner (ex. Plavix, Coumadin, aspirin), ask your doctor when to stop it before surgery  STOP DATE: _________________    Only 1 visitor is allowed in patient rooms at a time  Your visitors will be asked to wait in the lobby until the admission process is complete with the exception of a parent with a child and patients in need of special assistance.    YOU CANNOT DRIVE YOURSELF HOME  You must be accompanied by someone who will be responsible for driving you home after surgery and for your care at home.    DO NOT chew gum, use breath mints, hard candy, or smoke the day of surgery  DO NOT drink alcohol for at least 24 hours before your surgery  DO NOT wear any jewelry and remove all body piercing before coming to the hospital  DO NOT wear make-up to the hospital  If you are having surgery on an extremity (arm/leg/foot) remove nail polish/artificial nails on the surgical side  Clothing, glasses, contacts, dentures, and hairpieces must be removed before surgery  Bathe the night before or the morning of your surgery and do not use powders/lotions on skin.

## 2022-12-30 NOTE — PAT
Dr. Harvey  Here to review EKG and pt history, no further orders or instructions may proceed with surgery.   Opioid pain medication pamphlet given.  Chlorhexidine given with written and verbal instructions, patient relates that she understands information given and has no questions.

## 2022-12-31 LAB
QT INTERVAL: 362 MS
QTC INTERVAL: 425 MS

## 2023-01-01 NOTE — H&P (VIEW-ONLY)
Chief Complaint   Patient presents with   • Follow-up     1 week hospital follow up        HPI  82 year old admitted to the hospital with SOB last week. She is improved after medication adjustment. Peripheral imaging demonstrated:    Study Result    Narrative & Impression   Ultrasound gallbladder, right upper quadrant.     HISTORY: Epigastric pain.     FINDINGS: Normal liver. No masses or intrahepatic biliary  dilatation.     There is a 2.9 cm echogenic mass within the dependent portion of  the gallbladder. This may represent either a sludge ball or  gallbladder neoplasm.     No discrete stones are observed.     Normal common bile duct 0.36 image.     No right upper quadrant tenderness.     Normal pancreatic head and neck. Pancreatic body and tail,  obscured by overlying bowel gas.     Normal right kidney 8.3 x 4.0 x 2.7 cm.     IMPRESSION:  Mass within the gallbladder either a sludge ball or  gallbladder neoplasm.     No discrete stones are identified.     Examination is otherwise unremarkable.     Electronically signed by:  Jovon Peralta MD  12/20/2022 1:32 PM CST  Workstation: [id number]     Study Result    Narrative & Impression   CT abdomen, pelvis with contrast.         CLINICAL INDICATION: Pain. Possible biliary obstruction.        COMPARISON: Ultrasound gallbladder December 20, 2022        TECHNIQUE: 85 cc Isovue-300  nonionic IV contrast. Helical  scanning with axial and coronal reformations. Soft tissue, lung,  and bone windows reviewed.     This exam was performed according to our departmental  dose-optimization program, which includes automated exposure  control, adjustment of the mA and/or kV according to patient size  and/or use of iterative reconstruction technique.     ABDOMEN CT FINDINGS: Large hiatus hernia. Abnormal gallbladder  demonstrating gallbladder wall thickening. Questionable mass and  calcifications in the dependent portion of the gallbladder  therefore indicating either stones or cholesterol  polyps. Liver,   spleen, pancreas, adrenal glands and kidneys are normal in  appearance. Endovascular stent proximal left renal artery. Mobile  cecum,, (normal variant, on a  mesentery). Increased stool in the  colon. Bowel otherwise unremarkable.     No evidence of pathologically enlarged nodes, free air, or free  fluid. Mild degenerative changes of the spine.  The bones are otherwise unremarkable.     PELVIS CT FINDINGS: There are no pelvic masses.    No evidence of  pathologically enlarged nodes, free air, or free fluid.    The bones are grossly unremarkable.     IMPRESSION:  Large hiatus hernia. Abnormal gallbladder  demonstrating gallbladder wall thickening, ill-defined  questionable mass in the dependent portion of the gallbladder  with calcifications either stones or cholesterol polyps. The  common bile duct does not appear to be dilated.     Normal liver. No intrahepatic biliary dilatation.      Electronically signed by:  Jovon Peralta MD  12/21/2022 12:08 PM  CST Workstation: [id number]     I have personally reviewed the imaging and concur with the radiologist's findings.    Past Medical History:   Diagnosis Date   • Allergic rhinitis    • Artificial lens present     Artificial lens in position - left      • Benign hypertension    • Breast cancer (HCC)    • CKD (chronic kidney disease)    • Corns and callus    • Dry cough     most likely due to ACE inhibitor     • Elevated cholesterol    • Essential hypertension    • Gallbladder mass    • GERD (gastroesophageal reflux disease)    • Glaucoma, both eyes    • Hammer toe    • Heart murmur    • Hiatal hernia    • History of Papanicolaou smear of cervix 01/19/2004    Atrophy present.   • [place] (hard of hearing)    • Kidney disease    • Malabsorption of glucose    • Nondisplaced fracture of right radial styloid process, initial encounter for closed fracture    • Nuclear senile cataract of right eye    • Nuclear senile cataract of right eye    • Obesity    • Pain in  right hand    • PONV (postoperative nausea and vomiting)    • Primary open angle glaucoma     controlled left right IOP increased but OCT stable     • Wears glasses        Past Surgical History:   Procedure Laterality Date   • BREAST SURGERY Right 11/22/1974    Cystic disease, right breast. Excision of lesion of right breast.   • ENDOSCOPY N/A 04/13/2022    Procedure: ESOPHAGOGASTRODUODENOSCOPY;  Surgeon: Luis Manuel Howard MD;  Location: [facility] ENDOSCOPY;  Service: Gastroenterology;  Laterality: N/A;   • ENDOSCOPY N/A 05/03/2022    Procedure: ESOPHAGOGASTRODUODENOSCOPY;  Surgeon: Luis Manuel Howard MD;  Location: [facility] ENDOSCOPY;  Service: Gastroenterology;  Laterality: N/A;   • ENDOSCOPY N/A 9/20/2022    Procedure: ESOPHAGOGASTRODUODENOSCOPY;  Surgeon: Luis Manuel Howard MD;  Location: [facility] ENDOSCOPY;  Service: Gastroenterology;  Laterality: N/A;   • ENDOSCOPY AND COLONOSCOPY  02/11/2008    A few diverticula in the sigmoid.   • ESOPHAGOSCOPY / EGD  03/10/2008    Grade II esophagitis of the distal esophagus. Multiple biopsies were obtained from the distal esophagus. The stomach appeared normal. The duodenum appeared normal   • EXTERNAL EAR SURGERY Left 04/25/2001    Full thickness wedge excision of the left ear, with layered closure   • EYE SURGERY Bilateral 02/19/2013    Remove cataract, insert lens (1)    left eye    • MASTECTOMY Bilateral 01/10/1991    Total mastectomy with level I and II node dissection 2   • TRABECULECTOMY Bilateral     Glaucoma         Current Outpatient Medications:   •  atorvastatin (LIPITOR) 40 MG tablet, Take 40 mg by mouth Every Night., Disp: , Rfl:   •  Calcium-Magnesium-Vitamin D (CALCIUM 500 PO), Take 1 tablet by mouth 2 (Two) Times a Day., Disp: , Rfl:   •  dilTIAZem CD (CARDIZEM CD) 180 MG 24 hr capsule, Take 1 capsule by mouth Daily., Disp: 30 capsule, Rfl: 2  •  dorzolamide-timolol (COSOPT) 22.3-6.8 MG/ML ophthalmic solution, Administer 1 drop to the right eye 2 (Two) Times a Day.,  Disp: , Rfl: 6  •  latanoprost (XALATAN) 0.005 % ophthalmic solution, Administer 1 drop to both eyes Every Night., Disp: , Rfl:   •  losartan (COZAAR) 50 MG tablet, Take 1 tablet by mouth Daily., Disp: 30 tablet, Rfl: 2  •  metoprolol tartrate (LOPRESSOR) 25 MG tablet, Take 1 tablet by mouth Every 12 (Twelve) Hours., Disp: 60 tablet, Rfl: 2  •  Multiple Vitamins-Minerals (MULTIVITAMIN WITH MINERALS) tablet tablet, Take 1 tablet by mouth Daily., Disp: , Rfl:   •  Netarsudil Dimesylate (Rhopressa) 0.02 % solution, Administer 1 drop to the right eye Every Night., Disp: , Rfl:   •  omeprazole (priLOSEC) 20 MG capsule, Take 1 capsule by mouth Daily., Disp: 90 capsule, Rfl: 2  •  polycarbophil (FIBERCON) 625 MG tablet, Take 625 mg by mouth Daily., Disp: , Rfl:   •  HM ASPIRIN EC LOW DOSE PO, Take 81 mg by mouth Daily., Disp: , Rfl:     Allergies   Allergen Reactions   • Sulfa Antibiotics Itching       Family History   Problem Relation Age of Onset   • Cancer Other    • Diabetes Other    • Heart disease Other    • Hypertension Other        Social History     Socioeconomic History   • Marital status:    Tobacco Use   • Smoking status: Never   • Smokeless tobacco: Never   Vaping Use   • Vaping Use: Never used   Substance and Sexual Activity   • Alcohol use: Never   • Drug use: Never   • Sexual activity: Defer       Review of Systems   Constitutional: Negative for appetite change, chills, fever and unexpected weight change.   HENT: Negative for hearing loss, nosebleeds and trouble swallowing.    Eyes: Negative for visual disturbance.   Respiratory: Negative for apnea, cough, choking, chest tightness, shortness of breath, wheezing and stridor.    Cardiovascular: Negative for chest pain, palpitations and leg swelling.   Gastrointestinal: Negative for abdominal distention, abdominal pain, blood in stool, constipation, diarrhea, nausea and vomiting.   Endocrine: Negative for cold intolerance, heat intolerance, polydipsia,  polyphagia and polyuria.   Genitourinary: Negative for difficulty urinating, dysuria, frequency, hematuria and urgency.   Musculoskeletal: Negative for arthralgias, back pain, myalgias and neck pain.   Skin: Negative for color change, pallor and rash.   Allergic/Immunologic: Negative for immunocompromised state.   Neurological: Negative for dizziness, seizures, syncope, light-headedness, numbness and headaches.   Hematological: Negative for adenopathy.   Psychiatric/Behavioral: Negative for suicidal ideas. The patient is not nervous/anxious.        Physical Exam  Vitals reviewed.   Constitutional:       Appearance: Normal appearance.   HENT:      Head: Normocephalic and atraumatic.   Eyes:      Extraocular Movements: Extraocular movements intact.      Pupils: Pupils are equal, round, and reactive to light.   Cardiovascular:      Rate and Rhythm: Normal rate and regular rhythm.   Pulmonary:      Effort: Pulmonary effort is normal. No respiratory distress.   Abdominal:      General: Abdomen is flat. There is no distension.      Palpations: Abdomen is soft. There is no mass.      Tenderness: There is no abdominal tenderness. There is no guarding or rebound.      Hernia: No hernia is present.   Musculoskeletal:         General: Normal range of motion.      Cervical back: Normal range of motion and neck supple.   Skin:     General: Skin is warm and dry.   Neurological:      General: No focal deficit present.      Mental Status: She is alert and oriented to person, place, and time.   Psychiatric:         Mood and Affect: Mood normal.         Behavior: Behavior normal.         Thought Content: Thought content normal.         Judgment: Judgment normal.           ASSESSMENT    Diagnoses and all orders for this visit:    1. Gallbladder mass (Primary)  -     lactated ringers infusion  -     sodium chloride 0.9 % flush 10 mL  -     sodium chloride 0.9 % flush 10 mL  -     sodium chloride 0.9 % infusion 40 mL  -     Case  Request; Standing  -     ceFAZolin (ANCEF) 2 g in sodium chloride 0.9 % 100 mL IVPB  -     Case Request    Other orders  -     Follow Anesthesia Guidelines / Protocol; Future  -     Provide NPO Instructions to Patient; Future  -     Chlorhexidine Skin Prep; Future  -     Follow Anesthesia Guidelines / Protocol; Standing  -     Insert Peripheral IV; Standing  -     Saline Lock & Maintain IV Access; Standing  -     Verify NPO Status; Standing  -     Obtain Informed Consent; Standing  -     SCD (Sequential Compression Device) - Place on Patient in Pre-Op; Standing  -     Verify / Perform Chlorhexidine Skin Prep; Standing        PLAN    1. Laparoscopic possible open cholecystectomy and cholangiogram are planned    The following were discussed with the patient/family:    What are the indications that have led your doctor to the opinion that an operation is necessary?    * Symptoms and studies indicate that there is gallbladder disease causing pain in the abdomen.    What, if any, alternative treatments are available for your condition?    * Watching and waiting with no surgery  * Removing the gallbladder through one large incision made in the abdomen.    What will be the likely result if you don't have the operation?    * Pain, infection, inflammation, and/or stones may continue or get worse    What are the basic procedures involved in the operation?    * The surgery may be done laparoscopically. Laparoscopic surgery is done using a scope and hollow tube(s) called ports. These are inserted through small cuts in the abdomen. A scope is a thin, lighted instrument with a camera attached. Tools are passed through the ports. Carbon dioxide gas is pumped into the abdomen to create workspace.   If the surgery cannot be performed with a scope, it may be done through a larger cut. This occurs 2% of the time.  The gallbladder will be removed after it is  from the liver, bile duct, and surrounding arteries. An x-ray of the  bile ducts is usually performed with contrast dye injected into the ducts.  If stones are found, another procedure may be required to remove them.  A drain may rarely be inserted to keep fluid from building up in the treatment area.     What are the risks?    * Exposure to radiation. Pregnant women and women of childbearing age should talk with their doctor about this.  * Pain, numbness, swelling, weakness or scarring where tissue is cut.  * The gas used in laparoscopic procedures to inflate the abdomen can become trapped in tissues. Gas in the bloodstream can dangerously affect blood flow and  heart function.  * The procedure may not cure or relieve your condition or symptoms. They may come back and even worsen.  * You may need additional tests or treatment.  * Bleeding. You may need blood transfusions or other treatments. This may be discovered during the procedure, or later.  * Embolism. An embolism is an object that moves through your body in your bloodstream. It can be an air bubble, a blood clot, a piece of fat or other material. It can block a blood vessel. This can lead to stroke, pulmonary embolism (blockage of the main artery of the lung), or injury to organs or extremities.  * Reactions to dye used for imaging. These may include hives, swelling of the face and/or throat, difficulty breathing, and kidney failure.  * Retained stones in bile ducts.  * Wound infection, poor healing or reopening. Blood or clear fluid can also collect at the wound site(s).  * Damage to the bile ducts or nearby structures. This may be discovered during the procedure, or later.  * Incisional hernia. Weak scar tissue may allow tissue to bulge through the cut.  * The instrument(s) placed in your abdomen can cause injuries to nearby structures.  * Death.    How is the operation expected to improve your health or quality of life?    * Operation is expected to decrease pain and nausea/vomiting associated with gallstones.  * This  procedure may relieve or prevent infection, inflammation, and/or pain from stones or blockage of bile ducts.    Is hospitalization necessary and, if so, how long can you expect to be hospitalized?    * Most often, the operation is performed on an outpatient basis. Occasionally hospitalization is necessary for pain or nausea control    What can you expect during your recovery period?    * The gas used in laparoscopic procedures to inflate the abdomen can become trapped in tissues. Shoulder pain may occur for a few days after surgery.   * Nausea and pain control are obtained with oral medication.  * If you are on metformin, it will need to be held for 48 hours after surgery    When can you expect to resume normal activities?    * Normal activity can be resumed in 10-14 days if the procedure is performed laparoscopically. Open operations require no lifting or straining for 6 weeks.     Are there likely to be residual effects from the operation?    * Diarrhea often occurs, mostly temporary. Occasionally there is still minor food intolerance.    All questions were answered. The patient agrees to operation.                    This document has been electronically signed by Unruly Espino MD on January 1, 2023 16:33 CST

## 2023-01-01 NOTE — PROGRESS NOTES
Chief Complaint   Patient presents with   • Follow-up     1 week hospital follow up        HPI  82 year old admitted to the hospital with SOB last week. She is improved after medication adjustment. Peripheral imaging demonstrated:    Study Result    Narrative & Impression   Ultrasound gallbladder, right upper quadrant.     HISTORY: Epigastric pain.     FINDINGS: Normal liver. No masses or intrahepatic biliary  dilatation.     There is a 2.9 cm echogenic mass within the dependent portion of  the gallbladder. This may represent either a sludge ball or  gallbladder neoplasm.     No discrete stones are observed.     Normal common bile duct 0.36 image.     No right upper quadrant tenderness.     Normal pancreatic head and neck. Pancreatic body and tail,  obscured by overlying bowel gas.     Normal right kidney 8.3 x 4.0 x 2.7 cm.     IMPRESSION:  Mass within the gallbladder either a sludge ball or  gallbladder neoplasm.     No discrete stones are identified.     Examination is otherwise unremarkable.     Electronically signed by:  Jovon Peralta MD  12/20/2022 1:32 PM CST  Workstation: [id number]     Study Result    Narrative & Impression   CT abdomen, pelvis with contrast.         CLINICAL INDICATION: Pain. Possible biliary obstruction.        COMPARISON: Ultrasound gallbladder December 20, 2022        TECHNIQUE: 85 cc Isovue-300  nonionic IV contrast. Helical  scanning with axial and coronal reformations. Soft tissue, lung,  and bone windows reviewed.     This exam was performed according to our departmental  dose-optimization program, which includes automated exposure  control, adjustment of the mA and/or kV according to patient size  and/or use of iterative reconstruction technique.     ABDOMEN CT FINDINGS: Large hiatus hernia. Abnormal gallbladder  demonstrating gallbladder wall thickening. Questionable mass and  calcifications in the dependent portion of the gallbladder  therefore indicating either stones or cholesterol  polyps. Liver,   spleen, pancreas, adrenal glands and kidneys are normal in  appearance. Endovascular stent proximal left renal artery. Mobile  cecum,, (normal variant, on a  mesentery). Increased stool in the  colon. Bowel otherwise unremarkable.     No evidence of pathologically enlarged nodes, free air, or free  fluid. Mild degenerative changes of the spine.  The bones are otherwise unremarkable.     PELVIS CT FINDINGS: There are no pelvic masses.    No evidence of  pathologically enlarged nodes, free air, or free fluid.    The bones are grossly unremarkable.     IMPRESSION:  Large hiatus hernia. Abnormal gallbladder  demonstrating gallbladder wall thickening, ill-defined  questionable mass in the dependent portion of the gallbladder  with calcifications either stones or cholesterol polyps. The  common bile duct does not appear to be dilated.     Normal liver. No intrahepatic biliary dilatation.      Electronically signed by:  Jovon Peralta MD  12/21/2022 12:08 PM  CST Workstation: [id number]     I have personally reviewed the imaging and concur with the radiologist's findings.    Past Medical History:   Diagnosis Date   • Allergic rhinitis    • Artificial lens present     Artificial lens in position - left      • Benign hypertension    • Breast cancer (HCC)    • CKD (chronic kidney disease)    • Corns and callus    • Dry cough     most likely due to ACE inhibitor     • Elevated cholesterol    • Essential hypertension    • Gallbladder mass    • GERD (gastroesophageal reflux disease)    • Glaucoma, both eyes    • Hammer toe    • Heart murmur    • Hiatal hernia    • History of Papanicolaou smear of cervix 01/19/2004    Atrophy present.   • [place] (hard of hearing)    • Kidney disease    • Malabsorption of glucose    • Nondisplaced fracture of right radial styloid process, initial encounter for closed fracture    • Nuclear senile cataract of right eye    • Nuclear senile cataract of right eye    • Obesity    • Pain in  right hand    • PONV (postoperative nausea and vomiting)    • Primary open angle glaucoma     controlled left right IOP increased but OCT stable     • Wears glasses        Past Surgical History:   Procedure Laterality Date   • BREAST SURGERY Right 11/22/1974    Cystic disease, right breast. Excision of lesion of right breast.   • ENDOSCOPY N/A 04/13/2022    Procedure: ESOPHAGOGASTRODUODENOSCOPY;  Surgeon: Luis Manuel Howard MD;  Location: [facility] ENDOSCOPY;  Service: Gastroenterology;  Laterality: N/A;   • ENDOSCOPY N/A 05/03/2022    Procedure: ESOPHAGOGASTRODUODENOSCOPY;  Surgeon: Luis Manuel Howard MD;  Location: [facility] ENDOSCOPY;  Service: Gastroenterology;  Laterality: N/A;   • ENDOSCOPY N/A 9/20/2022    Procedure: ESOPHAGOGASTRODUODENOSCOPY;  Surgeon: Luis Manuel Howard MD;  Location: [facility] ENDOSCOPY;  Service: Gastroenterology;  Laterality: N/A;   • ENDOSCOPY AND COLONOSCOPY  02/11/2008    A few diverticula in the sigmoid.   • ESOPHAGOSCOPY / EGD  03/10/2008    Grade II esophagitis of the distal esophagus. Multiple biopsies were obtained from the distal esophagus. The stomach appeared normal. The duodenum appeared normal   • EXTERNAL EAR SURGERY Left 04/25/2001    Full thickness wedge excision of the left ear, with layered closure   • EYE SURGERY Bilateral 02/19/2013    Remove cataract, insert lens (1)    left eye    • MASTECTOMY Bilateral 01/10/1991    Total mastectomy with level I and II node dissection 2   • TRABECULECTOMY Bilateral     Glaucoma         Current Outpatient Medications:   •  atorvastatin (LIPITOR) 40 MG tablet, Take 40 mg by mouth Every Night., Disp: , Rfl:   •  Calcium-Magnesium-Vitamin D (CALCIUM 500 PO), Take 1 tablet by mouth 2 (Two) Times a Day., Disp: , Rfl:   •  dilTIAZem CD (CARDIZEM CD) 180 MG 24 hr capsule, Take 1 capsule by mouth Daily., Disp: 30 capsule, Rfl: 2  •  dorzolamide-timolol (COSOPT) 22.3-6.8 MG/ML ophthalmic solution, Administer 1 drop to the right eye 2 (Two) Times a Day.,  Disp: , Rfl: 6  •  latanoprost (XALATAN) 0.005 % ophthalmic solution, Administer 1 drop to both eyes Every Night., Disp: , Rfl:   •  losartan (COZAAR) 50 MG tablet, Take 1 tablet by mouth Daily., Disp: 30 tablet, Rfl: 2  •  metoprolol tartrate (LOPRESSOR) 25 MG tablet, Take 1 tablet by mouth Every 12 (Twelve) Hours., Disp: 60 tablet, Rfl: 2  •  Multiple Vitamins-Minerals (MULTIVITAMIN WITH MINERALS) tablet tablet, Take 1 tablet by mouth Daily., Disp: , Rfl:   •  Netarsudil Dimesylate (Rhopressa) 0.02 % solution, Administer 1 drop to the right eye Every Night., Disp: , Rfl:   •  omeprazole (priLOSEC) 20 MG capsule, Take 1 capsule by mouth Daily., Disp: 90 capsule, Rfl: 2  •  polycarbophil (FIBERCON) 625 MG tablet, Take 625 mg by mouth Daily., Disp: , Rfl:   •  HM ASPIRIN EC LOW DOSE PO, Take 81 mg by mouth Daily., Disp: , Rfl:     Allergies   Allergen Reactions   • Sulfa Antibiotics Itching       Family History   Problem Relation Age of Onset   • Cancer Other    • Diabetes Other    • Heart disease Other    • Hypertension Other        Social History     Socioeconomic History   • Marital status:    Tobacco Use   • Smoking status: Never   • Smokeless tobacco: Never   Vaping Use   • Vaping Use: Never used   Substance and Sexual Activity   • Alcohol use: Never   • Drug use: Never   • Sexual activity: Defer       Review of Systems   Constitutional: Negative for appetite change, chills, fever and unexpected weight change.   HENT: Negative for hearing loss, nosebleeds and trouble swallowing.    Eyes: Negative for visual disturbance.   Respiratory: Negative for apnea, cough, choking, chest tightness, shortness of breath, wheezing and stridor.    Cardiovascular: Negative for chest pain, palpitations and leg swelling.   Gastrointestinal: Negative for abdominal distention, abdominal pain, blood in stool, constipation, diarrhea, nausea and vomiting.   Endocrine: Negative for cold intolerance, heat intolerance, polydipsia,  polyphagia and polyuria.   Genitourinary: Negative for difficulty urinating, dysuria, frequency, hematuria and urgency.   Musculoskeletal: Negative for arthralgias, back pain, myalgias and neck pain.   Skin: Negative for color change, pallor and rash.   Allergic/Immunologic: Negative for immunocompromised state.   Neurological: Negative for dizziness, seizures, syncope, light-headedness, numbness and headaches.   Hematological: Negative for adenopathy.   Psychiatric/Behavioral: Negative for suicidal ideas. The patient is not nervous/anxious.        Physical Exam  Vitals reviewed.   Constitutional:       Appearance: Normal appearance.   HENT:      Head: Normocephalic and atraumatic.   Eyes:      Extraocular Movements: Extraocular movements intact.      Pupils: Pupils are equal, round, and reactive to light.   Cardiovascular:      Rate and Rhythm: Normal rate and regular rhythm.   Pulmonary:      Effort: Pulmonary effort is normal. No respiratory distress.   Abdominal:      General: Abdomen is flat. There is no distension.      Palpations: Abdomen is soft. There is no mass.      Tenderness: There is no abdominal tenderness. There is no guarding or rebound.      Hernia: No hernia is present.   Musculoskeletal:         General: Normal range of motion.      Cervical back: Normal range of motion and neck supple.   Skin:     General: Skin is warm and dry.   Neurological:      General: No focal deficit present.      Mental Status: She is alert and oriented to person, place, and time.   Psychiatric:         Mood and Affect: Mood normal.         Behavior: Behavior normal.         Thought Content: Thought content normal.         Judgment: Judgment normal.           ASSESSMENT    Diagnoses and all orders for this visit:    1. Gallbladder mass (Primary)  -     lactated ringers infusion  -     sodium chloride 0.9 % flush 10 mL  -     sodium chloride 0.9 % flush 10 mL  -     sodium chloride 0.9 % infusion 40 mL  -     Case  Request; Standing  -     ceFAZolin (ANCEF) 2 g in sodium chloride 0.9 % 100 mL IVPB  -     Case Request    Other orders  -     Follow Anesthesia Guidelines / Protocol; Future  -     Provide NPO Instructions to Patient; Future  -     Chlorhexidine Skin Prep; Future  -     Follow Anesthesia Guidelines / Protocol; Standing  -     Insert Peripheral IV; Standing  -     Saline Lock & Maintain IV Access; Standing  -     Verify NPO Status; Standing  -     Obtain Informed Consent; Standing  -     SCD (Sequential Compression Device) - Place on Patient in Pre-Op; Standing  -     Verify / Perform Chlorhexidine Skin Prep; Standing        PLAN    1. Laparoscopic possible open cholecystectomy and cholangiogram are planned    The following were discussed with the patient/family:    What are the indications that have led your doctor to the opinion that an operation is necessary?    * Symptoms and studies indicate that there is gallbladder disease causing pain in the abdomen.    What, if any, alternative treatments are available for your condition?    * Watching and waiting with no surgery  * Removing the gallbladder through one large incision made in the abdomen.    What will be the likely result if you don't have the operation?    * Pain, infection, inflammation, and/or stones may continue or get worse    What are the basic procedures involved in the operation?    * The surgery may be done laparoscopically. Laparoscopic surgery is done using a scope and hollow tube(s) called ports. These are inserted through small cuts in the abdomen. A scope is a thin, lighted instrument with a camera attached. Tools are passed through the ports. Carbon dioxide gas is pumped into the abdomen to create workspace.   If the surgery cannot be performed with a scope, it may be done through a larger cut. This occurs 2% of the time.  The gallbladder will be removed after it is  from the liver, bile duct, and surrounding arteries. An x-ray of the  bile ducts is usually performed with contrast dye injected into the ducts.  If stones are found, another procedure may be required to remove them.  A drain may rarely be inserted to keep fluid from building up in the treatment area.     What are the risks?    * Exposure to radiation. Pregnant women and women of childbearing age should talk with their doctor about this.  * Pain, numbness, swelling, weakness or scarring where tissue is cut.  * The gas used in laparoscopic procedures to inflate the abdomen can become trapped in tissues. Gas in the bloodstream can dangerously affect blood flow and  heart function.  * The procedure may not cure or relieve your condition or symptoms. They may come back and even worsen.  * You may need additional tests or treatment.  * Bleeding. You may need blood transfusions or other treatments. This may be discovered during the procedure, or later.  * Embolism. An embolism is an object that moves through your body in your bloodstream. It can be an air bubble, a blood clot, a piece of fat or other material. It can block a blood vessel. This can lead to stroke, pulmonary embolism (blockage of the main artery of the lung), or injury to organs or extremities.  * Reactions to dye used for imaging. These may include hives, swelling of the face and/or throat, difficulty breathing, and kidney failure.  * Retained stones in bile ducts.  * Wound infection, poor healing or reopening. Blood or clear fluid can also collect at the wound site(s).  * Damage to the bile ducts or nearby structures. This may be discovered during the procedure, or later.  * Incisional hernia. Weak scar tissue may allow tissue to bulge through the cut.  * The instrument(s) placed in your abdomen can cause injuries to nearby structures.  * Death.    How is the operation expected to improve your health or quality of life?    * Operation is expected to decrease pain and nausea/vomiting associated with gallstones.  * This  procedure may relieve or prevent infection, inflammation, and/or pain from stones or blockage of bile ducts.    Is hospitalization necessary and, if so, how long can you expect to be hospitalized?    * Most often, the operation is performed on an outpatient basis. Occasionally hospitalization is necessary for pain or nausea control    What can you expect during your recovery period?    * The gas used in laparoscopic procedures to inflate the abdomen can become trapped in tissues. Shoulder pain may occur for a few days after surgery.   * Nausea and pain control are obtained with oral medication.  * If you are on metformin, it will need to be held for 48 hours after surgery    When can you expect to resume normal activities?    * Normal activity can be resumed in 10-14 days if the procedure is performed laparoscopically. Open operations require no lifting or straining for 6 weeks.     Are there likely to be residual effects from the operation?    * Diarrhea often occurs, mostly temporary. Occasionally there is still minor food intolerance.    All questions were answered. The patient agrees to operation.                    This document has been electronically signed by Unruly Espino MD on January 1, 2023 16:33 CST

## 2023-01-03 ENCOUNTER — ANESTHESIA EVENT (OUTPATIENT)
Dept: PERIOP | Facility: HOSPITAL | Age: 83
End: 2023-01-03
Payer: MEDICARE

## 2023-01-04 ENCOUNTER — HOSPITAL ENCOUNTER (OUTPATIENT)
Facility: HOSPITAL | Age: 83
Setting detail: HOSPITAL OUTPATIENT SURGERY
Discharge: HOME | End: 2023-01-04
Attending: SURGERY | Admitting: SURGERY
Payer: MEDICARE

## 2023-01-04 ENCOUNTER — ANESTHESIA (OUTPATIENT)
Dept: PERIOP | Facility: HOSPITAL | Age: 83
End: 2023-01-04
Payer: MEDICARE

## 2023-01-04 ENCOUNTER — APPOINTMENT (OUTPATIENT)
Dept: GENERAL RADIOLOGY | Facility: HOSPITAL | Age: 83
End: 2023-01-04
Payer: MEDICARE

## 2023-01-04 VITALS
HEIGHT: 63 IN | OXYGEN SATURATION: 98 % | HEART RATE: 68 BPM | WEIGHT: 149.47 LBS | SYSTOLIC BLOOD PRESSURE: 171 MMHG | TEMPERATURE: 96.8 F | BODY MASS INDEX: 26.48 KG/M2 | DIASTOLIC BLOOD PRESSURE: 78 MMHG | RESPIRATION RATE: 16 BRPM

## 2023-01-04 DIAGNOSIS — K82.8 GALLBLADDER MASS: Primary | ICD-10-CM

## 2023-01-04 PROCEDURE — 76000 FLUOROSCOPY <1 HR PHYS/QHP: CPT

## 2023-01-04 PROCEDURE — 25010000002 FENTANYL CITRATE (PF) 50 MCG/ML SOLUTION: Performed by: NURSE ANESTHETIST, CERTIFIED REGISTERED

## 2023-01-04 PROCEDURE — 25010000002 IOPAMIDOL 61 % SOLUTION: Performed by: SURGERY

## 2023-01-04 PROCEDURE — 25010000002 PROPOFOL 200 MG/20ML EMULSION: Performed by: NURSE ANESTHETIST, CERTIFIED REGISTERED

## 2023-01-04 PROCEDURE — 25010000002 CEFAZOLIN PER 500 MG: Performed by: SURGERY

## 2023-01-04 PROCEDURE — 25010000002 DEXAMETHASONE PER 1 MG: Performed by: NURSE ANESTHETIST, CERTIFIED REGISTERED

## 2023-01-04 PROCEDURE — 25010000002 EPINEPHRINE (ANAPHYLAXIS) 1 MG/ML SOLUTION

## 2023-01-04 PROCEDURE — 47563 LAPARO CHOLECYSTECTOMY/GRAPH: CPT | Performed by: SURGERY

## 2023-01-04 PROCEDURE — 25010000002 NEOSTIGMINE 10 MG/10ML SOLUTION: Performed by: NURSE ANESTHETIST, CERTIFIED REGISTERED

## 2023-01-04 PROCEDURE — 88304 TISSUE EXAM BY PATHOLOGIST: CPT

## 2023-01-04 PROCEDURE — 25010000002 MIDAZOLAM PER 1 MG: Performed by: NURSE ANESTHETIST, CERTIFIED REGISTERED

## 2023-01-04 PROCEDURE — 25010000002 HYDROMORPHONE 1 MG/ML SOLUTION: Performed by: NURSE ANESTHETIST, CERTIFIED REGISTERED

## 2023-01-04 PROCEDURE — 25010000002 ONDANSETRON PER 1 MG: Performed by: NURSE ANESTHETIST, CERTIFIED REGISTERED

## 2023-01-04 DEVICE — HEMOST ABS SURGICEL SNOW 1X2IN: Type: IMPLANTABLE DEVICE | Site: ABDOMEN | Status: FUNCTIONAL

## 2023-01-04 DEVICE — LIGAMAX 5 MM ENDOSCOPIC MULTIPLE CLIP APPLIER
Type: IMPLANTABLE DEVICE | Site: ABDOMEN | Status: FUNCTIONAL
Brand: LIGAMAX

## 2023-01-04 RX ORDER — MIDAZOLAM HYDROCHLORIDE 1 MG/ML
INJECTION INTRAMUSCULAR; INTRAVENOUS AS NEEDED
Status: DISCONTINUED | OUTPATIENT
Start: 2023-01-04 | End: 2023-01-04 | Stop reason: SURG

## 2023-01-04 RX ORDER — SODIUM CHLORIDE 9 MG/ML
40 INJECTION, SOLUTION INTRAVENOUS AS NEEDED
Status: DISCONTINUED | OUTPATIENT
Start: 2023-01-04 | End: 2023-01-04 | Stop reason: HOSPADM

## 2023-01-04 RX ORDER — ONDANSETRON 2 MG/ML
INJECTION INTRAMUSCULAR; INTRAVENOUS AS NEEDED
Status: DISCONTINUED | OUTPATIENT
Start: 2023-01-04 | End: 2023-01-04 | Stop reason: SURG

## 2023-01-04 RX ORDER — BUPIVACAINE HCL/0.9 % NACL/PF 0.1 %
2 PLASTIC BAG, INJECTION (ML) EPIDURAL ONCE
Status: COMPLETED | OUTPATIENT
Start: 2023-01-04 | End: 2023-01-04

## 2023-01-04 RX ORDER — LIDOCAINE HYDROCHLORIDE 10 MG/ML
INJECTION, SOLUTION EPIDURAL; INFILTRATION; INTRACAUDAL; PERINEURAL AS NEEDED
Status: DISCONTINUED | OUTPATIENT
Start: 2023-01-04 | End: 2023-01-04 | Stop reason: SURG

## 2023-01-04 RX ORDER — HYDROCODONE BITARTRATE AND ACETAMINOPHEN 5; 325 MG/1; MG/1
1 TABLET ORAL EVERY 4 HOURS PRN
Qty: 15 TABLET | Refills: 0 | Status: SHIPPED | OUTPATIENT
Start: 2023-01-04 | End: 2023-02-02

## 2023-01-04 RX ORDER — EPHEDRINE SULFATE 50 MG/ML
5 INJECTION, SOLUTION INTRAVENOUS ONCE AS NEEDED
Status: DISCONTINUED | OUTPATIENT
Start: 2023-01-04 | End: 2023-01-04 | Stop reason: HOSPADM

## 2023-01-04 RX ORDER — ACETAMINOPHEN 650 MG/1
650 SUPPOSITORY RECTAL ONCE AS NEEDED
Status: DISCONTINUED | OUTPATIENT
Start: 2023-01-04 | End: 2023-01-04 | Stop reason: HOSPADM

## 2023-01-04 RX ORDER — ACETAMINOPHEN 325 MG/1
650 TABLET ORAL ONCE AS NEEDED
Status: DISCONTINUED | OUTPATIENT
Start: 2023-01-04 | End: 2023-01-04 | Stop reason: HOSPADM

## 2023-01-04 RX ORDER — PROMETHAZINE HYDROCHLORIDE 25 MG/1
25 SUPPOSITORY RECTAL ONCE AS NEEDED
Status: DISCONTINUED | OUTPATIENT
Start: 2023-01-04 | End: 2023-01-04 | Stop reason: HOSPADM

## 2023-01-04 RX ORDER — FLUMAZENIL 0.1 MG/ML
0.2 INJECTION INTRAVENOUS AS NEEDED
Status: DISCONTINUED | OUTPATIENT
Start: 2023-01-04 | End: 2023-01-04 | Stop reason: HOSPADM

## 2023-01-04 RX ORDER — SODIUM CHLORIDE 0.9 % (FLUSH) 0.9 %
10 SYRINGE (ML) INJECTION EVERY 12 HOURS SCHEDULED
Status: DISCONTINUED | OUTPATIENT
Start: 2023-01-04 | End: 2023-01-04 | Stop reason: HOSPADM

## 2023-01-04 RX ORDER — PROMETHAZINE HYDROCHLORIDE 25 MG/1
25 TABLET ORAL ONCE AS NEEDED
Status: DISCONTINUED | OUTPATIENT
Start: 2023-01-04 | End: 2023-01-04 | Stop reason: HOSPADM

## 2023-01-04 RX ORDER — SODIUM CHLORIDE 0.9 % (FLUSH) 0.9 %
10 SYRINGE (ML) INJECTION AS NEEDED
Status: DISCONTINUED | OUTPATIENT
Start: 2023-01-04 | End: 2023-01-04 | Stop reason: HOSPADM

## 2023-01-04 RX ORDER — DEXAMETHASONE SODIUM PHOSPHATE 4 MG/ML
INJECTION, SOLUTION INTRA-ARTICULAR; INTRALESIONAL; INTRAMUSCULAR; INTRAVENOUS; SOFT TISSUE AS NEEDED
Status: DISCONTINUED | OUTPATIENT
Start: 2023-01-04 | End: 2023-01-04 | Stop reason: SURG

## 2023-01-04 RX ORDER — NALOXONE HCL 0.4 MG/ML
0.4 VIAL (ML) INJECTION AS NEEDED
Status: DISCONTINUED | OUTPATIENT
Start: 2023-01-04 | End: 2023-01-04 | Stop reason: HOSPADM

## 2023-01-04 RX ORDER — PROPOFOL 10 MG/ML
INJECTION, EMULSION INTRAVENOUS AS NEEDED
Status: DISCONTINUED | OUTPATIENT
Start: 2023-01-04 | End: 2023-01-04 | Stop reason: SURG

## 2023-01-04 RX ORDER — NEOSTIGMINE METHYLSULFATE 1 MG/ML
INJECTION, SOLUTION INTRAVENOUS AS NEEDED
Status: DISCONTINUED | OUTPATIENT
Start: 2023-01-04 | End: 2023-01-04 | Stop reason: SURG

## 2023-01-04 RX ORDER — 0.9 % SODIUM CHLORIDE 0.9 %
VIAL (ML) INJECTION AS NEEDED
Status: DISCONTINUED | OUTPATIENT
Start: 2023-01-04 | End: 2023-01-04 | Stop reason: HOSPADM

## 2023-01-04 RX ORDER — ONDANSETRON 2 MG/ML
4 INJECTION INTRAMUSCULAR; INTRAVENOUS ONCE AS NEEDED
Status: DISCONTINUED | OUTPATIENT
Start: 2023-01-04 | End: 2023-01-04 | Stop reason: HOSPADM

## 2023-01-04 RX ORDER — SODIUM CHLORIDE, SODIUM LACTATE, POTASSIUM CHLORIDE, CALCIUM CHLORIDE 600; 310; 30; 20 MG/100ML; MG/100ML; MG/100ML; MG/100ML
100 INJECTION, SOLUTION INTRAVENOUS CONTINUOUS
Status: DISCONTINUED | OUTPATIENT
Start: 2023-01-04 | End: 2023-01-04 | Stop reason: HOSPADM

## 2023-01-04 RX ORDER — FENTANYL CITRATE 50 UG/ML
INJECTION, SOLUTION INTRAMUSCULAR; INTRAVENOUS AS NEEDED
Status: DISCONTINUED | OUTPATIENT
Start: 2023-01-04 | End: 2023-01-04 | Stop reason: SURG

## 2023-01-04 RX ORDER — DIPHENHYDRAMINE HYDROCHLORIDE 50 MG/ML
12.5 INJECTION INTRAMUSCULAR; INTRAVENOUS
Status: DISCONTINUED | OUTPATIENT
Start: 2023-01-04 | End: 2023-01-04 | Stop reason: HOSPADM

## 2023-01-04 RX ORDER — VECURONIUM BROMIDE 1 MG/ML
INJECTION, POWDER, LYOPHILIZED, FOR SOLUTION INTRAVENOUS AS NEEDED
Status: DISCONTINUED | OUTPATIENT
Start: 2023-01-04 | End: 2023-01-04 | Stop reason: SURG

## 2023-01-04 RX ADMIN — MIDAZOLAM 1 MG: 1 INJECTION, SOLUTION INTRAMUSCULAR; INTRAVENOUS at 12:39

## 2023-01-04 RX ADMIN — HYDROMORPHONE HYDROCHLORIDE 0.5 MG: 1 INJECTION, SOLUTION INTRAMUSCULAR; INTRAVENOUS; SUBCUTANEOUS at 14:45

## 2023-01-04 RX ADMIN — DEXAMETHASONE SODIUM PHOSPHATE 4 MG: 4 INJECTION, SOLUTION INTRAMUSCULAR; INTRAVENOUS at 13:15

## 2023-01-04 RX ADMIN — GLYCOPYRROLATE 0.4 MCG: 0.2 INJECTION, SOLUTION INTRAMUSCULAR; INTRAVITREAL at 14:01

## 2023-01-04 RX ADMIN — SODIUM CHLORIDE, POTASSIUM CHLORIDE, SODIUM LACTATE AND CALCIUM CHLORIDE 100 ML/HR: 600; 310; 30; 20 INJECTION, SOLUTION INTRAVENOUS at 10:05

## 2023-01-04 RX ADMIN — Medication 2 G: at 12:50

## 2023-01-04 RX ADMIN — ONDANSETRON 4 MG: 2 INJECTION INTRAMUSCULAR; INTRAVENOUS at 13:55

## 2023-01-04 RX ADMIN — HYDROMORPHONE HYDROCHLORIDE 0.5 MG: 1 INJECTION, SOLUTION INTRAMUSCULAR; INTRAVENOUS; SUBCUTANEOUS at 14:35

## 2023-01-04 RX ADMIN — NEOSTIGMINE METHYLSULFATE 2 MG: 0.5 INJECTION INTRAVENOUS at 14:03

## 2023-01-04 RX ADMIN — PROPOFOL 80 MG: 10 INJECTION, EMULSION INTRAVENOUS at 12:48

## 2023-01-04 RX ADMIN — VECURONIUM BROMIDE 6 MG: 1 INJECTION, POWDER, LYOPHILIZED, FOR SOLUTION INTRAVENOUS at 12:48

## 2023-01-04 RX ADMIN — FENTANYL CITRATE 25 MCG: 50 INJECTION, SOLUTION INTRAMUSCULAR; INTRAVENOUS at 12:48

## 2023-01-04 RX ADMIN — FENTANYL CITRATE 25 MCG: 50 INJECTION, SOLUTION INTRAMUSCULAR; INTRAVENOUS at 13:15

## 2023-01-04 RX ADMIN — MIDAZOLAM 1 MG: 1 INJECTION, SOLUTION INTRAMUSCULAR; INTRAVENOUS at 12:48

## 2023-01-04 RX ADMIN — LIDOCAINE HYDROCHLORIDE 40 MG: 10 INJECTION, SOLUTION EPIDURAL; INFILTRATION; INTRACAUDAL; PERINEURAL at 12:48

## 2023-01-04 NOTE — INTERVAL H&P NOTE
H&P reviewed. The patient was examined and there are no changes to the H&P.      Temp:  [96.9 °F (36.1 °C)] 96.9 °F (36.1 °C)  Heart Rate:  [65] 65  Resp:  [18] 18  BP: (171)/(80) 171/80

## 2023-01-04 NOTE — DISCHARGE INSTRUCTIONS
Dr. Unruly Espino  [address] [place] [address]  [phone] (office)  [phone] (hospital)  [phone] (cell)  Discharge Instructions for Gall Bladder Surgery      Go home, rest and take it easy today; however, you should get up and move about several times today to reduce the risk of developing a clot in your legs.    You may experience some dizziness or memory loss from the anesthesia.  This may last for the next 24 hours.  Someone should plan on staying with you for the first 24 hours for your safety.  Do not make any important legal decisions or sign any legal papers for the next 24 hours.    Eat and drink lightly today.  Start off with liquids, jello, soup, crackers or other bland foods at first. You may advance your diet tomorrow as tolerated as long as you do not experience any nausea or vomiting.   You may remove your outer dressings in 3 days.  The white tapes called steri-strips should stay in place.  They will fall off on their own in 1-2 weeks.  Do not worry if they come off sooner.    You may notice some bleeding/drainage on your outer dressings. A little bloody drainage is normal. If the bleeding/drainage is such that the bandage cannot absorb it, remove the dressing, apply clean gauze and apply firm pressure for a full 15 minutes.  If the bleeding continues, please call me.  You may shower tomorrow.  No tub baths for at least 1 week until your incisions are completely healed.       You have received a prescription for a narcotic pain medicine, as you will have some pain following surgery.   You will not be totally pain free, but your pain medicine should make the pain tolerable.  Please take your pain medicine as prescribed and always take your pills with food to prevent nausea. If you are having severe pain that cannot be controlled by the pain medicine, please contact me.    If needed, you may receive a prescription for an anti-nausea medicine.  Please take this as prescribed for any  nausea or vomiting.  Nausea could be a result of the anesthesia or a result of the narcotic pain medicine.  If you experience severe nausea and vomiting that cannot be controlled by the nausea medicine, please call me.    It is not unusual to experience pain/discomfort in your shoulders or under your ribs after surgery.  It is from the gas used during the laparoscopic procedure and usually lasts 1-3 days.  The prescription pain medicine is used to treat the surgical pain and does not typically alleviate this “gassy” pain.   No driving for 24 hours and for as long as you are taking your prescription pain medicine.     If an appointment is not given to you before discharge, you will need to call the office       at [phone] to schedule a follow-up appointment in 5-7 days.   Remember to contact me for any of the following:    Fever > 101 degrees  Severe pain that cannot be controlled by taking your pain pills  Severe nausea or vomiting that cannot be controlled by taking your nausea pills  Significant bleeding of your incisions  Drainage that has a bad smell or is yellow or green in appearance  Any other questions or concerns

## 2023-01-04 NOTE — ANESTHESIA PREPROCEDURE EVALUATION
Anesthesia Evaluation     Patient summary reviewed and Nursing notes reviewed   history of anesthetic complications: PONV  NPO Solid Status: > 8 hours  NPO Liquid Status: > 2 hours           Airway   Mallampati: II  TM distance: >3 FB  Neck ROM: full  possible difficult intubation  Dental    (+) poor dentation    Pulmonary - negative pulmonary ROS    breath sounds clear to auscultation  (-) shortness of breath, not a smoker    ROS comment: FINDINGS:   EKG leads.  Moderate-sized hiatal hernia.  The lungs are clear of an acute process.  Linear scarring upper lobes.  The heart is not enlarged.  The pulmonary vasculature is not increased.  No pleural effusion.  No pneumothorax.  No acute osseous abnormality.  Degenerative changes are present in the thoracic spine.     IMPRESSION:  CONCLUSION:  Moderate-sized hiatal hernia.  The lungs are clear of an acute process.     [id number]     Electronically signed by:  Jamir Castrejon MD  12/20/2022 10:04 AM  Cardiovascular     ECG reviewed  Patient on routine beta blocker and Beta blocker given within 24 hours of surgery  Rhythm: regular  Rate: normal    (+) hypertension, valvular problems/murmurs murmur and TI, murmur (Grade I-II/VI systolic), hyperlipidemia,   (-) angina, ALCAZAR, carotid bruits, cardiac stents    ROS comment: Normal sinus rhythm  Nonspecific T wave abnormality  Abnormal ECG  When compared with ECG of 04-NOV-2022 15:12,  Nonspecific T wave abnormality now evident in Anterior leads     Referred By:            Confirmed By: KENJI MESSER MD•  Left ventricular systolic function is normal. Left ventricular ejection fraction appears to be 66 - 70%.  •  Left ventricular diastolic function was normal.  •  Left atrial volume is mildly increased.  •  Estimated right ventricular systolic pressure from tricuspid regurgitation is moderately elevated (45-55 mmHg).  •  Mild pulmonary hypertension is present.      Neuro/Psych- negative ROS  (-) TIA, CVA  GI/Hepatic/Renal/Endo    (+)  obesity,  hiatal hernia, GERD well controlled,    Renal disease: Creatinine 0.86.    Musculoskeletal     Abdominal   (+) obese,    Substance History - negative use     OB/GYN negative ob/gyn ROS         Other   arthritis,    history of cancer (Breast treated.) remission    ROS/Med Hx Other: History of glaucoma with OS upper lid deformation.                  Anesthesia Plan    ASA 3     general     intravenous induction     Anesthetic plan, risks, benefits, and alternatives have been provided, discussed and informed consent has been obtained with: patient.  Pre-procedure education provided  Plan discussed with CRNA.        CODE STATUS:

## 2023-01-04 NOTE — OP NOTE
CHOLECYSTECTOMY LAPAROSCOPIC INTRAOPERATIVE CHOLANGIOGRAM  Procedure Note    Lyndsay Brumfield  1/4/2023    Pre-op Diagnosis:   Gallbladder mass [K82.8]    Post-op Diagnosis:     Post-Op Diagnosis Codes:     * Gallbladder mass [K82.8]    Procedure:  LAPAROSCOPIC CHOLECYSTECTOMY WITH INTRAOPERATIVE CHOLANGIOGRAM          (c-arm#1)  INTERPRETATION OF CHOLANGIOGRAM    Surgeon:  Unruly Espino MD    Resident Surgeon:  Essie Garland MD    Anesthesia: General    Staff:   Circulator: Zeeshan Oneil RN  Scrub Person: Meron Velazquez  Assistant: Jackie Mercedes    Assistant: Jackie Mercedes was responsible for performing the following activities: Retraction, Suction, Suturing, Closing, Placing Dressing and Held/Positioned Camera and their skilled assistance was necessary for the success of this case.     Estimated Blood Loss: none    Specimens:                ID Type Source Tests Collected by Time   A : GALLBLADDER AND CONTENTS Tissue Gallbladder TISSUE EXAM, [facility]) nUruly Espino MD 1/4/2023 1247         Drains: * No LDAs found *    Findings: No gallbladder mass; large amount of sludge within the gallbladder     Complications: None    INDICATION:  This is a 82-year-old with epigastric and right upper quadrant abdominal pain; positive  ultrasound for either a sludge mass or a polyp of the gallbladder.  Taken to the operating room for laparoscopic cholecystectomy.    DESCRIPTION:  The patient was brought to the operating room and placed supine on the operating table. After adequate general endotracheal anesthesia, the abdominal area was prepped and draped in a sterile manner. A briefing and timeout were performed and all parties were in agreement.     A transverse incision was made slightly to the right of the midline in the epigastrium subcostally. A 5 mm XCEL trocar was uneventfully passed into the abdomen under direct vision with no visceral injury. The abdomen was insufflated to a total  of 15 mmHg, 4.8 L of CO2. A 5 mm laparoscope revealed an excellent view of the upper and lower abdominal areas. A longitudinal skin incision was made at the umbilicus and a 5 mm  XCEL trocar was placed under direct vision with no visceral injury. The camera was then moved to the umbilical port site and an excellent view of the upper abdomen was obtained. An incision was made at the tip of the 12th rib on the right side and carried into the subcutaneous tissue. A 5 mm  XCEL trocar was uneventfully passed into the abdomen under direct vision with no visceral injury.  A fourth 5 mm  XCEL trocar was placed in the midclavicular line subcostally on the right side under direct vision.  The epigastric port site was upsized to an 11 mm trocar. The bed was then tilted in reverse Trendelenburg and tipped to the left. The patient tolerated the positioning and insufflation without difficulty.    The gallbladder was retracted upwards and outwards by grasping the top and the infundibulum of the gallbladder with atraumatic graspers passed through the lateral ports. The peritoneum around the infundibulum was taken down for a distance of 1/3 of the length of the gallbladder on the anterior and posterior sides. The cystic duct and artery easily came into view as did the 5th segment of the liver behind these structures.     A Nelson clamp was placed across the infundibulum and a fluoroscopic cholangiogram was performed by piercing the infundibulum with a needle and injecting contrast. This demonstrated good filling proximally and distally, intact bile ducts, no stones in the common duct, and good emptying into the duodenum.       The cholangiocath was removed and the cystic duct was singly clipped and divided.  It was further secured with a PDS Endoloop.  The cystic artery was doubly clipped and divided in all branches. The gallbladder was then dissected out of the liver bed using electrocautery. Once it had been nearly completely  excised, pressure in the abdomen was reduced to 8 mmHg with no further bleeding being noted from the liver bed. The remainder of the gallbladder was dissected free.  It was placed into an Endobag and brought out intact through the epigastric port.     All areas were visualized for bleeding and bile leak. None was seen. The patient was then placed supine.  The epigastric trocar was removed and the fascia was closed with 0 Vicryl using an Endo Close device.  The remainder of the trocars were removed and the abdomen was allowed to flatten. All port sites were closed with 4-0 subcuticular on the skin.    The procedure was terminated. It was well tolerated. Sponge and needle counts were correct, and the patient was transferred to recovery in satisfactory condition.            This document has been electronically signed by Unruly Espino MD on January 4, 2023 15:24 CST       Date: 1/4/2023  Time: 15:24 CST

## 2023-01-04 NOTE — ANESTHESIA POSTPROCEDURE EVALUATION
Patient: Lyndsay Brumfield    Procedure Summary     Date: 01/04/23 Room / Location: [facility] OR  / [facility] OR    Anesthesia Start: 1240 Anesthesia Stop: 1419    Procedure: LAPAROSCOPIC POSSIBLE OPEN CHOLECYSTECTOMY WITH INTRAOPERATIVE CHOLANGIOGRAM          (c-arm#1) (Abdomen) Diagnosis:       Gallbladder mass      (Gallbladder mass [K82.8])    Surgeons: Unruly Espino MD Provider: Jen Hodge CRNA    Anesthesia Type: general ASA Status: 3          Anesthesia Type: general    Vitals  Vitals Value Taken Time   /78 01/04/23 1415   Temp 97.5 °F (36.4 °C) 01/04/23 1415   Pulse 71 01/04/23 1415   Resp 18 01/04/23 1415   SpO2 100 % 01/04/23 1415           Post Anesthesia Care and Evaluation    Patient location during evaluation: PACU  Patient participation: complete - patient cannot participate  Level of consciousness: sleepy but conscious  Pain score: 0  Pain management: adequate    Airway patency: patent  Anesthetic complications: No anesthetic complications  PONV Status: none  Cardiovascular status: acceptable  Respiratory status: acceptable  Hydration status: acceptable    Comments: 71  17  97.5  100%  186/78    No anesthesia care post op

## 2023-01-04 NOTE — ANESTHESIA PROCEDURE NOTES
Airway  Urgency: elective    Date/Time: 1/4/2023 12:52 PM  End Time:1/4/2023 12:53 PM  Airway not difficult    General Information and Staff    Patient location during procedure: OR  CRNA/CAA: Jen Hodge CRNA  SRNA: Jad Herron SRNA  Indications and Patient Condition  Indications for airway management: airway protection    Preoxygenated: yes  MILS maintained throughout  Mask difficulty assessment: 2 - vent by mask + OA or adjuvant +/- NMBA    Final Airway Details  Final airway type: endotracheal airway      Successful airway: ETT  Cuffed: yes   Successful intubation technique: direct laryngoscopy  Facilitating devices/methods: intubating stylet  Endotracheal tube insertion site: oral  Blade: Lizabeth  Blade size: 3  ETT size (mm): 7.0  Cormack-Lehane Classification: grade IIa - partial view of glottis  Placement verified by: chest auscultation and capnometry   Measured from: lips  ETT/EBT  to lips (cm): 20  Number of attempts at approach: 1  Assessment: lips, teeth, and gum same as pre-op and atraumatic intubation

## 2023-01-04 NOTE — ADDENDUM NOTE
Addendum  created 01/04/23 1446 by Jen Hodge CRNA    Order list changed, Pharmacy for encounter modified

## 2023-01-09 LAB — REF LAB TEST METHOD: NORMAL

## 2023-01-11 ENCOUNTER — OFFICE VISIT (OUTPATIENT)
Dept: SURGERY | Facility: CLINIC | Age: 83
End: 2023-01-11
Payer: MEDICARE

## 2023-01-11 VITALS
DIASTOLIC BLOOD PRESSURE: 67 MMHG | BODY MASS INDEX: 26.72 KG/M2 | WEIGHT: 150.8 LBS | OXYGEN SATURATION: 99 % | SYSTOLIC BLOOD PRESSURE: 120 MMHG | HEIGHT: 63 IN | TEMPERATURE: 97.7 F | HEART RATE: 82 BPM

## 2023-01-11 DIAGNOSIS — Z90.49 S/P LAPAROSCOPIC CHOLECYSTECTOMY: Primary | ICD-10-CM

## 2023-01-11 PROCEDURE — 99024 POSTOP FOLLOW-UP VISIT: CPT | Performed by: NURSE PRACTITIONER

## 2023-01-11 NOTE — PROGRESS NOTES
CHIEF COMPLAINT:   Chief Complaint   Patient presents with   • Post-op     Lap. Orin 1-4-23       HPI: This patient presents for a post-operative visit after undergoing a laparoscopic Cholecystectomy with normal intraoperative cholangiogram by Dr. Espino.    Patient reports no problems. Eating well without any significant nausea. Having good bowel function. No problems with constipation or diarrhea. No urinary complaints. Denies fever. Ambulating well and slowly returning to normal activities.    PATHOLOGY:       PHYSICAL EXAM:  ABD: Incisions are healing well without any erythema or signs of infection. Abdomen is soft and non distended.      ASSESSMENT:  Diagnoses and all orders for this visit:    1. S/P laparoscopic cholecystectomy (Primary)        PLAN:  1.The patient will follow-up on a prn basis unless there are any problems.  2. May shower.   3. May return to normal activity without restrictions.                      This document has been electronically signed by ELISABETH Arteaga on January 11, 2023 09:59 CST

## 2023-02-02 ENCOUNTER — OFFICE VISIT (OUTPATIENT)
Dept: CARDIOLOGY | Facility: CLINIC | Age: 83
End: 2023-02-02
Payer: MEDICARE

## 2023-02-02 VITALS
BODY MASS INDEX: 26.4 KG/M2 | WEIGHT: 149 LBS | OXYGEN SATURATION: 98 % | HEIGHT: 63 IN | DIASTOLIC BLOOD PRESSURE: 70 MMHG | SYSTOLIC BLOOD PRESSURE: 136 MMHG | HEART RATE: 68 BPM

## 2023-02-02 DIAGNOSIS — I10 ESSENTIAL HYPERTENSION: ICD-10-CM

## 2023-02-02 DIAGNOSIS — I47.1 PSVT (PAROXYSMAL SUPRAVENTRICULAR TACHYCARDIA): Primary | ICD-10-CM

## 2023-02-02 PROCEDURE — 99214 OFFICE O/P EST MOD 30 MIN: CPT | Performed by: NURSE PRACTITIONER

## 2023-02-02 NOTE — PATIENT INSTRUCTIONS
Record B/P at least 3 times a week    Show PCP at next visit, At next office visit if B/P log showing more B/P in the 120 range, we may need to decrease Cardizem CD to 120 mg daily.

## 2023-02-02 NOTE — PROGRESS NOTES
PSVT (paroxysmal supraventricular tachycardia) (HCC)      History of Present Illness  Ms. Lyndsay Chicas/Octaviano is a 82-year-old female with medical history of hypertension, GERD, glaucoma, and esophageal stricture requiring dilatation.    She was recently seen at [facility] ER for tachycardia, EKG showed supraventricular tachycardia at rate of 167 bpm.  While in the ER they were going to perform a chemical cardioversion however she spontaneously converted to normal sinus rhythm.  Labs showed that the following: sodium 134, Chloride 94, CR 1.18, eGFR 45.6, TSH wnl, CBC unremarkable  She was sent home on magnesium oxide 1 tablet daily and with recommendation to follow up with Cardiology.    11/11/22, her daughter is present with her.     Symptoms occurred after she voted.  She felt her heart beating fast and she went home to check her blood pressure and it was high as well as her heart rate was high. Associated symptoms are shortness of air.  She experiences shortness of air with activity and while sitting.  Denies chest pain, nausea or diaphoresis.  She has experienced occasional palpitations and choking episodes before this event..  She also has esophageal stricture that is dilated on occasion.  Reports that she has had a heart murmur for a long time.  Denies previous cardiovascular problems..    12/9/22 she presents with daughter for follow up.She wore a Holter monitor for 14 days. Non-sustained atrial tachycardia noted with maximum of 150 bpm. Normal burden of premature supraventricular beats. One triggered events with no symptoms reported. Reports upper back pain, no chest pain, or SOA. She still has fluttering in her chest.    Today, she presents for follow-up PSVT.  She recently went to the emergency room on 1220 with complaints of chest pain and elevated blood pressure and not feeling well.  She had myocardial nuclear stress test that was deemed low risk for ischemic disease.  Echocardiogram showing moderate  dilation of left atrium, preserved LVEF and normal diastolic function.  While in the hospital she was found to have gallbladder sludge and possible mass.  She has since seen Dr. Espino and had cholecystectomy.  No mass was found.    She has been feeling fatigue and checked her blood pressure and noticed in the 120s she does not feel well.  Describes that when she sits down she can immediately fall asleep.  Denies dizziness, diaphoresis, syncope, or nausea.    The ASCVD Risk score ([place] DK, et al., 2019) failed to calculate for the following reasons:    The 2019 ASCVD risk score is only valid for ages 40 to 79    Cardiac Risk Factors:  hypertension and Sedentary life style    Past Medical History:   Diagnosis Date   • Allergic rhinitis    • Artificial lens present     Artificial lens in position - left      • Benign hypertension    • Breast cancer (HCC)    • CKD (chronic kidney disease)    • Corns and callus    • Dry cough     most likely due to ACE inhibitor     • Elevated cholesterol    • Essential hypertension    • Gallbladder mass    • GERD (gastroesophageal reflux disease)    • Glaucoma, both eyes    • Hammer toe    • Heart murmur    • Hiatal hernia    • History of Papanicolaou smear of cervix 01/19/2004    Atrophy present.   • [place] (hard of hearing)    • Kidney disease    • Malabsorption of glucose    • Nondisplaced fracture of right radial styloid process, initial encounter for closed fracture    • Nuclear senile cataract of right eye    • Nuclear senile cataract of right eye    • Obesity    • Pain in right hand    • PONV (postoperative nausea and vomiting)    • Primary open angle glaucoma     controlled left right IOP increased but OCT stable     • Wears glasses      Past Surgical History:   Procedure Laterality Date   • BREAST SURGERY Right 11/22/1974    Cystic disease, right breast. Excision of lesion of right breast.   • CHOLECYSTECTOMY WITH INTRAOPERATIVE CHOLANGIOGRAM N/A 1/4/2023    Procedure:  LAPAROSCOPIC POSSIBLE OPEN CHOLECYSTECTOMY WITH INTRAOPERATIVE CHOLANGIOGRAM          (c-arm#1);  Surgeon: Unruly Espino MD;  Location: [facility] OR;  Service: General;  Laterality: N/A;   • ENDOSCOPY N/A 04/13/2022    Procedure: ESOPHAGOGASTRODUODENOSCOPY;  Surgeon: Luis Manuel Howard MD;  Location: [facility] ENDOSCOPY;  Service: Gastroenterology;  Laterality: N/A;   • ENDOSCOPY N/A 05/03/2022    Procedure: ESOPHAGOGASTRODUODENOSCOPY;  Surgeon: Luis Manuel Howard MD;  Location: [facility] ENDOSCOPY;  Service: Gastroenterology;  Laterality: N/A;   • ENDOSCOPY N/A 9/20/2022    Procedure: ESOPHAGOGASTRODUODENOSCOPY;  Surgeon: Luis Manuel Howard MD;  Location: [facility] ENDOSCOPY;  Service: Gastroenterology;  Laterality: N/A;   • ENDOSCOPY AND COLONOSCOPY  02/11/2008    A few diverticula in the sigmoid.   • ESOPHAGOSCOPY / EGD  03/10/2008    Grade II esophagitis of the distal esophagus. Multiple biopsies were obtained from the distal esophagus. The stomach appeared normal. The duodenum appeared normal   • EXTERNAL EAR SURGERY Left 04/25/2001    Full thickness wedge excision of the left ear, with layered closure   • EYE SURGERY Bilateral 02/19/2013    Remove cataract, insert lens (1)    left eye    • MASTECTOMY Bilateral 01/10/1991    Total mastectomy with level I and II node dissection 2   • TRABECULECTOMY Bilateral     Glaucoma     Social History     Socioeconomic History   • Marital status:    Tobacco Use   • Smoking status: Never   • Smokeless tobacco: Never   Vaping Use   • Vaping Use: Never used   Substance and Sexual Activity   • Alcohol use: Never   • Drug use: Never   • Sexual activity: Defer     Family History   Problem Relation Age of Onset   • Cancer Other    • Diabetes Other    • Heart disease Other    • Hypertension Other        ALLERGIES:  Allergies   Allergen Reactions   • Sulfa Antibiotics Itching       Review of Systems   Constitutional: Positive for malaise/fatigue. Negative for diaphoresis and night sweats.    Eyes: Negative.    Cardiovascular: Negative for chest pain, dyspnea on exertion, irregular heartbeat, leg swelling, near-syncope, palpitations and syncope.   Respiratory: Negative for cough and shortness of breath.    Endocrine: Negative for polydipsia, polyphagia and polyuria.   Hematologic/Lymphatic: Negative for bleeding problem.   Musculoskeletal: Negative for falls, muscle weakness and myalgias.   Gastrointestinal: Negative for heartburn, melena and nausea.   Neurological: Negative for dizziness, headaches and light-headedness.   Psychiatric/Behavioral: Negative.    Allergic/Immunologic: Negative.      Current Outpatient Medications   Medication Sig Dispense Refill   • atorvastatin (LIPITOR) 40 MG tablet Take 40 mg by mouth Every Night.     • Calcium-Magnesium-Vitamin D (CALCIUM 500 PO) Take 1 tablet by mouth 2 (Two) Times a Day.     • dilTIAZem CD (CARDIZEM CD) 180 MG 24 hr capsule Take 1 capsule by mouth Daily. 30 capsule 2   • dorzolamide-timolol (COSOPT) 22.3-6.8 MG/ML ophthalmic solution Administer 1 drop to the right eye 2 (Two) Times a Day.  6   • HM ASPIRIN EC LOW DOSE PO Take 81 mg by mouth Daily.     • latanoprost (XALATAN) 0.005 % ophthalmic solution Administer 1 drop to both eyes Every Night.     • losartan (COZAAR) 50 MG tablet Take 1 tablet by mouth Daily. 30 tablet 2   • metoprolol tartrate (LOPRESSOR) 25 MG tablet Take 1 tablet by mouth Every 12 (Twelve) Hours. 60 tablet 2   • Multiple Vitamins-Minerals (MULTIVITAMIN WITH MINERALS) tablet tablet Take 1 tablet by mouth Daily.     • Netarsudil Dimesylate (Rhopressa) 0.02 % solution Administer 1 drop to the right eye Every Night.     • omeprazole (priLOSEC) 20 MG capsule Take 1 capsule by mouth Daily. 90 capsule 2   • polycarbophil (FIBERCON) 625 MG tablet Take 625 mg by mouth Daily.       No current facility-administered medications for this visit.     OBJECTIVE:    Physical Exam:   Vitals reviewed.   Constitutional:       Appearance: Normal and  "healthy appearance. Well-developed, well-groomed, normal weight and not in distress.   Eyes:      General: Lids are normal.      Conjunctiva/sclera: Conjunctivae normal.      Right eye: Right conjunctiva is not injected.      Left eye: Left conjunctiva is not injected.   HENT:      Head: Normocephalic and atraumatic.      Right Ear: External ear normal.      Left Ear: External ear normal.   Pulmonary:      Effort: Pulmonary effort is normal. No prolonged expiration or respiratory distress.      Breath sounds: Normal breath sounds and air entry.   Cardiovascular:      Normal rate. Regular rhythm. Normal S1 with normal intensity. Normal S2 with normal intensity.      Murmurs: There is no murmur.      No gallop.   Skin:     General: Skin is warm and dry.      Capillary Refill: Capillary refill takes less than 2 seconds.      Coloration: Skin is not jaundiced or pale.   Neurological:      General: No focal deficit present.      Mental Status: Alert, oriented to person, place, and time and oriented to person, place and time.   Psychiatric:         Attention and Perception: Attention normal.         Mood and Affect: Mood normal.         Speech: Speech normal.       Vitals:    02/02/23 1120   BP: 136/70   Pulse: 68   SpO2: 98%   Weight: 67.6 kg (149 lb)   Height: 160 cm (63\")       DATA REVIEWED:   Results for orders placed during the hospital encounter of 12/20/22    Adult Transthoracic Echo Complete w/ Color, Spectral and Contrast if necessary per protocol    Interpretation Summary  •  Left ventricular systolic function is normal. Left ventricular ejection fraction appears to be 66 - 70%.  •  Left ventricular diastolic function was normal.  •  Left atrial volume is mildly increased.  •  Estimated right ventricular systolic pressure from tricuspid regurgitation is moderately elevated (45-55 mmHg).  •  Mild pulmonary hypertension is present.      No radiology results for the last 30 days.  CXR:     CT:     Labs: BMP, CBC, " LIPID, TSH  Lab Results   Component Value Date    GLUCOSE 105 (H) 12/22/2022    CALCIUM 8.8 12/22/2022     (L) 12/22/2022    K 3.7 12/22/2022    CO2 23.0 12/22/2022     12/22/2022    BUN 14 12/22/2022    CREATININE 0.86 12/22/2022    EGFRIFNONA 57 (L) 01/31/2022    BCR 16.3 12/22/2022    ANIONGAP 9.0 12/22/2022     Lab Results   Component Value Date    WBC 5.59 12/22/2022    HGB 12.0 12/22/2022    HCT 37.4 12/22/2022    MCV 98.7 (H) 12/22/2022     12/22/2022     Lab Results   Component Value Date    CHOL 118 09/16/2021    CHOL 140 09/12/2018    CHOL 210 (H) 11/14/2017     Lab Results   Component Value Date    TRIG 86 09/16/2021    TRIG 70 09/12/2018    TRIG 57 08/05/2018     Lab Results   Component Value Date    HDL 67 (H) 09/16/2021    HDL 66 09/12/2018    HDL 73 08/05/2018     No components found for: LDLCALC  Lab Results   Component Value Date    LDL 34 09/16/2021    LDL 45 09/12/2018     08/05/2018     No results found for: HDLLDLRATIO  No components found for: CHOLHDL  Lab Results   Component Value Date    TSH 2.850 11/04/2022     Lab Results   Component Value Date    PROBNP 307.8 11/04/2022     EKG:           The following portions of the patient's history were reviewed and updated as appropriate: allergies, current medications, past family history, past medical history, past social history, past surgical history and problem list.  Old records reviewed and pertinent information is included in the above objective data.     ASSESSMENT/PLAN:     Diagnosis Plan   1. PSVT (paroxysmal supraventricular tachycardia) (HCC)        2. Essential hypertension          1.  Paroxysmal supraventricular tachycardia.  Known PSVT on prior EKG. She worse holter monitor for 14 days  1 episode of atrial tachycardia noted (20 beats) then converted to sinus Asymptomatic.   1 triggered event, correlated with sinus tachycardia  93.8% NSR and 6.13 % Sinus tach.     Recent negative myocardial nuclear stress  test. Echo showing preserved LVEF at 5%, normal diastolic function    No reoccurrence of PSVT since taking Metoprolol 25 mg twice daily and Cardizem CD    2. Essential Hypertension. Normotensive today. Fatigue at times when systolic 120's    Continue losartan 50 mg daily.  Cardizem CD was increased from 120 mg to 180 mg while in the hospital.  Instructed her to keep B/P, with at least 3 B/P checks weekly.  If, systolic staying around 120's, would recommend decrease back to 120 mg daily.     I spent 30 minutes caring for Lyndsay on this date of service. This time includes time spent by me in the following activities: reviewing tests, obtaining and/or reviewing a separately obtained history, performing a medically appropriate examination and/or evaluation, counseling and educating the patient/family/caregiver, ordering medications, tests, or procedures and documenting information in the medical record  Return in about 3 months (around 5/2/2023) for Recheck.        This document has been electronically signed by ELISABETH Das on December 9, 2022 09:33 CST   Electronically signed by ELISABETH Das, 11/11/22, 9:33 AM CST.

## 2023-02-14 RX ORDER — ATORVASTATIN CALCIUM 40 MG/1
TABLET, FILM COATED ORAL
Qty: 90 TABLET | Refills: 1 | Status: SHIPPED | OUTPATIENT
Start: 2023-02-14

## 2023-02-15 ENCOUNTER — LAB (OUTPATIENT)
Dept: LAB | Facility: HOSPITAL | Age: 83
End: 2023-02-15
Payer: MEDICARE

## 2023-02-15 ENCOUNTER — TRANSCRIBE ORDERS (OUTPATIENT)
Dept: LAB | Facility: HOSPITAL | Age: 83
End: 2023-02-15
Payer: MEDICARE

## 2023-02-15 DIAGNOSIS — N18.30 STAGE 3 CHRONIC KIDNEY DISEASE, UNSPECIFIED WHETHER STAGE 3A OR 3B CKD: Primary | ICD-10-CM

## 2023-02-15 DIAGNOSIS — N18.30 STAGE 3 CHRONIC KIDNEY DISEASE, UNSPECIFIED WHETHER STAGE 3A OR 3B CKD: ICD-10-CM

## 2023-02-15 LAB
ALBUMIN SERPL-MCNC: 4.1 G/DL (ref 3.5–5.2)
ANION GAP SERPL CALCULATED.3IONS-SCNC: 12.1 MMOL/L (ref 5–15)
BASOPHILS # BLD AUTO: 0.09 10*3/MM3 (ref 0–0.2)
BASOPHILS NFR BLD AUTO: 1.4 % (ref 0–1.5)
BUN SERPL-MCNC: 21 MG/DL (ref 8–23)
BUN/CREAT SERPL: 20.2 (ref 7–25)
CALCIUM SPEC-SCNC: 9.2 MG/DL (ref 8.6–10.5)
CHLORIDE SERPL-SCNC: 99 MMOL/L (ref 98–107)
CO2 SERPL-SCNC: 24.9 MMOL/L (ref 22–29)
CREAT SERPL-MCNC: 1.04 MG/DL (ref 0.57–1)
DEPRECATED RDW RBC AUTO: 42.9 FL (ref 37–54)
EGFRCR SERPLBLD CKD-EPI 2021: 53.1 ML/MIN/1.73
EOSINOPHIL # BLD AUTO: 0.35 10*3/MM3 (ref 0–0.4)
EOSINOPHIL NFR BLD AUTO: 5.5 % (ref 0.3–6.2)
ERYTHROCYTE [DISTWIDTH] IN BLOOD BY AUTOMATED COUNT: 12.2 % (ref 12.3–15.4)
GLUCOSE SERPL-MCNC: 110 MG/DL (ref 65–99)
HCT VFR BLD AUTO: 36.7 % (ref 34–46.6)
HGB BLD-MCNC: 12.5 G/DL (ref 12–15.9)
IMM GRANULOCYTES # BLD AUTO: 0.02 10*3/MM3 (ref 0–0.05)
IMM GRANULOCYTES NFR BLD AUTO: 0.3 % (ref 0–0.5)
LYMPHOCYTES # BLD AUTO: 1.91 10*3/MM3 (ref 0.7–3.1)
LYMPHOCYTES NFR BLD AUTO: 30 % (ref 19.6–45.3)
MAGNESIUM SERPL-MCNC: 2 MG/DL (ref 1.6–2.4)
MCH RBC QN AUTO: 32.9 PG (ref 26.6–33)
MCHC RBC AUTO-ENTMCNC: 34.1 G/DL (ref 31.5–35.7)
MCV RBC AUTO: 96.6 FL (ref 79–97)
MONOCYTES # BLD AUTO: 0.78 10*3/MM3 (ref 0.1–0.9)
MONOCYTES NFR BLD AUTO: 12.3 % (ref 5–12)
NEUTROPHILS NFR BLD AUTO: 3.21 10*3/MM3 (ref 1.7–7)
NEUTROPHILS NFR BLD AUTO: 50.5 % (ref 42.7–76)
NRBC BLD AUTO-RTO: 0 /100 WBC (ref 0–0.2)
PHOSPHATE SERPL-MCNC: 3.2 MG/DL (ref 2.5–4.5)
PLATELET # BLD AUTO: 230 10*3/MM3 (ref 140–450)
PMV BLD AUTO: 11 FL (ref 6–12)
POTASSIUM SERPL-SCNC: 4.4 MMOL/L (ref 3.5–5.2)
RBC # BLD AUTO: 3.8 10*6/MM3 (ref 3.77–5.28)
SODIUM SERPL-SCNC: 136 MMOL/L (ref 136–145)
WBC NRBC COR # BLD: 6.36 10*3/MM3 (ref 3.4–10.8)

## 2023-02-15 PROCEDURE — 82570 ASSAY OF URINE CREATININE: CPT

## 2023-02-15 PROCEDURE — 85025 COMPLETE CBC W/AUTO DIFF WBC: CPT

## 2023-02-15 PROCEDURE — 83735 ASSAY OF MAGNESIUM: CPT

## 2023-02-15 PROCEDURE — 36415 COLL VENOUS BLD VENIPUNCTURE: CPT

## 2023-02-15 PROCEDURE — 80069 RENAL FUNCTION PANEL: CPT

## 2023-02-15 PROCEDURE — 84156 ASSAY OF PROTEIN URINE: CPT

## 2023-02-16 LAB
CREAT UR-MCNC: 41.3 MG/DL
PROT ?TM UR-MCNC: 14.4 MG/DL
PROT/CREAT UR: 348.7 MG/G CREA (ref 0–200)

## 2023-02-20 RX ORDER — DILTIAZEM HYDROCHLORIDE 180 MG/1
CAPSULE, COATED, EXTENDED RELEASE ORAL
Qty: 30 CAPSULE | Refills: 1 | Status: SHIPPED | OUTPATIENT
Start: 2023-02-20 | End: 2023-03-02 | Stop reason: SDUPTHER

## 2023-02-24 ENCOUNTER — LAB (OUTPATIENT)
Dept: LAB | Facility: HOSPITAL | Age: 83
End: 2023-02-24
Payer: MEDICARE

## 2023-02-24 DIAGNOSIS — R73.9 HYPERGLYCEMIA: Chronic | ICD-10-CM

## 2023-02-24 DIAGNOSIS — E78.49 OTHER HYPERLIPIDEMIA: Chronic | ICD-10-CM

## 2023-02-24 LAB
CHOLEST SERPL-MCNC: 140 MG/DL (ref 0–200)
HBA1C MFR BLD: 6.5 % (ref 4.8–5.6)
HDLC SERPL-MCNC: 69 MG/DL (ref 40–60)
LDLC SERPL CALC-MCNC: 59 MG/DL (ref 0–100)
LDLC/HDLC SERPL: 0.86 {RATIO}
TRIGL SERPL-MCNC: 58 MG/DL (ref 0–150)
VLDLC SERPL-MCNC: 12 MG/DL (ref 5–40)

## 2023-02-24 PROCEDURE — 36415 COLL VENOUS BLD VENIPUNCTURE: CPT

## 2023-02-24 PROCEDURE — 83036 HEMOGLOBIN GLYCOSYLATED A1C: CPT

## 2023-02-24 PROCEDURE — 80061 LIPID PANEL: CPT

## 2023-03-02 ENCOUNTER — OFFICE VISIT (OUTPATIENT)
Dept: FAMILY MEDICINE CLINIC | Facility: CLINIC | Age: 83
End: 2023-03-02
Payer: MEDICARE

## 2023-03-02 VITALS
WEIGHT: 149.3 LBS | BODY MASS INDEX: 26.45 KG/M2 | HEIGHT: 63 IN | HEART RATE: 68 BPM | SYSTOLIC BLOOD PRESSURE: 132 MMHG | DIASTOLIC BLOOD PRESSURE: 68 MMHG

## 2023-03-02 DIAGNOSIS — I47.1 PSVT (PAROXYSMAL SUPRAVENTRICULAR TACHYCARDIA): Chronic | ICD-10-CM

## 2023-03-02 DIAGNOSIS — R63.0 DECREASED APPETITE: Primary | ICD-10-CM

## 2023-03-02 DIAGNOSIS — E78.49 OTHER HYPERLIPIDEMIA: Chronic | ICD-10-CM

## 2023-03-02 DIAGNOSIS — R73.9 HYPERGLYCEMIA: Chronic | ICD-10-CM

## 2023-03-02 DIAGNOSIS — Z00.00 MEDICARE ANNUAL WELLNESS VISIT, SUBSEQUENT: ICD-10-CM

## 2023-03-02 DIAGNOSIS — I10 ESSENTIAL HYPERTENSION: Chronic | ICD-10-CM

## 2023-03-02 PROBLEM — R07.9 CHEST PAIN, UNSPECIFIED TYPE: Status: RESOLVED | Noted: 2022-12-20 | Resolved: 2023-03-02

## 2023-03-02 PROCEDURE — 1170F FXNL STATUS ASSESSED: CPT | Performed by: FAMILY MEDICINE

## 2023-03-02 PROCEDURE — 1159F MED LIST DOCD IN RCRD: CPT | Performed by: FAMILY MEDICINE

## 2023-03-02 PROCEDURE — 96160 PT-FOCUSED HLTH RISK ASSMT: CPT | Performed by: FAMILY MEDICINE

## 2023-03-02 PROCEDURE — 99214 OFFICE O/P EST MOD 30 MIN: CPT | Performed by: FAMILY MEDICINE

## 2023-03-02 PROCEDURE — G0439 PPPS, SUBSEQ VISIT: HCPCS | Performed by: FAMILY MEDICINE

## 2023-03-02 RX ORDER — DILTIAZEM HYDROCHLORIDE 180 MG/1
180 CAPSULE, COATED, EXTENDED RELEASE ORAL DAILY
Qty: 90 CAPSULE | Refills: 3 | Status: SHIPPED | OUTPATIENT
Start: 2023-03-02

## 2023-03-02 NOTE — PROGRESS NOTES
Subjective   Lyndsay Brumfield is a 82 y.o. female.  Follow-up for hypertension mild hyperlipidemia new onset PSVT recent history of cholecystectomy.  Antrums had an eventful 6 months.  Had PSVT also had the abdominal pain chest wall pain found to have a gallbladder lesion and have a cholecystectomy.  States feeling some better after procedures.  Had diltiazem added to metoprolol which is helped SVT.  States appetite is not back to where she would like it but is trying to eat a little better.  Has maintain most of her weight.  Remains very active.  Lab work revealed A1c holding steady at 6.5 without any medication liver lipid panel within normal limits.  Chart reviewed    History of Present Illness   HPI    The following portions of the patient's history were reviewed and updated as appropriate: allergies, current medications, past family history, past medical history, past social history, past surgical history and problem list.    Review of Systems  Review of Systems   Constitutional: Positive for appetite change. Negative for activity change, fatigue and unexpected weight change.   HENT: Negative for trouble swallowing and voice change.    Eyes: Negative for redness and visual disturbance.   Respiratory: Negative for cough and wheezing.    Cardiovascular: Negative for chest pain and palpitations.   Gastrointestinal: Negative for abdominal pain, constipation, diarrhea, nausea and vomiting.   Genitourinary: Negative for urgency.   Musculoskeletal: Negative for joint swelling.   Neurological: Negative for syncope and headaches.   Hematological: Negative for adenopathy.   Psychiatric/Behavioral: Negative for sleep disturbance.       Objective   Physical Exam  Physical Exam  Constitutional:       Appearance: She is well-developed.   HENT:      Head: Normocephalic.   Eyes:      Pupils: Pupils are equal, round, and reactive to light.   Neck:      Thyroid: No thyromegaly.   Cardiovascular:      Rate and Rhythm:  "Normal rate and regular rhythm.      Heart sounds: Normal heart sounds. No murmur heard.    No friction rub. No gallop.   Pulmonary:      Breath sounds: Normal breath sounds.   Abdominal:      General: There is no distension.      Palpations: Abdomen is soft. There is no mass.      Tenderness: There is no abdominal tenderness.   Musculoskeletal:         General: Normal range of motion.      Cervical back: Normal range of motion.      Comments: No peripheral edema 1-2+ pulses get up and go 3 to 5 seconds gait normal   Skin:     General: Skin is warm and dry.   Neurological:      Mental Status: She is alert and oriented to person, place, and time.      Deep Tendon Reflexes: Reflexes are normal and symmetric.   Psychiatric:         Attention and Perception: Attention normal.         Mood and Affect: Mood normal.         Speech: Speech normal.         Behavior: Behavior normal.         Thought Content: Thought content normal.         Cognition and Memory: Cognition normal.           Visit Vitals  /68   Pulse 68   Ht 160 cm (63\")   Wt 67.7 kg (149 lb 4.8 oz)   LMP  (LMP Unknown) Comment: Postmenopausal   BMI 26.45 kg/m²     Body mass index is 26.45 kg/m².  /68   Pulse 68   Ht 160 cm (63\")   Wt 67.7 kg (149 lb 4.8 oz)   LMP  (LMP Unknown) Comment: Postmenopausal  BMI 26.45 kg/m²       Assessment/Plan   Diagnoses and all orders for this visit:    1. Decreased appetite (Primary)    2. Essential hypertension  -     dilTIAZem CD (CARDIZEM CD) 180 MG 24 hr capsule; Take 1 capsule by mouth Daily.  Dispense: 90 capsule; Refill: 3  -     metoprolol tartrate (LOPRESSOR) 25 MG tablet; Take 1 tablet by mouth Every 12 (Twelve) Hours.  Dispense: 180 tablet; Refill: 3    3. Other hyperlipidemia    4. Hyperglycemia    5. Medicare annual wellness visit, subsequent    6. PSVT (paroxysmal supraventricular tachycardia) (HCC)  -     dilTIAZem CD (CARDIZEM CD) 180 MG 24 hr capsule; Take 1 capsule by mouth Daily.  Dispense: 90 " capsule; Refill: 3  -     metoprolol tartrate (LOPRESSOR) 25 MG tablet; Take 1 tablet by mouth Every 12 (Twelve) Hours.  Dispense: 180 tablet; Refill: 3    Counseled on increasing hydration counseled on slow return of appetite counseled on good nutrition.  Continue weight control measures.  Counseled on infection prevention.  Continue medicines as outlined for now.  Recheck 6 months with A1c here for history of elevated sugar.

## 2023-03-02 NOTE — PROGRESS NOTES
The ABCs of the Annual Wellness Visit  Subsequent Medicare Wellness Visit    Subjective      Lyndsay Brumfield is a 82 y.o. female who presents for a Subsequent Medicare Wellness Visit.    The following portions of the patient's history were reviewed and   updated as appropriate: allergies, current medications, past family history, past medical history, past social history, past surgical history and problem list.    Compared to one year ago, the patient feels her physical   health is better.    Compared to one year ago, the patient feels her mental   health is the same.    Recent Hospitalizations:  This patient has had a [facility] admission record on file within the last 365 days.    Current Medical Providers:  Patient Care Team:  Lawson Garland MD as PCP - General (Family Medicine)    Outpatient Medications Prior to Visit   Medication Sig Dispense Refill   • atorvastatin (LIPITOR) 40 MG tablet TAKE 1 TABLET EVERY DAY 90 tablet 1   • Calcium-Magnesium-Vitamin D (CALCIUM 500 PO) Take 1 tablet by mouth 2 (Two) Times a Day.     • dorzolamide-timolol (COSOPT) 22.3-6.8 MG/ML ophthalmic solution Administer 1 drop to the right eye 2 (Two) Times a Day.  6   • HM ASPIRIN EC LOW DOSE PO Take 81 mg by mouth Daily.     • latanoprost (XALATAN) 0.005 % ophthalmic solution Administer 1 drop to both eyes Every Night.     • losartan (COZAAR) 50 MG tablet Take 1 tablet by mouth Daily. 30 tablet 2   • Multiple Vitamins-Minerals (MULTIVITAMIN WITH MINERALS) tablet tablet Take 1 tablet by mouth Daily.     • Netarsudil Dimesylate (Rhopressa) 0.02 % solution Administer 1 drop to the right eye Every Night.     • omeprazole (priLOSEC) 20 MG capsule Take 1 capsule by mouth Daily. 90 capsule 2   • polycarbophil (FIBERCON) 625 MG tablet Take 1 tablet by mouth Daily.     • dilTIAZem CD (CARDIZEM CD) 180 MG 24 hr capsule TAKE 1 CAPSULE BY MOUTH ONCE DAILY 30 capsule 1   • metoprolol tartrate (LOPRESSOR) 25 MG tablet TAKE 1 TABLET BY  "MOUTH EVERY 12 HOURS 60 tablet 1     No facility-administered medications prior to visit.       No opioid medication identified on active medication list. I have reviewed chart for other potential  high risk medication/s and harmful drug interactions in the elderly.          Aspirin is on active medication list. Aspirin use is not indicated based on review of current medical condition/s. Risk of harm outweighs potential benefits. Patient instructed to discontinue this medication.  .      Patient Active Problem List   Diagnosis   • Essential hypertension   • Primary open angle glaucoma   • Nuclear senile cataract of right eye   • Nuclear senile cataract of right eye   • Hammer toe   • Glaucoma, both eyes   • GERD (gastroesophageal reflux disease)   • Artificial lens present   • Allergic rhinitis   • Other hyperlipidemia   • Hyperglycemia   • Other dysphagia   • Esophageal stricture   • PSVT (paroxysmal supraventricular tachycardia) (HCC)     Advance Care Planning  Advance Directive is not on file.  ACP discussion was held with the patient during this visit. Patient does not have an advance directive, information provided.     Objective    Vitals:    03/02/23 1405   BP: 132/68   Pulse: 68   Weight: 67.7 kg (149 lb 4.8 oz)   Height: 160 cm (63\")     Estimated body mass index is 26.45 kg/m² as calculated from the following:    Height as of this encounter: 160 cm (63\").    Weight as of this encounter: 67.7 kg (149 lb 4.8 oz).    BMI is >= 25 and <30. (Overweight) The following options were offered after discussion;: exercise counseling/recommendations and nutrition counseling/recommendations      Does the patient have evidence of cognitive impairment?   No    Lab Results   Component Value Date    TRIG 58 02/24/2023    HDL 69 (H) 02/24/2023    LDL 59 02/24/2023    VLDL 12 02/24/2023    HGBA1C 6.50 (H) 02/24/2023          HEALTH RISK ASSESSMENT    Smoking Status:  Social History     Tobacco Use   Smoking Status Never "   Smokeless Tobacco Never     Alcohol Consumption:  Social History     Substance and Sexual Activity   Alcohol Use Never     Fall Risk Screen:    IVETHADI Fall Risk Assessment was completed, and patient is at LOW risk for falls.Assessment completed on:3/2/2023    Depression Screening:  PHQ-2/PHQ-9 Depression Screening 3/2/2023   Little Interest or Pleasure in Doing Things 0-->not at all   Feeling Down, Depressed or Hopeless 0-->not at all   PHQ-9: Brief Depression Severity Measure Score 0       Health Habits and Functional and Cognitive Screening:  Functional & Cognitive Status 3/2/2023   Do you have difficulty preparing food and eating? No   Do you have difficulty bathing yourself, getting dressed or grooming yourself? No   Do you have difficulty using the toilet? No   Do you have difficulty moving around from place to place? No   Do you have trouble with steps or getting out of a bed or a chair? No   Current Diet Well Balanced Diet   Dental Exam Up to date   Eye Exam Up to date   Exercise (times per week) Other   Current Exercises Include Other   Do you need help using the phone?  No   Are you deaf or do you have serious difficulty hearing?  No   Do you need help with transportation? No   Do you need help shopping? No   Do you need help preparing meals?  No   Do you need help with housework?  No   Do you need help with laundry? No   Do you need help taking your medications? No   Do you need help managing money? No   Do you ever drive or ride in a car without wearing a seat belt? No   Have you felt unusual stress, anger or loneliness in the last month? No   Who do you live with? Alone   If you need help, do you have trouble finding someone available to you? No   Have you been bothered in the last four weeks by sexual problems? No   Do you have difficulty concentrating, remembering or making decisions? No       Age-appropriate Screening Schedule:  Refer to the list below for future screening recommendations based on  patient's age, sex and/or medical conditions. Orders for these recommended tests are listed in the plan section. The patient has been provided with a written plan.    Health Maintenance   Topic Date Due   • COVID-19 Vaccine (5 - Booster) 12/27/2021   • ANNUAL WELLNESS VISIT  10/07/2022   • LIPID PANEL  02/24/2024   • INFLUENZA VACCINE  Completed   • Pneumococcal Vaccine 65+  Completed   • DXA SCAN  Discontinued   • TDAP/TD VACCINES  Discontinued   • ZOSTER VACCINE  Discontinued                CMS Preventative Services Quick Reference  Risk Factors Identified During Encounter:    Immunizations Discussed/Encouraged: Influenza and COVID19    The above risks/problems have been discussed with the patient.  Pertinent information has been shared with the patient in the After Visit Summary.    Diagnoses and all orders for this visit:    1. Essential hypertension (Primary)  -     dilTIAZem CD (CARDIZEM CD) 180 MG 24 hr capsule; Take 1 capsule by mouth Daily.  Dispense: 90 capsule; Refill: 3  -     metoprolol tartrate (LOPRESSOR) 25 MG tablet; Take 1 tablet by mouth Every 12 (Twelve) Hours.  Dispense: 180 tablet; Refill: 3    2. Other hyperlipidemia    3. Hyperglycemia    4. Medicare annual wellness visit, subsequent    5. PSVT (paroxysmal supraventricular tachycardia) (HCC)  -     dilTIAZem CD (CARDIZEM CD) 180 MG 24 hr capsule; Take 1 capsule by mouth Daily.  Dispense: 90 capsule; Refill: 3  -     metoprolol tartrate (LOPRESSOR) 25 MG tablet; Take 1 tablet by mouth Every 12 (Twelve) Hours.  Dispense: 180 tablet; Refill: 3        Follow Up:   Next Medicare Wellness visit to be scheduled in 1 year.      An After Visit Summary and PPPS were made available to the patient.

## 2023-05-08 ENCOUNTER — OFFICE VISIT (OUTPATIENT)
Dept: CARDIOLOGY | Facility: CLINIC | Age: 83
End: 2023-05-08
Payer: MEDICARE

## 2023-05-08 VITALS
BODY MASS INDEX: 26.61 KG/M2 | HEART RATE: 68 BPM | WEIGHT: 150.2 LBS | SYSTOLIC BLOOD PRESSURE: 128 MMHG | HEIGHT: 63 IN | DIASTOLIC BLOOD PRESSURE: 76 MMHG | OXYGEN SATURATION: 98 %

## 2023-05-08 DIAGNOSIS — I10 ESSENTIAL HYPERTENSION: Chronic | ICD-10-CM

## 2023-05-08 DIAGNOSIS — I47.1 PSVT (PAROXYSMAL SUPRAVENTRICULAR TACHYCARDIA): Primary | ICD-10-CM

## 2023-05-08 RX ORDER — LOSARTAN POTASSIUM 25 MG/1
25 TABLET ORAL DAILY
Qty: 90 TABLET | Refills: 3 | Status: SHIPPED | OUTPATIENT
Start: 2023-05-08

## 2023-05-08 NOTE — PROGRESS NOTES
PSVT (paroxysmal supraventricular tachycardia)      History of Present Illness  Ms. Lyndsay Chicas/Octaviano is a 82-year-old female with medical history of hypertension, GERD, glaucoma, and esophageal stricture requiring dilatation.    She was recently seen at [facility] ER for tachycardia, EKG showed supraventricular tachycardia at rate of 167 bpm.  While in the ER they were going to perform a chemical cardioversion however she spontaneously converted to normal sinus rhythm.  Labs showed that the following: sodium 134, Chloride 94, CR 1.18, eGFR 45.6, TSH wnl, CBC unremarkable  She was sent home on magnesium oxide 1 tablet daily and with recommendation to follow up with Cardiology.    11/11/22, her daughter is present with her.     Symptoms occurred after she voted.  She felt her heart beating fast and she went home to check her blood pressure and it was high as well as her heart rate was high. Associated symptoms are shortness of air.  She experiences shortness of air with activity and while sitting.  Denies chest pain, nausea or diaphoresis.  She has experienced occasional palpitations and choking episodes before this event..  She also has esophageal stricture that is dilated on occasion.  Reports that she has had a heart murmur for a long time.  Denies previous cardiovascular problems..    12/9/22 she presents with daughter for follow up.She wore a Holter monitor for 14 days. Non-sustained atrial tachycardia noted with maximum of 150 bpm. Normal burden of premature supraventricular beats. One triggered events with no symptoms reported. Reports upper back pain, no chest pain, or SOA. She still has fluttering in her chest.    2/2/23, she presents for follow-up PSVT.  She recently went to the emergency room on 1220 with complaints of chest pain and elevated blood pressure and not feeling well.  She had myocardial nuclear stress test that was deemed low risk for ischemic disease.  Echocardiogram showing moderate  dilation of left atrium, preserved LVEF and normal diastolic function.  While in the hospital she was found to have gallbladder sludge and possible mass.  She has since seen Dr. Espino and had cholecystectomy.  No mass was found.    She has been feeling fatigue and checked her blood pressure and noticed in the 120s she does not feel well.  Describes that when she sits down she can immediately fall asleep.  Denies dizziness, diaphoresis, syncope, or nausea.    Today, she presents for follow-up for PSVT.  She complains of fatigue and feeling sleepy.  Having occasional flutter.  She reports fluttering  has decreased.  Denying dizziness, syncope, or nausea.  Has glaucoma and recently had right eye procedure.  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    The ASCVD Risk score ([place] DK, et al., 2019) failed to calculate for the following reasons:    The 2019 ASCVD risk score is only valid for ages 40 to 79    Cardiac Risk Factors:  hypertension and Sedentary life style    Past Medical History:   Diagnosis Date   • Allergic rhinitis    • Artificial lens present     Artificial lens in position - left      • Benign hypertension    • Breast cancer    • CKD (chronic kidney disease)    • Corns and callus    • Dry cough     most likely due to ACE inhibitor     • Elevated cholesterol    • Essential hypertension    • Gallbladder mass    • GERD (gastroesophageal reflux disease)    • Glaucoma, both eyes    • Hammer toe    • Heart murmur    • Hiatal hernia    • History of Papanicolaou smear of cervix 01/19/2004    Atrophy present.   • Unga (hard of hearing)    • Kidney disease    • Malabsorption of glucose    • Nondisplaced fracture of right radial styloid process, initial encounter for closed fracture    • Nuclear senile cataract of right eye    • Nuclear senile cataract of right eye    • Obesity    • Pain in right hand    • PONV (postoperative nausea and vomiting)    • Primary open angle glaucoma     controlled  left right IOP increased but OCT stable     • Wears glasses      Past Surgical History:   Procedure Laterality Date   • BREAST SURGERY Right 11/22/1974    Cystic disease, right breast. Excision of lesion of right breast.   • CHOLECYSTECTOMY WITH INTRAOPERATIVE CHOLANGIOGRAM N/A 01/04/2023    Procedure: LAPAROSCOPIC POSSIBLE OPEN CHOLECYSTECTOMY WITH INTRAOPERATIVE CHOLANGIOGRAM          (c-arm#1);  Surgeon: Unruly Espino MD;  Location: [facility] OR;  Service: General;  Laterality: N/A;   • ENDOSCOPY N/A 04/13/2022    Procedure: ESOPHAGOGASTRODUODENOSCOPY;  Surgeon: Luis Manuel Howard MD;  Location: [facility] ENDOSCOPY;  Service: Gastroenterology;  Laterality: N/A;   • ENDOSCOPY N/A 05/03/2022    Procedure: ESOPHAGOGASTRODUODENOSCOPY;  Surgeon: Luis Manuel Howard MD;  Location: [facility] ENDOSCOPY;  Service: Gastroenterology;  Laterality: N/A;   • ENDOSCOPY N/A 09/20/2022    Procedure: ESOPHAGOGASTRODUODENOSCOPY;  Surgeon: Luis Manuel Howard MD;  Location: [facility] ENDOSCOPY;  Service: Gastroenterology;  Laterality: N/A;   • ENDOSCOPY AND COLONOSCOPY  02/11/2008    A few diverticula in the sigmoid.   • ESOPHAGOSCOPY / EGD  03/10/2008    Grade II esophagitis of the distal esophagus. Multiple biopsies were obtained from the distal esophagus. The stomach appeared normal. The duodenum appeared normal   • EXTERNAL EAR SURGERY Left 04/25/2001    Full thickness wedge excision of the left ear, with layered closure   • EYE SURGERY Bilateral 02/19/2013    Remove cataract, insert lens (1)    left eye    • GALLBLADDER SURGERY     • MASTECTOMY Bilateral 01/10/1991    Total mastectomy with level I and II node dissection 2   • TRABECULECTOMY Bilateral     Glaucoma     Social History     Socioeconomic History   • Marital status:    Tobacco Use   • Smoking status: Never   • Smokeless tobacco: Never   Vaping Use   • Vaping Use: Never used   Substance and Sexual Activity   • Alcohol use: Never   • Drug use: Never   • Sexual activity: Defer      Family History   Problem Relation Age of Onset   • Cancer Other    • Diabetes Other    • Heart disease Other    • Hypertension Other        ALLERGIES:  Allergies   Allergen Reactions   • Sulfa Antibiotics Itching       Review of Systems   Constitutional: Positive for malaise/fatigue. Negative for diaphoresis and night sweats.   Cardiovascular: Positive for palpitations (Occasional fluttering). Negative for chest pain, dyspnea on exertion, leg swelling, near-syncope and syncope.   Respiratory: Negative for cough and shortness of breath.    Endocrine: Negative for polydipsia, polyphagia and polyuria.   Hematologic/Lymphatic: Negative for bleeding problem. Does not bruise/bleed easily.   Musculoskeletal: Negative for falls, muscle cramps, muscle weakness and myalgias.   Gastrointestinal: Negative for bloating, abdominal pain and nausea.   Neurological: Negative for dizziness, light-headedness and loss of balance.   Allergic/Immunologic: Negative.      Current Outpatient Medications   Medication Sig Dispense Refill   • atorvastatin (LIPITOR) 40 MG tablet TAKE 1 TABLET EVERY DAY 90 tablet 1   • Calcium-Magnesium-Vitamin D (CALCIUM 500 PO) Take 1 tablet by mouth 2 (Two) Times a Day.     • dilTIAZem CD (CARDIZEM CD) 180 MG 24 hr capsule Take 1 capsule by mouth Daily. 90 capsule 3   • dorzolamide-timolol (COSOPT) 22.3-6.8 MG/ML ophthalmic solution Administer 1 drop to the right eye 2 (Two) Times a Day.  6   • HM ASPIRIN EC LOW DOSE PO Take 81 mg by mouth Daily.     • latanoprost (XALATAN) 0.005 % ophthalmic solution Administer 1 drop to both eyes Every Night.     • metoprolol tartrate (LOPRESSOR) 25 MG tablet Take 1 tablet by mouth Every 12 (Twelve) Hours. 180 tablet 3   • Multiple Vitamins-Minerals (MULTIVITAMIN WITH MINERALS) tablet tablet Take 1 tablet by mouth Daily.     • Netarsudil Dimesylate (Rhopressa) 0.02 % solution Administer 1 drop to the right eye Every Night.     • omeprazole (priLOSEC) 20 MG capsule Take  "1 capsule by mouth Daily. 90 capsule 2   • polycarbophil (FIBERCON) 625 MG tablet Take 1 tablet by mouth Daily.     • losartan (COZAAR) 25 MG tablet Take 1 tablet by mouth Daily. 90 tablet 3     No current facility-administered medications for this visit.     OBJECTIVE:    Physical Exam:   Vitals reviewed.   Constitutional:       Appearance: Normal and healthy appearance. Well-developed, well-groomed and not in distress.   Eyes:      General: Lids are normal.      Conjunctiva/sclera: Conjunctivae normal.      Right eye: Right conjunctiva is not injected.      Left eye: Left conjunctiva is not injected.   HENT:      Head: Normocephalic and atraumatic.      Right Ear: External ear normal.      Left Ear: External ear normal.   Pulmonary:      Effort: Pulmonary effort is normal. No prolonged expiration or respiratory distress.      Breath sounds: Normal breath sounds and air entry. No wheezing. No rhonchi.   Cardiovascular:      Normal rate. Regular rhythm. Normal S1 with normal intensity. Normal S2 with normal intensity.      Murmurs: There is no murmur.      No gallop.   Edema:     Peripheral edema absent.   Skin:     General: Skin is warm and dry.      Capillary Refill: Capillary refill takes less than 2 seconds.   Neurological:      General: No focal deficit present.      Mental Status: Alert, oriented to person, place, and time and oriented to person, place and time.      Gait: Gait is intact.   Psychiatric:         Attention and Perception: Attention normal.         Mood and Affect: Mood normal.         Speech: Speech normal.         Behavior: Behavior is cooperative.       Vitals:    05/08/23 1019   BP: 128/76   BP Location: Left arm   Patient Position: Sitting   Cuff Size: Adult   Pulse: 68   SpO2: 98%   Weight: 68.1 kg (150 lb 3.2 oz)   Height: 160 cm (63\")       DATA REVIEWED:   Results for orders placed during the hospital encounter of 12/20/22    Adult Transthoracic Echo Complete w/ Color, Spectral and " Contrast if necessary per protocol    Interpretation Summary  •  Left ventricular systolic function is normal. Left ventricular ejection fraction appears to be 66 - 70%.  •  Left ventricular diastolic function was normal.  •  Left atrial volume is mildly increased.  •  Estimated right ventricular systolic pressure from tricuspid regurgitation is moderately elevated (45-55 mmHg).  •  Mild pulmonary hypertension is present.      No radiology results for the last 30 days.  CXR:     CT:     Labs: BMP, CBC, LIPID, TSH  Lab Results   Component Value Date    GLUCOSE 110 (H) 02/15/2023    CALCIUM 9.2 02/15/2023     02/15/2023    K 4.4 02/15/2023    CO2 24.9 02/15/2023    CL 99 02/15/2023    BUN 21 02/15/2023    CREATININE 1.04 (H) 02/15/2023    EGFRIFNONA 57 (L) 01/31/2022    BCR 20.2 02/15/2023    ANIONGAP 12.1 02/15/2023     Lab Results   Component Value Date    WBC 6.36 02/15/2023    HGB 12.5 02/15/2023    HCT 36.7 02/15/2023    MCV 96.6 02/15/2023     02/15/2023     Lab Results   Component Value Date    CHOL 140 02/24/2023    CHOL 118 09/16/2021    CHOL 140 09/12/2018     Lab Results   Component Value Date    TRIG 58 02/24/2023    TRIG 86 09/16/2021    TRIG 70 09/12/2018     Lab Results   Component Value Date    HDL 69 (H) 02/24/2023    HDL 67 (H) 09/16/2021    HDL 66 09/12/2018     No components found for: LDLCALC  Lab Results   Component Value Date    LDL 59 02/24/2023    LDL 34 09/16/2021    LDL 45 09/12/2018     No results found for: HDLLDLRATIO  No components found for: CHOLHDL  Lab Results   Component Value Date    TSH 2.850 11/04/2022     Lab Results   Component Value Date    PROBNP 307.8 11/04/2022     EKG:           The following portions of the patient's history were reviewed and updated as appropriate: allergies, current medications, past family history, past medical history, past social history, past surgical history and problem list.  Old records reviewed and pertinent information is included  in the above objective data.     ASSESSMENT/PLAN:     Diagnosis Plan   1. PSVT (paroxysmal supraventricular tachycardia)        2. Essential hypertension  losartan (COZAAR) 25 MG tablet        1.  Paroxysmal supraventricular tachycardia.  Known PSVT on prior EKG. She worse holter monitor for 14 days  1 episode of atrial tachycardia noted (20 beats) then converted to sinus Asymptomatic.   1 triggered event, correlated with sinus tachycardia  93.8% NSR and 6.13 % Sinus tach.     Recent negative myocardial nuclear stress test. Echo showing preserved LVEF at 68%, normal diastolic function  No reoccurrence of PSVT since taking Metoprolol 25 mg twice daily and Cardizem CD.  Occasional fluttering.    Continue above medication.    2. Essential Hypertension. Normotensive today.  Ongoing fatigue and tiredness.  By afternoon she is ready for a nap.     Decrease losartan to 25 mg daily  Continue Cardizem  mg daily  Continue Lopressor 25 mg twice daily      I spent 20 minutes caring for Lyndsay on this date of service. This time includes time spent by me in the following activities: reviewing tests, obtaining and/or reviewing a separately obtained history, performing a medically appropriate examination and/or evaluation, counseling and educating the patient/family/caregiver, ordering medications, tests, or procedures and documenting information in the medical record  Return in about 3 months (around 8/8/2023) for Recheck.        This document has been electronically signed by ELISABETH Das on May 8, 2023 11:04 CDT   Electronically signed by ELISABETH aDs, 05/08/23, 11:04 AM CDT.

## 2023-08-14 ENCOUNTER — OFFICE VISIT (OUTPATIENT)
Dept: CARDIOLOGY | Facility: CLINIC | Age: 83
End: 2023-08-14
Payer: MEDICARE

## 2023-08-14 VITALS
BODY MASS INDEX: 26.35 KG/M2 | HEART RATE: 71 BPM | WEIGHT: 148.7 LBS | HEIGHT: 63 IN | SYSTOLIC BLOOD PRESSURE: 128 MMHG | OXYGEN SATURATION: 95 % | DIASTOLIC BLOOD PRESSURE: 72 MMHG

## 2023-08-14 DIAGNOSIS — I47.1 PSVT (PAROXYSMAL SUPRAVENTRICULAR TACHYCARDIA): Primary | ICD-10-CM

## 2023-08-14 DIAGNOSIS — R35.0 URINARY FREQUENCY: ICD-10-CM

## 2023-08-14 DIAGNOSIS — I10 ESSENTIAL HYPERTENSION: ICD-10-CM

## 2023-08-14 NOTE — PROGRESS NOTES
PSVT (paroxysmal supraventricular tachycardia) (Chief Complaint )      History of Present Illness  Ms. Lyndsay Chicas/Octaviano is a 82-year-old female with medical history of hypertension, GERD, glaucoma, and esophageal stricture requiring dilatation.    She was recently seen at [facility] ER for tachycardia, EKG showed supraventricular tachycardia at rate of 167 bpm.  While in the ER they were going to perform a chemical cardioversion however she spontaneously converted to normal sinus rhythm.  Labs showed that the following: sodium 134, Chloride 94, CR 1.18, eGFR 45.6, TSH wnl, CBC unremarkable  She was sent home on magnesium oxide 1 tablet daily and with recommendation to follow up with Cardiology.    11/11/22, her daughter is present with her.     Symptoms occurred after she voted.  She felt her heart beating fast and she went home to check her blood pressure and it was high as well as her heart rate was high. Associated symptoms are shortness of air.  She experiences shortness of air with activity and while sitting.  Denies chest pain, nausea or diaphoresis.  She has experienced occasional palpitations and choking episodes before this event..  She also has esophageal stricture that is dilated on occasion.  Reports that she has had a heart murmur for a long time.  Denies previous cardiovascular problems..    12/9/22 she presents with daughter for follow up.She wore a Holter monitor for 14 days. Non-sustained atrial tachycardia noted with maximum of 150 bpm. Normal burden of premature supraventricular beats. One triggered events with no symptoms reported. Reports upper back pain, no chest pain, or SOA. She still has fluttering in her chest.    2/2/23, she presents for follow-up PSVT.  She recently went to the emergency room on 1220 with complaints of chest pain and elevated blood pressure and not feeling well.  She had myocardial nuclear stress test that was deemed low risk for ischemic disease.  Echocardiogram  showing moderate dilation of left atrium, preserved LVEF and normal diastolic function.  While in the hospital she was found to have gallbladder sludge and possible mass.  She has since seen Dr. Espino and had cholecystectomy.  No mass was found.    She has been feeling fatigue and checked her blood pressure and noticed in the 120s she does not feel well.  Describes that when she sits down she can immediately fall asleep.  Denies dizziness, diaphoresis, syncope, or nausea.    5/8/23, she presents for follow-up for PSVT.  She complains of fatigue and feeling sleepy.  Having occasional flutter.  She reports fluttering  has decreased.  Denying dizziness, syncope, or nausea.  Has glaucoma and recently had right eye procedure.  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    Today, she presents for follow-up.  Still feeling fatigued and tired.  Exercising at home with stationary pedals.  Reporting some imbalanced gait at times and concerned about walking in her neighborhood.  Complains of leg weakness and also states that she may have a UTI.  She is having urinary frequency.  Otherwise doing well    The ASCVD Risk score (Bakari DK, et al., 2019) failed to calculate for the following reasons:    The 2019 ASCVD risk score is only valid for ages 40 to 79    Cardiac Risk Factors:  hypertension and Sedentary life style    Past Medical History:   Diagnosis Date    Allergic rhinitis     Artificial lens present     Artificial lens in position - left       Benign hypertension     Breast cancer     CKD (chronic kidney disease)     Corns and callus     Dry cough     most likely due to ACE inhibitor      Elevated cholesterol     Essential hypertension     Gallbladder mass     GERD (gastroesophageal reflux disease)     Glaucoma, both eyes     Hammer toe     Heart murmur     Hiatal hernia     History of Papanicolaou smear of cervix 01/19/2004    Atrophy present.    [place] (hard of hearing)     Kidney disease      Malabsorption of glucose     Nondisplaced fracture of right radial styloid process, initial encounter for closed fracture     Nuclear senile cataract of right eye     Nuclear senile cataract of right eye     Obesity     Pain in right hand     PONV (postoperative nausea and vomiting)     Primary open angle glaucoma     controlled left right IOP increased but OCT stable      Wears glasses      Past Surgical History:   Procedure Laterality Date    BREAST SURGERY Right 11/22/1974    Cystic disease, right breast. Excision of lesion of right breast.    CHOLECYSTECTOMY WITH INTRAOPERATIVE CHOLANGIOGRAM N/A 01/04/2023    Procedure: LAPAROSCOPIC POSSIBLE OPEN CHOLECYSTECTOMY WITH INTRAOPERATIVE CHOLANGIOGRAM          (c-arm#1);  Surgeon: Unruly Espino MD;  Location: [facility] OR;  Service: General;  Laterality: N/A;    ENDOSCOPY N/A 04/13/2022    Procedure: ESOPHAGOGASTRODUODENOSCOPY;  Surgeon: Luis Manuel Howard MD;  Location: [facility] ENDOSCOPY;  Service: Gastroenterology;  Laterality: N/A;    ENDOSCOPY N/A 05/03/2022    Procedure: ESOPHAGOGASTRODUODENOSCOPY;  Surgeon: Luis Manuel Howard MD;  Location: [facility] ENDOSCOPY;  Service: Gastroenterology;  Laterality: N/A;    ENDOSCOPY N/A 09/20/2022    Procedure: ESOPHAGOGASTRODUODENOSCOPY;  Surgeon: Luis Manuel Howard MD;  Location: [facility] ENDOSCOPY;  Service: Gastroenterology;  Laterality: N/A;    ENDOSCOPY AND COLONOSCOPY  02/11/2008    A few diverticula in the sigmoid.    ESOPHAGOSCOPY / EGD  03/10/2008    Grade II esophagitis of the distal esophagus. Multiple biopsies were obtained from the distal esophagus. The stomach appeared normal. The duodenum appeared normal    EXTERNAL EAR SURGERY Left 04/25/2001    Full thickness wedge excision of the left ear, with layered closure    EYE SURGERY Bilateral 02/19/2013    Remove cataract, insert lens (1)    left eye     GALLBLADDER SURGERY      MASTECTOMY Bilateral 01/10/1991    Total mastectomy with level I and II node dissection 2     TRABECULECTOMY Bilateral     Glaucoma     Social History     Socioeconomic History    Marital status:    Tobacco Use    Smoking status: Never    Smokeless tobacco: Never   Vaping Use    Vaping Use: Never used   Substance and Sexual Activity    Alcohol use: Never    Drug use: Never    Sexual activity: Defer     Family History   Problem Relation Age of Onset    Cancer Other     Diabetes Other     Heart disease Other     Hypertension Other        ALLERGIES:  Allergies   Allergen Reactions    Sulfa Antibiotics Itching       Review of Systems   Constitutional: Positive for malaise/fatigue. Negative for diaphoresis, fever and night sweats.   Cardiovascular:  Positive for palpitations (Occasional fluttering). Negative for chest pain, dyspnea on exertion, leg swelling, near-syncope and syncope.   Respiratory:  Negative for cough, shortness of breath and sleep disturbances due to breathing.    Musculoskeletal:  Negative for falls, muscle cramps, muscle weakness and myalgias.   Gastrointestinal:  Negative for bloating, abdominal pain and nausea.   Genitourinary:  Positive for frequency. Negative for dysuria and hematuria.   Neurological:  Positive for loss of balance. Negative for dizziness, headaches, light-headedness and weakness.   Allergic/Immunologic: Negative.    Current Outpatient Medications   Medication Sig Dispense Refill    atorvastatin (LIPITOR) 40 MG tablet TAKE 1 TABLET EVERY DAY 90 tablet 1    Calcium-Magnesium-Vitamin D (CALCIUM 500 PO) Take 1 tablet by mouth 2 (Two) Times a Day.      dilTIAZem CD (CARDIZEM CD) 180 MG 24 hr capsule Take 1 capsule by mouth Daily. 90 capsule 3    dorzolamide-timolol (COSOPT) 22.3-6.8 MG/ML ophthalmic solution Administer 1 drop to the right eye 2 (Two) Times a Day.  6    HM ASPIRIN EC LOW DOSE PO Take 81 mg by mouth Daily.      latanoprost (XALATAN) 0.005 % ophthalmic solution Administer 1 drop to both eyes Every Night.      losartan (COZAAR) 25 MG tablet Take 1 tablet by  mouth Daily. 90 tablet 3    metoprolol tartrate (LOPRESSOR) 25 MG tablet Take 1 tablet by mouth Every 12 (Twelve) Hours. 180 tablet 3    Multiple Vitamins-Minerals (MULTIVITAMIN WITH MINERALS) tablet tablet Take 1 tablet by mouth Daily.      Netarsudil Dimesylate (Rhopressa) 0.02 % solution Administer 1 drop to the right eye Every Night.      omeprazole (priLOSEC) 20 MG capsule Take 1 capsule by mouth Daily. 90 capsule 2    polycarbophil (FIBERCON) 625 MG tablet Take 1 tablet by mouth Daily.       No current facility-administered medications for this visit.     OBJECTIVE:    Physical Exam:   Vitals reviewed.   Constitutional:       Appearance: Normal and healthy appearance. Well-developed, well-groomed and not in distress.   Eyes:      General: Lids are normal.         Right eye: No discharge.         Left eye: No discharge.   HENT:      Head: Normocephalic.      Right Ear: External ear normal.      Left Ear: External ear normal.    Mouth/Throat:      Lips: Pink.      Mouth: Mucous membranes are moist.   Neck:      Vascular: JVD normal.   Pulmonary:      Effort: Pulmonary effort is normal. No prolonged expiration or respiratory distress.      Breath sounds: Normal breath sounds and air entry.   Cardiovascular:      Normal rate. Regular rhythm. Normal S1 with normal intensity. Normal S2 with normal intensity.       Murmurs: There is no murmur.      No gallop.    Edema:     Peripheral edema absent.   Skin:     General: Skin is warm and dry.      Capillary Refill: Capillary refill takes less than 2 seconds.   Neurological:      General: No focal deficit present.      Mental Status: Alert, oriented to person, place, and time and oriented to person, place and time.   Psychiatric:         Attention and Perception: Attention normal.         Mood and Affect: Mood normal.         Speech: Speech normal.         Behavior: Behavior is cooperative.     Vitals:    08/14/23 0857   BP: 128/72   BP Location: Left arm   Patient  "Position: Sitting   Cuff Size: Adult   Pulse: 71   SpO2: 95%   Weight: 67.4 kg (148 lb 11.2 oz)   Height: 160 cm (63\")       DATA REVIEWED:   Results for orders placed during the hospital encounter of 12/20/22    Adult Transthoracic Echo Complete w/ Color, Spectral and Contrast if necessary per protocol    Interpretation Summary    Left ventricular systolic function is normal. Left ventricular ejection fraction appears to be 66 - 70%.    Left ventricular diastolic function was normal.    Left atrial volume is mildly increased.    Estimated right ventricular systolic pressure from tricuspid regurgitation is moderately elevated (45-55 mmHg).    Mild pulmonary hypertension is present.      No radiology results for the last 30 days.  CXR:     CT:     Labs: BMP, CBC, LIPID, TSH  Lab Results   Component Value Date    GLUCOSE 110 (H) 02/15/2023    CALCIUM 9.2 02/15/2023     02/15/2023    K 4.4 02/15/2023    CO2 24.9 02/15/2023    CL 99 02/15/2023    BUN 21 02/15/2023    CREATININE 1.04 (H) 02/15/2023    EGFRIFNONA 57 (L) 01/31/2022    BCR 20.2 02/15/2023    ANIONGAP 12.1 02/15/2023     Lab Results   Component Value Date    WBC 6.36 02/15/2023    HGB 12.5 02/15/2023    HCT 36.7 02/15/2023    MCV 96.6 02/15/2023     02/15/2023     Lab Results   Component Value Date    CHOL 140 02/24/2023    CHOL 118 09/16/2021    CHOL 140 09/12/2018     Lab Results   Component Value Date    TRIG 58 02/24/2023    TRIG 86 09/16/2021    TRIG 70 09/12/2018     Lab Results   Component Value Date    HDL 69 (H) 02/24/2023    HDL 67 (H) 09/16/2021    HDL 66 09/12/2018     No components found for: LDLCALC  Lab Results   Component Value Date    LDL 59 02/24/2023    LDL 34 09/16/2021    LDL 45 09/12/2018     No results found for: HDLLDLRATIO  No components found for: CHOLHDL  Lab Results   Component Value Date    TSH 2.850 11/04/2022     Lab Results   Component Value Date    PROBNP 307.8 11/04/2022     EKG:           The following portions " of the patient's history were reviewed and updated as appropriate: allergies, current medications, past family history, past medical history, past social history, past surgical history and problem list.  Old records reviewed and pertinent information is included in the above objective data.     ASSESSMENT/PLAN:     Diagnosis Plan   1. PSVT (paroxysmal supraventricular tachycardia)        2. Essential hypertension        3. Urinary frequency          1.  Paroxysmal supraventricular tachycardia.  Known PSVT on prior EKG. past Holter monitor showing 1 episode of atrial tachycardia noted (20 beats) then converted to sinus Asymptomatic.   1 triggered event, correlated with sinus tachycardia, 93.8% NSR and 6.13 % Sinus tach.     Past myocardial nuclear stress test negative. Echo showing preserved LVEF at 68%, normal diastolic function  Occasional fluttering, mostly at night.  Continue metoprolol 25 mg twice daily    2. Essential Hypertension. Normotensive today.  Ongoing fatigue and tiredness. Encouraged to take her blood pressure at least 3 times weekly and record.      Continue losartan to 25 mg daily  Continue Cardizem  mg daily  Continue Lopressor 25 mg twice daily     3.  Urinary frequency.  I told her that I can order a urinalysis today.  She tells me she is seeing PCP next week.  I encouraged her to call primary doctor today to see he will order a UA to check for UTI.  She stated that she would call him.     I spent 26 minutes caring for Lyndsay on this date of service. This time includes time spent by me in the following activities: reviewing tests, obtaining and/or reviewing a separately obtained history, performing a medically appropriate examination and/or evaluation, counseling and educating the patient/family/caregiver, ordering medications, tests, or procedures and documenting information in the medical record  Return in about 6 months (around 2/14/2024) for Recheck.      This document has been  electronically signed by ELISABETH Das on August 14, 2023 12:20 CDT   Electronically signed by ELISABETH Das, 08/14/23, 12:20 PM CDT.

## 2023-08-16 RX ORDER — ATORVASTATIN CALCIUM 40 MG/1
TABLET, FILM COATED ORAL
Qty: 90 TABLET | Refills: 0 | Status: SHIPPED | OUTPATIENT
Start: 2023-08-16

## 2023-09-05 ENCOUNTER — OFFICE VISIT (OUTPATIENT)
Dept: FAMILY MEDICINE CLINIC | Facility: CLINIC | Age: 83
End: 2023-09-05
Payer: MEDICARE

## 2023-09-05 ENCOUNTER — LAB (OUTPATIENT)
Dept: LAB | Facility: HOSPITAL | Age: 83
End: 2023-09-05
Payer: MEDICARE

## 2023-09-05 VITALS
HEIGHT: 63 IN | DIASTOLIC BLOOD PRESSURE: 76 MMHG | SYSTOLIC BLOOD PRESSURE: 136 MMHG | BODY MASS INDEX: 26.05 KG/M2 | WEIGHT: 147 LBS

## 2023-09-05 DIAGNOSIS — E78.49 OTHER HYPERLIPIDEMIA: Chronic | ICD-10-CM

## 2023-09-05 DIAGNOSIS — R29.818 NEUROGENIC CLAUDICATION: ICD-10-CM

## 2023-09-05 DIAGNOSIS — R73.09 ELEVATED HEMOGLOBIN A1C: ICD-10-CM

## 2023-09-05 DIAGNOSIS — M54.50 BILATERAL LOW BACK PAIN WITHOUT SCIATICA, UNSPECIFIED CHRONICITY: ICD-10-CM

## 2023-09-05 DIAGNOSIS — I10 ESSENTIAL HYPERTENSION: Chronic | ICD-10-CM

## 2023-09-05 DIAGNOSIS — I10 ESSENTIAL HYPERTENSION: Primary | Chronic | ICD-10-CM

## 2023-09-05 LAB
BACTERIA UR QL AUTO: ABNORMAL /HPF
BILIRUB UR QL STRIP: NEGATIVE
CLARITY UR: ABNORMAL
COLOR UR: YELLOW
EXPIRATION DATE: NORMAL
GLUCOSE UR STRIP-MCNC: NEGATIVE MG/DL
HBA1C MFR BLD: 5.7 %
HGB UR QL STRIP.AUTO: ABNORMAL
HYALINE CASTS UR QL AUTO: ABNORMAL /LPF
KETONES UR QL STRIP: ABNORMAL
LEUKOCYTE ESTERASE UR QL STRIP.AUTO: ABNORMAL
Lab: 603
NITRITE UR QL STRIP: NEGATIVE
PH UR STRIP.AUTO: 5.5 [PH] (ref 5–9)
PROT UR QL STRIP: ABNORMAL
RBC # UR STRIP: ABNORMAL /HPF
REF LAB TEST METHOD: ABNORMAL
SP GR UR STRIP: 1.02 (ref 1–1.03)
SQUAMOUS #/AREA URNS HPF: ABNORMAL /HPF
UROBILINOGEN UR QL STRIP: ABNORMAL
WBC # UR STRIP: ABNORMAL /HPF

## 2023-09-05 PROCEDURE — 3044F HG A1C LEVEL LT 7.0%: CPT | Performed by: FAMILY MEDICINE

## 2023-09-05 PROCEDURE — 81001 URINALYSIS AUTO W/SCOPE: CPT | Performed by: FAMILY MEDICINE

## 2023-09-05 PROCEDURE — 3075F SYST BP GE 130 - 139MM HG: CPT | Performed by: FAMILY MEDICINE

## 2023-09-05 PROCEDURE — 3078F DIAST BP <80 MM HG: CPT | Performed by: FAMILY MEDICINE

## 2023-09-05 PROCEDURE — 99214 OFFICE O/P EST MOD 30 MIN: CPT | Performed by: FAMILY MEDICINE

## 2023-09-05 PROCEDURE — 83036 HEMOGLOBIN GLYCOSYLATED A1C: CPT | Performed by: FAMILY MEDICINE

## 2023-09-05 RX ORDER — CEFUROXIME AXETIL 500 MG/1
TABLET ORAL
Qty: 14 TABLET | Refills: 0 | Status: SHIPPED | OUTPATIENT
Start: 2023-09-05

## 2023-09-05 NOTE — PROGRESS NOTES
Urinalysis shows borderline contamination versus low-grade infection.  Sent in medicine to try to pharmacy for a week to see if it helps with symptoms.

## 2023-09-05 NOTE — PROGRESS NOTES
Subjective   Lyndsay Brumfield is a 83 y.o. female.  Reevaluation hypertension mild hyperlipidemia history of SVT of elevated A1c.  Interim A1c is dropped to 5.7.  States not eating as much.  Has developed what sounds like neurogenic claudication leg and back pain after walking some distance.  Relieved by rest and sitting and resting.  Is concerned may have a UTI although symptoms been going on for 3 months.  We have counseled briefly on neurogenic claudication.  Medications are noted.    History of Present Illness   Hypertension  Pertinent negatives include no chest pain, headaches or palpitations.     The following portions of the patient's history were reviewed and updated as appropriate: allergies, current medications, past family history, past medical history, past social history, past surgical history, and problem list.    Review of Systems  Review of Systems   Constitutional:  Negative for activity change, appetite change, fatigue and unexpected weight change.   HENT:  Negative for trouble swallowing and voice change.    Eyes:  Negative for redness and visual disturbance.   Respiratory:  Negative for cough and wheezing.    Cardiovascular:  Negative for chest pain and palpitations.   Gastrointestinal:  Negative for abdominal pain, constipation, diarrhea, nausea and vomiting.   Genitourinary:  Negative for urgency.   Musculoskeletal:  Positive for back pain. Negative for joint swelling.   Neurological:  Negative for syncope and headaches.   Hematological:  Negative for adenopathy.   Psychiatric/Behavioral:  Negative for sleep disturbance.      Objective   Physical Exam  Physical Exam  Constitutional:       Appearance: She is well-developed.   HENT:      Head: Normocephalic.   Eyes:      Pupils: Pupils are equal, round, and reactive to light.   Neck:      Thyroid: No thyromegaly.   Cardiovascular:      Rate and Rhythm: Normal rate and regular rhythm.      Heart sounds: Normal heart sounds. No murmur  "heard.    No friction rub. No gallop.   Pulmonary:      Breath sounds: Normal breath sounds.   Abdominal:      General: There is no distension.      Palpations: Abdomen is soft. There is no mass.      Tenderness: There is no abdominal tenderness.   Musculoskeletal:         General: Normal range of motion.      Cervical back: Normal range of motion.      Comments: No peripheral edema 1+ pulses.  Get up and go 3 to 5 seconds gait normal.  No skin changes.   Skin:     General: Skin is warm and dry.   Neurological:      Mental Status: She is alert and oriented to person, place, and time.      Deep Tendon Reflexes: Reflexes are normal and symmetric.   Psychiatric:         Mood and Affect: Affect is blunt.         Speech: Speech is delayed.         Behavior: Behavior is cooperative.     Results for orders placed or performed in visit on 09/05/23   POC Glycosylated Hemoglobin (Hb A1C)    Specimen: Blood   Result Value Ref Range    Hemoglobin A1C 5.7 %    Lot Number [id number]     Expiration Date 6/2,025          Visit Vitals  /76   Ht 160 cm (63\")   Wt 66.7 kg (147 lb)   LMP  (LMP Unknown) Comment: Postmenopausal   BMI 26.04 kg/m²     Body mass index is 26.04 kg/m².    /76   Ht 160 cm (63\")   Wt 66.7 kg (147 lb)   LMP  (LMP Unknown) Comment: Postmenopausal  BMI 26.04 kg/m²     Assessment/Plan   Diagnoses and all orders for this visit:    1. Essential hypertension (Primary)  -     Comprehensive Metabolic Panel; Future  -     Magnesium; Future    2. Other hyperlipidemia  -     Lipid Panel With LDL / HDL Ratio; Future    3. Elevated hemoglobin A1c  -     POC Glycosylated Hemoglobin (Hb A1C)  -     Hemoglobin A1c; Future    4. Bilateral low back pain without sciatica, unspecified chronicity  -     Urinalysis With Microscopic - Urine, Clean Catch    5. Neurogenic claudication    Have counseled on neurogenic claudication.  Urinalysis done at request.  If negative then will treat with exercise observation.  Offered MRI " we will consider same in the future.  Continue medicines as outlined otherwise.  Recheck 6 months with all lab prior will also be time for subsequent Medicare

## (undated) DEVICE — CORE TRUMPET FOR SINGLE SOLUTION BAG: Brand: CORE DYNAMICS

## (undated) DEVICE — DECANTER BAG 9": Brand: MEDLINE INDUSTRIES, INC.

## (undated) DEVICE — ADHS LIQ MASTISOL 2/3ML

## (undated) DEVICE — SINGLE-USE BIOPSY FORCEPS: Brand: RADIAL JAW 4

## (undated) DEVICE — MONOPOLAR METZENBAUM SCISSOR TIP, DISPOSABLE: Brand: MONOPOLAR METZENBAUM SCISSOR TIP, DISPOSABLE

## (undated) DEVICE — TRAP FLD MINIVAC MEGADYNE 100ML

## (undated) DEVICE — ENDOPATH XCEL BLADELESS TROCARS WITH STABILITY SLEEVES: Brand: ENDOPATH XCEL

## (undated) DEVICE — TROCAR SITE CLOSURE DEVICE: Brand: ENDO CLOSE

## (undated) DEVICE — STERILE POLYISOPRENE POWDER-FREE SURGICAL GLOVES WITH EMOLLIENT COATING: Brand: PROTEXIS

## (undated) DEVICE — SYR LL TP 10ML STRL

## (undated) DEVICE — STRIP,CLOSURE,WOUND,MEDI-STRIP,1/2X4: Brand: MEDLINE

## (undated) DEVICE — BITEBLOCK ENDO W/STRAP 60F A/ LF DISP

## (undated) DEVICE — PK LAP CHOLE LF 60

## (undated) DEVICE — SOL IRRIG NACL 1000ML

## (undated) DEVICE — GLV SURG TRIUMPH PF LTX 6.5 STRL

## (undated) DEVICE — ANTIBACTERIAL UNDYED BRAIDED (POLYGLACTIN 910), SYNTHETIC ABSORBABLE SUTURE: Brand: COATED VICRYL

## (undated) DEVICE — PATIENT RETURN ELECTRODE, SINGLE-USE, CONTACT QUALITY MONITORING, ADULT, WITH 9FT CORD, FOR PATIENTS WEIGING OVER 33LBS. (15KG): Brand: MEGADYNE

## (undated) DEVICE — TBG PENCL TELESCP MEGADYNE SMOKE EVAC 10FT

## (undated) DEVICE — MSK ENDO PORT O2 POM ELITE CURAPLEX A/

## (undated) DEVICE — SYR LUERLOK 50ML

## (undated) DEVICE — GLV SURG SIGNATURE ESSENTIAL PF LTX SZ7

## (undated) DEVICE — LAPAROVUE VISIBILITY SYSTEM LAPAROSCOPIC SOLUTIONS: Brand: LAPAROVUE

## (undated) DEVICE — CVR SURG EQUIP BND RECTG 36X28

## (undated) DEVICE — CLEANGUIDE DISPOSABLE MARKED SPRING TIP GUIDEWIRE, 1.86 MM X 210 CM: Brand: CLEANGUIDE

## (undated) DEVICE — ENDOPOUCH RETRIEVER SPECIMEN RETRIEVAL BAGS: Brand: ENDOPOUCH RETRIEVER

## (undated) DEVICE — SYS CLS SKIN PREMIERPRO EXOFINFUSION 22CM

## (undated) DEVICE — PDS II VLT 0 107CM AG ST3: Brand: ENDOLOOP

## (undated) DEVICE — GLV SURG SIGNATURE ESSENTIAL PF LTX SZ6.5

## (undated) DEVICE — GLV SURG SENSICARE PI ORTHO PF SZ7 LF STRL

## (undated) DEVICE — CLTH CLENS READYCLEANSE PERI CARE PK/5

## (undated) DEVICE — GOWN,AURORA,NOREINF,RAGLAN,XL,STERILE: Brand: MEDLINE

## (undated) DEVICE — THE KUMAR CATHETER®, USED IN CONJUNCTION WITH KUMAR CHOLANGIOGRAPHY® CLAMP, IS MEANT TO PROVIDE A MEANS OF LAPAROSCOPIC CHOLANGIOGRAPHY. IT COMPRISES A TRANSLUCENT TUBING ( 76 CM. LENGTH AND 16 GA. ) THAT CARRIES A 19 GA., 1.25 CM LONG NEEDLE AT THE END. THE KUMAR CATHETER® IS USED TO PUNCTURE THE HARTMANN'S POUCH OF THE GALLBLADDER FOR BILIARY ACCESS AND / OR ASPIRATION. PRODUCT IS LATEX FREE.: Brand: KUMAR CATHETER®

## (undated) DEVICE — SYR LUERLOK 20CC BX/50